# Patient Record
Sex: MALE | NOT HISPANIC OR LATINO | Employment: OTHER | URBAN - NONMETROPOLITAN AREA
[De-identification: names, ages, dates, MRNs, and addresses within clinical notes are randomized per-mention and may not be internally consistent; named-entity substitution may affect disease eponyms.]

---

## 2020-01-01 ENCOUNTER — LAB REQUISITION (OUTPATIENT)
Dept: LAB | Facility: HOSPITAL | Age: 70
End: 2020-01-01

## 2020-01-01 DIAGNOSIS — Z00.00 ROUTINE GENERAL MEDICAL EXAMINATION AT A HEALTH CARE FACILITY: ICD-10-CM

## 2020-01-01 LAB — D DIMER PPP FEU-MCNC: 1.9 MG/L (FEU) (ref 0–0.5)

## 2020-01-01 PROCEDURE — 85379 FIBRIN DEGRADATION QUANT: CPT

## 2020-07-02 ENCOUNTER — HOSPITAL ENCOUNTER (INPATIENT)
Age: 70
LOS: 5 days | Discharge: NURSING, REHAB OR LONG-TERM CARE | DRG: 056 | End: 2020-07-07
Attending: EMERGENCY MEDICINE | Admitting: HOSPITALIST
Payer: MEDICARE

## 2020-07-02 ENCOUNTER — APPOINTMENT (OUTPATIENT)
Dept: CT IMAGING | Age: 70
DRG: 056 | End: 2020-07-02
Payer: MEDICARE

## 2020-07-02 PROBLEM — G93.41 METABOLIC ENCEPHALOPATHY: Status: ACTIVE | Noted: 2020-07-02

## 2020-07-02 LAB
ALBUMIN SERPL-MCNC: 3.9 G/DL (ref 3.5–5.2)
ALP BLD-CCNC: 65 U/L (ref 40–130)
ALT SERPL-CCNC: 15 U/L (ref 5–41)
ANION GAP SERPL CALCULATED.3IONS-SCNC: 10 MMOL/L (ref 7–19)
AST SERPL-CCNC: 23 U/L (ref 5–40)
BASOPHILS ABSOLUTE: 0 K/UL (ref 0–0.2)
BASOPHILS RELATIVE PERCENT: 0.5 % (ref 0–1)
BILIRUB SERPL-MCNC: 0.8 MG/DL (ref 0.2–1.2)
BILIRUBIN URINE: NEGATIVE
BLOOD, URINE: NEGATIVE
BUN BLDV-MCNC: 16 MG/DL (ref 8–23)
CALCIUM SERPL-MCNC: 9.3 MG/DL (ref 8.8–10.2)
CHLORIDE BLD-SCNC: 104 MMOL/L (ref 98–111)
CLARITY: CLEAR
CO2: 26 MMOL/L (ref 22–29)
COLOR: YELLOW
CREAT SERPL-MCNC: 0.7 MG/DL (ref 0.5–1.2)
EOSINOPHILS ABSOLUTE: 0.2 K/UL (ref 0–0.6)
EOSINOPHILS RELATIVE PERCENT: 3 % (ref 0–5)
GFR NON-AFRICAN AMERICAN: >60
GLUCOSE BLD-MCNC: 96 MG/DL (ref 74–109)
GLUCOSE URINE: NEGATIVE MG/DL
HCT VFR BLD CALC: 38.8 % (ref 42–52)
HEMOGLOBIN: 12.7 G/DL (ref 14–18)
IMMATURE GRANULOCYTES #: 0 K/UL
KETONES, URINE: NEGATIVE MG/DL
LACTIC ACID: 0.6 MMOL/L (ref 0.5–1.9)
LEUKOCYTE ESTERASE, URINE: NEGATIVE
LYMPHOCYTES ABSOLUTE: 1.6 K/UL (ref 1.1–4.5)
LYMPHOCYTES RELATIVE PERCENT: 24.1 % (ref 20–40)
MCH RBC QN AUTO: 29.7 PG (ref 27–31)
MCHC RBC AUTO-ENTMCNC: 32.7 G/DL (ref 33–37)
MCV RBC AUTO: 90.7 FL (ref 80–94)
MONOCYTES ABSOLUTE: 0.5 K/UL (ref 0–0.9)
MONOCYTES RELATIVE PERCENT: 7.2 % (ref 0–10)
NEUTROPHILS ABSOLUTE: 4.3 K/UL (ref 1.5–7.5)
NEUTROPHILS RELATIVE PERCENT: 64.9 % (ref 50–65)
NITRITE, URINE: NEGATIVE
PDW BLD-RTO: 13 % (ref 11.5–14.5)
PH UA: 6.5 (ref 5–8)
PLATELET # BLD: 158 K/UL (ref 130–400)
PMV BLD AUTO: 9.8 FL (ref 9.4–12.4)
POTASSIUM SERPL-SCNC: 3.9 MMOL/L (ref 3.5–5)
PROTEIN UA: NEGATIVE MG/DL
RBC # BLD: 4.28 M/UL (ref 4.7–6.1)
SODIUM BLD-SCNC: 140 MMOL/L (ref 136–145)
SPECIFIC GRAVITY UA: 1.01 (ref 1–1.03)
TOTAL PROTEIN: 6.2 G/DL (ref 6.6–8.7)
UROBILINOGEN, URINE: 1 E.U./DL
WBC # BLD: 6.6 K/UL (ref 4.8–10.8)

## 2020-07-02 PROCEDURE — 36415 COLL VENOUS BLD VENIPUNCTURE: CPT

## 2020-07-02 PROCEDURE — 70450 CT HEAD/BRAIN W/O DYE: CPT

## 2020-07-02 PROCEDURE — 85025 COMPLETE CBC W/AUTO DIFF WBC: CPT

## 2020-07-02 PROCEDURE — 99285 EMERGENCY DEPT VISIT HI MDM: CPT

## 2020-07-02 PROCEDURE — 83605 ASSAY OF LACTIC ACID: CPT

## 2020-07-02 PROCEDURE — 2580000003 HC RX 258: Performed by: HOSPITALIST

## 2020-07-02 PROCEDURE — 6360000002 HC RX W HCPCS: Performed by: HOSPITALIST

## 2020-07-02 PROCEDURE — 80053 COMPREHEN METABOLIC PANEL: CPT

## 2020-07-02 PROCEDURE — 1210000000 HC MED SURG R&B

## 2020-07-02 PROCEDURE — 81003 URINALYSIS AUTO W/O SCOPE: CPT

## 2020-07-02 RX ORDER — LANOLIN ALCOHOL/MO/W.PET/CERES
3 CREAM (GRAM) TOPICAL DAILY PRN
Status: ON HOLD | COMMUNITY
End: 2020-07-06 | Stop reason: HOSPADM

## 2020-07-02 RX ORDER — QUETIAPINE FUMARATE 100 MG/1
100 TABLET, FILM COATED ORAL 3 TIMES DAILY
COMMUNITY

## 2020-07-02 RX ORDER — LAMOTRIGINE 100 MG/1
100 TABLET ORAL 2 TIMES DAILY
COMMUNITY

## 2020-07-02 RX ORDER — MEDROXYPROGESTERONE ACETATE 10 MG/1
10 TABLET ORAL 2 TIMES DAILY
COMMUNITY

## 2020-07-02 RX ORDER — SODIUM CHLORIDE 0.9 % (FLUSH) 0.9 %
10 SYRINGE (ML) INJECTION PRN
Status: DISCONTINUED | OUTPATIENT
Start: 2020-07-02 | End: 2020-07-07 | Stop reason: HOSPADM

## 2020-07-02 RX ORDER — CITALOPRAM 20 MG/1
20 TABLET ORAL NIGHTLY
COMMUNITY

## 2020-07-02 RX ORDER — PROMETHAZINE HYDROCHLORIDE 25 MG/1
12.5 TABLET ORAL EVERY 6 HOURS PRN
Status: DISCONTINUED | OUTPATIENT
Start: 2020-07-02 | End: 2020-07-07 | Stop reason: HOSPADM

## 2020-07-02 RX ORDER — ACETAMINOPHEN 650 MG/1
650 SUPPOSITORY RECTAL EVERY 6 HOURS PRN
Status: DISCONTINUED | OUTPATIENT
Start: 2020-07-02 | End: 2020-07-07 | Stop reason: HOSPADM

## 2020-07-02 RX ORDER — DONEPEZIL HYDROCHLORIDE 10 MG/1
10 TABLET, FILM COATED ORAL 2 TIMES DAILY
COMMUNITY

## 2020-07-02 RX ORDER — FLUCONAZOLE 150 MG/1
150 TABLET ORAL WEEKLY
COMMUNITY

## 2020-07-02 RX ORDER — ACETAMINOPHEN 325 MG/1
650 TABLET ORAL EVERY 6 HOURS PRN
Status: DISCONTINUED | OUTPATIENT
Start: 2020-07-02 | End: 2020-07-07 | Stop reason: HOSPADM

## 2020-07-02 RX ORDER — ONDANSETRON 2 MG/ML
4 INJECTION INTRAMUSCULAR; INTRAVENOUS EVERY 6 HOURS PRN
Status: DISCONTINUED | OUTPATIENT
Start: 2020-07-02 | End: 2020-07-07 | Stop reason: HOSPADM

## 2020-07-02 RX ORDER — PRAMIPEXOLE DIHYDROCHLORIDE 1 MG/1
1 TABLET ORAL 3 TIMES DAILY
COMMUNITY

## 2020-07-02 RX ORDER — ALPRAZOLAM 0.25 MG/1
0.25 TABLET ORAL 2 TIMES DAILY PRN
Status: ON HOLD | COMMUNITY
End: 2020-07-06 | Stop reason: SDUPTHER

## 2020-07-02 RX ORDER — GABAPENTIN 300 MG/1
300 CAPSULE ORAL 2 TIMES DAILY
COMMUNITY

## 2020-07-02 RX ORDER — SODIUM CHLORIDE 0.9 % (FLUSH) 0.9 %
10 SYRINGE (ML) INJECTION EVERY 12 HOURS SCHEDULED
Status: DISCONTINUED | OUTPATIENT
Start: 2020-07-02 | End: 2020-07-07 | Stop reason: HOSPADM

## 2020-07-02 RX ORDER — SODIUM CHLORIDE 9 MG/ML
INJECTION, SOLUTION INTRAVENOUS CONTINUOUS
Status: DISCONTINUED | OUTPATIENT
Start: 2020-07-02 | End: 2020-07-06

## 2020-07-02 RX ORDER — TRAMADOL HYDROCHLORIDE 50 MG/1
50 TABLET ORAL EVERY 6 HOURS PRN
Status: ON HOLD | COMMUNITY
End: 2020-07-06 | Stop reason: SDUPTHER

## 2020-07-02 RX ORDER — HYDROXYZINE HYDROCHLORIDE 25 MG/1
25 TABLET, FILM COATED ORAL NIGHTLY
COMMUNITY

## 2020-07-02 RX ADMIN — ENOXAPARIN SODIUM 40 MG: 40 INJECTION SUBCUTANEOUS at 23:34

## 2020-07-02 RX ADMIN — SODIUM CHLORIDE: 9 INJECTION, SOLUTION INTRAVENOUS at 23:43

## 2020-07-02 RX ADMIN — SODIUM CHLORIDE, PRESERVATIVE FREE 10 ML: 5 INJECTION INTRAVENOUS at 23:34

## 2020-07-02 NOTE — ED NOTES
Bed: 04  Expected date:   Expected time:   Means of arrival:   Comments:   [address], RN  07/02/20 8838
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
## 2020-07-03 PROBLEM — F31.9 BIPOLAR DISORDER (HCC): Status: ACTIVE | Noted: 2020-07-03

## 2020-07-03 PROBLEM — F02.818 ALZHEIMER'S DEMENTIA WITH BEHAVIORAL DISTURBANCE (HCC): Status: ACTIVE | Noted: 2020-07-03

## 2020-07-03 PROBLEM — G20 PARKINSON DISEASE (HCC): Status: ACTIVE | Noted: 2020-07-03

## 2020-07-03 PROBLEM — R26.2 IMPAIRED AMBULATION: Status: ACTIVE | Noted: 2020-07-03

## 2020-07-03 PROBLEM — G30.9 ALZHEIMER'S DEMENTIA WITH BEHAVIORAL DISTURBANCE (HCC): Status: ACTIVE | Noted: 2020-07-03

## 2020-07-03 LAB
ANION GAP SERPL CALCULATED.3IONS-SCNC: 9 MMOL/L (ref 7–19)
BUN BLDV-MCNC: 14 MG/DL (ref 8–23)
CALCIUM SERPL-MCNC: 9.1 MG/DL (ref 8.8–10.2)
CHLORIDE BLD-SCNC: 105 MMOL/L (ref 98–111)
CO2: 26 MMOL/L (ref 22–29)
CREAT SERPL-MCNC: 0.7 MG/DL (ref 0.5–1.2)
GFR NON-AFRICAN AMERICAN: >60
GLUCOSE BLD-MCNC: 90 MG/DL (ref 74–109)
HCT VFR BLD CALC: 39.4 % (ref 42–52)
HEMOGLOBIN: 12.9 G/DL (ref 14–18)
MCH RBC QN AUTO: 29.9 PG (ref 27–31)
MCHC RBC AUTO-ENTMCNC: 32.7 G/DL (ref 33–37)
MCV RBC AUTO: 91.4 FL (ref 80–94)
PDW BLD-RTO: 12.8 % (ref 11.5–14.5)
PLATELET # BLD: 159 K/UL (ref 130–400)
PMV BLD AUTO: 10.2 FL (ref 9.4–12.4)
POTASSIUM SERPL-SCNC: 4.3 MMOL/L (ref 3.5–5)
RBC # BLD: 4.31 M/UL (ref 4.7–6.1)
SODIUM BLD-SCNC: 140 MMOL/L (ref 136–145)
WBC # BLD: 5.8 K/UL (ref 4.8–10.8)

## 2020-07-03 PROCEDURE — 80048 BASIC METABOLIC PNL TOTAL CA: CPT

## 2020-07-03 PROCEDURE — 87040 BLOOD CULTURE FOR BACTERIA: CPT

## 2020-07-03 PROCEDURE — 2580000003 HC RX 258: Performed by: HOSPITALIST

## 2020-07-03 PROCEDURE — 6370000000 HC RX 637 (ALT 250 FOR IP): Performed by: PSYCHIATRY & NEUROLOGY

## 2020-07-03 PROCEDURE — 6370000000 HC RX 637 (ALT 250 FOR IP): Performed by: HOSPITALIST

## 2020-07-03 PROCEDURE — 1210000000 HC MED SURG R&B

## 2020-07-03 PROCEDURE — 99223 1ST HOSP IP/OBS HIGH 75: CPT | Performed by: PSYCHIATRY & NEUROLOGY

## 2020-07-03 PROCEDURE — 85027 COMPLETE CBC AUTOMATED: CPT

## 2020-07-03 PROCEDURE — 6360000002 HC RX W HCPCS: Performed by: HOSPITALIST

## 2020-07-03 PROCEDURE — 92523 SPEECH SOUND LANG COMPREHEN: CPT

## 2020-07-03 PROCEDURE — 92610 EVALUATE SWALLOWING FUNCTION: CPT

## 2020-07-03 PROCEDURE — 87086 URINE CULTURE/COLONY COUNT: CPT

## 2020-07-03 PROCEDURE — 36415 COLL VENOUS BLD VENIPUNCTURE: CPT

## 2020-07-03 RX ORDER — QUETIAPINE FUMARATE 100 MG/1
100 TABLET, FILM COATED ORAL 3 TIMES DAILY
Status: DISCONTINUED | OUTPATIENT
Start: 2020-07-03 | End: 2020-07-07 | Stop reason: HOSPADM

## 2020-07-03 RX ORDER — PRAMIPEXOLE DIHYDROCHLORIDE 1 MG/1
1 TABLET ORAL 3 TIMES DAILY
Status: DISCONTINUED | OUTPATIENT
Start: 2020-07-03 | End: 2020-07-07 | Stop reason: HOSPADM

## 2020-07-03 RX ORDER — TRAZODONE HYDROCHLORIDE 150 MG/1
150 TABLET ORAL NIGHTLY
Status: DISCONTINUED | OUTPATIENT
Start: 2020-07-03 | End: 2020-07-07 | Stop reason: HOSPADM

## 2020-07-03 RX ORDER — GABAPENTIN 300 MG/1
300 CAPSULE ORAL 2 TIMES DAILY
Status: DISCONTINUED | OUTPATIENT
Start: 2020-07-03 | End: 2020-07-07 | Stop reason: HOSPADM

## 2020-07-03 RX ORDER — ACETAMINOPHEN 500 MG
1000 TABLET ORAL EVERY 8 HOURS SCHEDULED
Status: DISCONTINUED | OUTPATIENT
Start: 2020-07-03 | End: 2020-07-07 | Stop reason: HOSPADM

## 2020-07-03 RX ORDER — CITALOPRAM 20 MG/1
20 TABLET ORAL NIGHTLY
Status: DISCONTINUED | OUTPATIENT
Start: 2020-07-03 | End: 2020-07-07 | Stop reason: HOSPADM

## 2020-07-03 RX ORDER — DONEPEZIL HYDROCHLORIDE 5 MG/1
10 TABLET, FILM COATED ORAL 2 TIMES DAILY
Status: DISCONTINUED | OUTPATIENT
Start: 2020-07-03 | End: 2020-07-07 | Stop reason: HOSPADM

## 2020-07-03 RX ORDER — LANOLIN ALCOHOL/MO/W.PET/CERES
9 CREAM (GRAM) TOPICAL NIGHTLY
Status: DISCONTINUED | OUTPATIENT
Start: 2020-07-03 | End: 2020-07-07 | Stop reason: HOSPADM

## 2020-07-03 RX ORDER — LAMOTRIGINE 100 MG/1
100 TABLET ORAL 2 TIMES DAILY
Status: DISCONTINUED | OUTPATIENT
Start: 2020-07-03 | End: 2020-07-04

## 2020-07-03 RX ADMIN — SODIUM CHLORIDE, PRESERVATIVE FREE 10 ML: 5 INJECTION INTRAVENOUS at 08:24

## 2020-07-03 RX ADMIN — ENOXAPARIN SODIUM 40 MG: 40 INJECTION SUBCUTANEOUS at 21:40

## 2020-07-03 RX ADMIN — PRAMIPEXOLE DIHYDROCHLORIDE 1 MG: 1 TABLET ORAL at 21:31

## 2020-07-03 RX ADMIN — ACETAMINOPHEN 1000 MG: 325 TABLET, FILM COATED ORAL at 13:56

## 2020-07-03 RX ADMIN — GABAPENTIN 300 MG: 300 CAPSULE ORAL at 02:01

## 2020-07-03 RX ADMIN — TRAZODONE HYDROCHLORIDE 150 MG: 150 TABLET ORAL at 21:31

## 2020-07-03 RX ADMIN — PRAMIPEXOLE DIHYDROCHLORIDE 1 MG: 1 TABLET ORAL at 08:23

## 2020-07-03 RX ADMIN — GABAPENTIN 300 MG: 300 CAPSULE ORAL at 08:24

## 2020-07-03 RX ADMIN — DONEPEZIL HYDROCHLORIDE 10 MG: 5 TABLET, FILM COATED ORAL at 08:24

## 2020-07-03 RX ADMIN — GABAPENTIN 300 MG: 300 CAPSULE ORAL at 21:31

## 2020-07-03 RX ADMIN — PRAMIPEXOLE DIHYDROCHLORIDE 1 MG: 1 TABLET ORAL at 13:56

## 2020-07-03 RX ADMIN — QUETIAPINE FUMARATE 100 MG: 100 TABLET ORAL at 21:32

## 2020-07-03 RX ADMIN — QUETIAPINE FUMARATE 100 MG: 100 TABLET ORAL at 13:57

## 2020-07-03 RX ADMIN — LAMOTRIGINE 100 MG: 100 TABLET ORAL at 21:32

## 2020-07-03 RX ADMIN — ACETAMINOPHEN 1000 MG: 325 TABLET, FILM COATED ORAL at 21:41

## 2020-07-03 RX ADMIN — LAMOTRIGINE 100 MG: 100 TABLET ORAL at 08:24

## 2020-07-03 RX ADMIN — CITALOPRAM HYDROBROMIDE 20 MG: 20 TABLET ORAL at 21:32

## 2020-07-03 RX ADMIN — DONEPEZIL HYDROCHLORIDE 10 MG: 5 TABLET, FILM COATED ORAL at 21:41

## 2020-07-03 RX ADMIN — PROMETHAZINE HYDROCHLORIDE 12.5 MG: 25 TABLET ORAL at 08:23

## 2020-07-03 RX ADMIN — LAMOTRIGINE 100 MG: 100 TABLET ORAL at 02:01

## 2020-07-03 RX ADMIN — DONEPEZIL HYDROCHLORIDE 10 MG: 5 TABLET, FILM COATED ORAL at 02:01

## 2020-07-03 RX ADMIN — MELATONIN 9 MG: at 21:41

## 2020-07-03 RX ADMIN — QUETIAPINE FUMARATE 100 MG: 100 TABLET ORAL at 08:24

## 2020-07-03 ASSESSMENT — PAIN SCALES - GENERAL
PAINLEVEL_OUTOF10: 6
PAINLEVEL_OUTOF10: 0
PAINLEVEL_OUTOF10: 3
PAINLEVEL_OUTOF10: 4

## 2020-07-03 NOTE — PROGRESS NOTES
Speech Language Pathology  Facility/Department: [facility] 3 APARNA/VAS/MED  Initial Speech/Language/Cognitive Assessment  Bedside Swallow Evaluation   NAME: Ramirez Hastings  : 1950   MRN: [id number]  ADMISSION DATE: 2020  ADMITTING DIAGNOSIS: has Metabolic encephalopathy on their problem list.    Date of Eval: 7/3/2020   Evaluating Therapist: JAMSHID Babcock    Assessment:  Completed assessment. SLP ranked functional intelligibility of speech for unfamiliar listeners at 100% in utterances. Patient exhibited decreased select and sustained attention, slow processing, decreased auditory comprehension of even simple questions and commands, impaired verbal expressions with utterances frequently not pertaining to topics and questions at hand, decreased orientation, impaired immediate/short-term memory, and decreased reasoning/problem solving. Unaware of baseline cognitive-linguistic functioning.     Also evaluated patient's swallowing function. Patient exhibits decreased oral prep of more solid consistencies as well as sluggish, inconsistently mildly decreased laryngeal elevation for swallow airway protection. Even so, no outward S/S penetration/aspiration was noted with any regular solid consistency trial or thin H2O trial presented during assessment this date.     At this time, would trial bite sized consistency. Continue thin liquids. Recommend meds in pudding/applesauce. Will continue to follow.  Thank you for this consult.     Treatment/Goals  Timeframe for Short-term Goals: 1x/day for 3 days   Goal 1: Patient will tolerate bite sized consistency and thin liquids with min S/S penetration/aspiration during PO intake.   Goal 2: Patient staff will follow swallow safety recommendations to decrease risk of penetration/aspiration during PO intake.   Goal 3: Patient will receive daily oral care, via staff, to decrease bacteria from the oral cavity.   Goal 4: Re-assessment of swallow function for potential diet upgrade. Goal 5: Monitor speech and cognitive-linguistic functioning.     Subjective:  Chart Reviewed: Yes  Patient assessed for rehabilitation services?: Yes  Family / Caregiver Present: No     Objective: Auditory Comprehension  Comprehension:  (Delays were noted and break down was observed during even simple yes/no questions regarding immediate environment and current state of being. While delays were noted, patient demonstrated ability to follow simple 1 step commands at independent level. Delays were observed and break down was noted during simple 2 step commands.)     Expression  Primary Mode of Expression:  (Confrontation naming of items, structured responsive speech, and responses in natural conversation were all considered to be delayed and impaired with responses frequently not pertaining to topics and questions at hand.)     Motor Speech:  (SLP ranked functional intelligibility of speech for unfamiliar listeners at 100% in conversation.)     Overall Orientation Status:  (Patient demonstrated ability to verbalize name and birthday at independent level. Patient did not verbalize age, address, phone number, city, state, hospital, month, JUNO, date, or year independently.)     Attention:  (Patient demonstrated decreased select and sustained attention during assessment.)     Memory:  (Patient demonstrated decreased immediate memory with sequences of unrelated numbers/words set up to 3 items without repetitions provided. Patient demonstrated decreased short-term/working memory with less than 2 minute delay+distractions present during assessment.)     Problem Solving:  (Patient did not verbalize current PCP or pharmacy at independent level. Patient did not verbalize conditions in which he takes medications nor verbalize names of medications he currently takes independently. Patient did not calculate time appropriately to follow example medication schedules at independent level.  Patient did not verbalize appropriate simple solutions to situations that could occur during activities of daily living independently.)     Additional Assessments  Assessed patient's swallowing function. Patient exhibited decreased rotary jaw movement during oral prep of regular solid consistency trials presented by SLP. Oral transit of regular solid consistency primarily measured 1-2 seconds in length and min oral cavity residue was noted post swallows; residue cleared from the mouth with additional dry swallows. Oral transit of thin H2O trials, presented independently via cup, primarily measured 1-2 seconds in length. Laryngeal elevation during swallow initiation was considered to be sluggish and inconsistently mildly decreased for swallow airway protection. Even so, no outward S/S penetration/aspiration was observed with any regular solid consistency trial or thin H2O trial presented during evaluation this date.     At this time, would trial bite sized consistency. Continue thin liquids. Recommend meds in pudding/applesauce. Will continue to follow.      Electronically signed by JAMSHID Gramajo on 7/3/2020 at 3:00 PM

## 2020-07-03 NOTE — PROGRESS NOTES
[place], [place], [address]        DEPARTMENT OF HOSPITALIST MEDICINE        PROGRESS  NOTE:    NOTE: Portions of this note have been copied forward, however, changed to reflect the most current clinical status of this patient. Patient:  Samantha Blakely  YOB: 1950  Date of Service: 7/3/2020  MRN: [id number]   Acct: [de-identified]   Primary Care Physician: Lilian Britt  Advance Directive: Full Code  Admit Date: 7/2/2020       Hospital Day: 1      CHIEF COMPLAINT:  Chief Complaint   Patient presents with    Altered Mental Status     arrived via EMS from [facility] with reports of change in mental status noted this am       SUBJECTIVE / [address]:    7/3/2020  I saw and evaluated patient at the bedside today. He was having tremors of his hands likely secondary to Parkinson's disease. Neurology evaluated the patient and ruled out normal pressure hydrocephalus. They are giving him combination of medications to help him sleep tonight. I am going to order psychiatric evaluation as well PT/OT and case management consult for DC planning. 7/2/2020  HISTORY OF PRESENT ILLNESS:   This 71 y.o. male who came in through the ED on 7/2 on transfer from a regional nursing home because of agitation and confusion. Since being here he is mildly confused but primarily agitated. Has a history of Parkinson's disease with secondary dementia and prominent resting tremor. He is typically followed at the [facility] clinic in [place]. Currently takes a combination of Seroquel, gabapentin, Aricept and Mirapex. Apparently has been on Nuedexta , namenda and neuplazid in the past, according to the records. Admission blood work and urinalysis unremarkable. CT scan of the head films are reviewed. There is deep and cortical atrophy although the radiologist read it as ventricular enlargement somewhat out of proportion to the degree of atrophy.   Patient's mental status changes PRN    Or  ondansetron, 4 mg, Q6H PRN        Infusions:   sodium chloride 100 mL/hr at 07/02/20 2343       Laboratory Data:  Recent Labs     07/02/20  1800 07/03/20  0139   WBC 6.6 5.8   HGB 12.7* 12.9*    159     Recent Labs     07/02/20  1800 07/03/20  0139    140   K 3.9 4.3    105   CO2 26 26   BUN 16 14   CREATININE 0.7 0.7   GLUCOSE 96 90     Recent Labs     07/02/20  1800   AST 23   ALT 15   BILITOT 0.8   ALKPHOS 65     Troponin T: No results for input(s): TROPONINI in the last 72 hours. Pro-BNP: No results for input(s): BNP in the last 72 hours. INR: No results for input(s): INR in the last 72 hours. UA:  Recent Labs     07/02/20  1700   COLORU YELLOW   PHUR 6.5   CLARITYU Clear   SPECGRAV 1.014   LEUKOCYTESUR Negative   UROBILINOGEN 1.0   BILIRUBINUR Negative   BLOODU Negative   GLUCOSEU Negative     A1C: No results for input(s): LABA1C in the last 72 hours. ABG:No results for input(s): PHART, OGV4UTO, PO2ART, AOS0EDR, BEART, HGBAE, K3EDNPLV, CARBOXHGBART in the last 72 hours. Imaging:    Ct Head Wo Contrast    Result Date: 7/2/2020  1. Ventriculomegaly out of proportion to the degree of atrophy raising the differential of normal pressure hydrocephalus. The brain is otherwise unremarkable except for a empty sella. 2. Mucous retention cyst or polyp within the right maxillary sinus. Signed by Dr Jaycee La on 7/2/2020 7:29 PM       ASSESSMENT & PLAN:    Active Problems:    Metabolic encephalopathy    Parkinson disease ([address]    Alzheimer's dementia with behavioral disturbance ([address]    Impaired ambulation    Bipolar disorder ([address]  Resolved Problems:    * No resolved hospital problems.  *    Continue with current medications  Continue with one-to-one sitter  Neurology consult appreciated  Patient will be given a combination of melatonin and trazodone tonight at 6 PM to help him catch up on his sleep   Psychiatry consult given for evaluation and further treatment recommendations  PT/OT evaluation ordered  Case management consult given for DC planning  Continue with IV hydration  Patient started on soft diet      Repeat labs in a.m. Electrolyte replacement as per protocol. Patient will be monitored very closely on the floor. Further recommendations as per the hospital course. Discharge Plan: Next couple of days once patient is clinically and symptomatically stable and cleared by specialties on the case      Patient  is on DVT prophylaxis  Current medications reviewed  Lab work reviewed  Radiology/Chest x-ray films reviewed  Treatment recommendations from suspecialities reviewed, appreciated and agreed with  Discussed with the nurse and addressed all questions/concerns      Florentino Pineda MD  7/3/2020 3:28 PM      DISCLAIMER: This note was created with electronic voice recognition which does have occasional errors. If you have any questions regarding the content within the note please do not hesitate to contact me. .. Thanks.

## 2020-07-03 NOTE — CONSULTS
[facility] Neurology Consult  ? ? Patient: Ricardo Soto  MR#: [id number]  Account Number: [de-identified]   Room: 0311/311-01   YOB: 1950  Date of Progress Note: 7/3/2020  Time of Note 11:17 AM  Attending Physician: Carlotta Rolle MD  Consulting Physician: Pedro Taylor M.D.  ?  ? CHIEF COMPLAINT: Agitation, confusion, abnormal CT of the head  ? HISTORY OF PRESENT ILLNESS:   This 71 y.o. male who came in through the ED on 7/2 on transfer from a regional nursing home because of agitation and confusion. Since being here he is mildly confused but primarily agitated. Has a history of Parkinson's disease with secondary dementia and prominent resting tremor. He is typically followed at the [facility] clinic in [place]. Currently takes a combination of Seroquel, gabapentin, Aricept and Mirapex. Apparently has been on Nuedexta , namenda and neuplazid in the past, according to the records. Admission blood work and urinalysis unremarkable. CT scan of the head films are reviewed. There is deep and cortical atrophy although the radiologist read it as ventricular enlargement somewhat out of proportion to the degree of atrophy. Patient's mental status changes were relatively sudden. Also, noteworthy is the fact that he had no sleep last night and or the night before as best we can tell. REVIEW OF SYSTEMS:  Constitutional - No fever or chills. No diaphoresis or significant fatigue. HENT - No tinnitus or significant hearing loss. Eyes - no sudden vision change or eye pain  Respiratory - no significant shortness of breath or cough  Cardiovascular - no chest pain No palpitations or significant leg swelling  Gastrointestinal - no abdominal swelling or pain. Genitourinary - No difficulty urinating, dysuria  Musculoskeletal - no back pain or myalgia. Skin - no color change or rash  Neurologic - No seizures. No lateralizing weakness. Hematologic - no easy bruising or excessive bleeding.   Psychiatric - no severe anxiety or nervousness. All other review of systems are negative. ? Past Medical History:      Diagnosis Date    Anxiety     [facility]     [facility]     Parkinson disease ([facility]      Past Surgical History:  History reviewed. No pertinent surgical history. Medications in Hospital:     Current Facility-Administered Medications:     citalopram (CELEXA) tablet 20 mg, 20 mg, Oral, Nightly, Shobha Hernandez MD    donepezil (ARICEPT) tablet 10 mg, 10 mg, Oral, BID, Shobha Hernandez MD, 10 mg at 07/03/20 1344    gabapentin (NEURONTIN) capsule 300 mg, 300 mg, Oral, BID, Shobha Hernandez MD, 300 mg at 07/03/20 7513    lamoTRIgine (LAMICTAL) tablet 100 mg, 100 mg, Oral, BID, Shobha Hernandez MD, 100 mg at 07/03/20 1092    QUEtiapine (SEROQUEL) tablet 100 mg, 100 mg, Oral, TID, Shobha Hernandez MD, 100 mg at 07/03/20 3691    pramipexole (MIRAPEX) tablet 1 mg, 1 mg, Oral, TID, Shobha Hernandez MD, 1 mg at 07/03/20 2210    sodium chloride flush 0.9 % injection 10 mL, 10 mL, Intravenous, 2 times per day, Shobha Hernandez MD, 10 mL at 07/03/20 7391    sodium chloride flush 0.9 % injection 10 mL, 10 mL, Intravenous, PRN, Shobha Hernandez MD    acetaminophen (TYLENOL) tablet 650 mg, 650 mg, Oral, Q6H PRN **OR** acetaminophen (TYLENOL) suppository 650 mg, 650 mg, Rectal, Q6H PRN, Shobha Hernandez MD    magnesium hydroxide (MILK OF MAGNESIA) 400 MG/5ML suspension 30 mL, 30 mL, Oral, Daily PRN, Shobha Hernandez MD    promethazine (PHENERGAN) tablet 12.5 mg, 12.5 mg, Oral, Q6H PRN, 12.5 mg at 07/03/20 0823 **OR** ondansetron (ZOFRAN) injection 4 mg, 4 mg, Intravenous, Q6H PRN, Shobha Hernandez MD    enoxaparin (LOVENOX) injection 40 mg, 40 mg, Subcutaneous, Q24H, Shobha Hernandez MD, 40 mg at 07/02/20 2334    0.9 % sodium chloride infusion, , Intravenous, Continuous, Shobha Hernandez MD, Last Rate: 100 mL/hr at 07/02/20 2346  Allergies: Patient has no known allergies.   Social History:   TOBACCO:  has an unknown smoking status. He has never used smokeless tobacco.  ETOH:  reports previous alcohol use. Family History:       Family history unknown: Yes     ?  ? Physical Exam:    Vitals: /60   Pulse 70   Temp 96.9 °F (36.1 °C) (Temporal)   Resp 20   SpO2 98%   Constitutional - well developed, well nourished. Eyes - conjunctiva normal. Pupils react to light  Ear, nose, throat -hearing intact to finger rub No scars, masses, or lesions over external nose or ears, no atrophy of tongue  Neck-symmetric, no masses noted, no jugular vein distension  Respiration- chest wall appears symmetric, good expansion,   normal effort without use of accessory muscles  Musculoskeletal - no significant wasting of muscles noted, no bony deformities  Extremities-no clubbing, cyanosis or edema  Skin - warm, dry, and intact. No rash, erythema, or pallor. Psychiatric - mood, affect, and behavior appear normal.   Neurological exam  Awake, alert, with rapid speech. Disoriented to place but knew that it was summertime. Has pressured speech. Cranial Nerve Exam   CN II- Visual fields grossly unremarkable  CN III, IV,VI-EOMI, No nystagmus, conjugate eye movements, no ptosis  CN V-sensation intact to LT over face  CN VII-no facial assymetry  CN VIII-Hearing intact to voice  CN IX and X- Palate elevates in midline  CN XI-good shoulder shrug  CN XII-Tongue midline with no fasciculations or fibrillations  Motor Exam  V/V throughout upper and lower extremities bilaterally, no cogwheeling, normal tone  Reflexes   Absent throughout    Tremors-prominent resting tremor noted in the upper extremities  Gait  Not tested  Coordination  Finger to nose-unremarkable  ?   ?  CBC:   Recent Labs     07/02/20  1800 07/03/20  0139   WBC 6.6 5.8   HGB 12.7* 12.9*    159     BMP:   Recent Labs     07/02/20  1800 07/03/20  0139    140   K 3.9 4.3    105   CO2 26 26   BUN 16 14   CREATININE 0.7 0.7   GLUCOSE 96 90     Hepatic:   Recent Labs 07/02/20  1800   AST 23   ALT 15   BILITOT 0.8   ALKPHOS 65     Lipids: No results for input(s): CHOL, HDL in the last 72 hours. Invalid input(s): LDLCALCU  INR: No results for input(s): INR in the last 72 hours. ?  ?  Assessment and Plan   1. Parkinson's disease with dementia. Currently having some agitation and confusion. No obvious source for encephalopathy. We will try a combination of trazodone and melatonin at 6 PM tonight to get him some sleep and see where we stand. No obvious focal signs or symptoms to suggest stroke or seizure. No obvious infection. Continue baseline medications  ? 2. Abnormal CT of the head. NPH is not felt to be very likely at all. ?3.  History of bipolar disorder.   Continue Lamictal      Karl Cho MD  Board Certified in Neurology

## 2020-07-03 NOTE — PROGRESS NOTES
Nurse Nicolás Solano from [facility] called asking about the pt's admitting diagnosis. I stated that the pt's nurse was unable to come to the phone but that I would let her know that they called and ask that she give them a call back at [phone].

## 2020-07-03 NOTE — H&P
[facility] - History & Physical      PCP: Lilian Britt    Date of Admission: 7/2/2020    Date of Service: 7/2/2020    Chief Complaint: confused    History Of Present Illness:   71 y.o. male who presents to the emergency department for evaluation of confusion. Patient recently was transferred from [address] [place] [address] to [address] [place] [address] within the last 1 month. Per NH records staff stated that this morning he was very confused and combative. Pt is normally alert and  conversational and acutely nurses noted acute changes. On exam pt is thinks he is in senior care and is combative. No family is available to provide additional history. Patient underwent a CT of the head which is concerning for possible normal pressure hydrocephalus otherwise no signs of acute infection, vitals afebrile and labs are otherwise unremarkable. Past Medical History:        Diagnosis Date    Anxiety     B[facility] disorder ([facility]     Dementia ([facility]     Parkinson disease ([facility]        Past Surgical History:    History reviewed. No pertinent surgical history. Home Medications:  Prior to Admission medications    Medication Sig Start Date End Date Taking? Authorizing Provider   citalopram (CELEXA) 20 MG tablet Take 20 mg by mouth nightly   Yes Historical Provider, MD   fluconazole (DIFLUCAN) 150 MG tablet Take 150 mg by mouth once a week   Yes Historical Provider, MD   hydrOXYzine (ATARAX) 25 MG tablet Take 25 mg by mouth nightly   Yes Historical Provider, MD   Deutetrabenazine 12 MG TABS Take 12 mg by mouth 2 times daily   Yes Historical Provider, MD   donepezil (ARICEPT) 10 MG tablet Take 10 mg by mouth 2 times daily   Yes Historical Provider, MD   gabapentin (NEURONTIN) 300 MG capsule Take 300 mg by mouth 2 times daily.    Yes Historical Provider, MD   lamoTRIgine (LAMICTAL) 100 MG tablet Take 100 mg by mouth 2 times daily   Yes Historical Provider, MD   medroxyPROGESTERone (PROVERA) 10 MG tablet Take 10 Capillary refill takes less than 2 seconds. Coloration: Skin is not pale. Findings: No rash. Neurological:      Mental Status: He is alert. He is disoriented and confused. CN2-12 grossly intact. Tremors noted in Rt upper Ext    Diagnostic Data:  Imaging:   CT Head WO Contrast   Final Result   1. Ventriculomegaly out of proportion to the degree of atrophy raising   the differential of normal pressure hydrocephalus. The brain is   otherwise unremarkable except for a empty sella. 2. Mucous retention cyst or polyp within the right maxillary sinus. Signed by Dr Phan Jimenez on 7/2/2020 7:29 PM        CBC:  Recent Labs     07/02/20  1800   WBC 6.6   HGB 12.7*   HCT 38.8*        BMP:  Recent Labs     07/02/20  1800      K 3.9      CO2 26   BUN 16   CREATININE 0.7   CALCIUM 9.3     Recent Labs     07/02/20  1800   AST 23   ALT 15   BILITOT 0.8   ALKPHOS 65     Coag Panel: No results for input(s): INR, PROTIME, APTT in the last 72 hours. Cardiac Enzymes: No results for input(s): Amaryllis Goodell in the last 72 hours. ABGs:No results found for: Aide Persons, [id number]  Urinalysis:  Lab Results   Component Value Date    NITRU Negative 07/02/2020    BLOODU Negative 07/02/2020    SPECGRAV 1.014 07/02/2020    GLUCOSEU Negative 07/02/2020       Active Hospital Problems    Diagnosis Date Noted    Metabolic encephalopathy [E71.76] 07/02/2020       Assessment and Plan: Active Problems:    Metabolic encephalopathy- unclear etiology    Possible NPH    Hx Parkinsons Dz    Parkinsonz Dementia       Plan: Will admit pt to [address]  Will consult neurology for further evaluation of normal pressure hydrocephalus  We will consult PT OT  Will order Speech Swallow consult  We will order blood cultures and urine cultures, will order ammonia levels.   Full CODE      DVT prophylaxis with Lovenox         Toy Nafisay  Hospitalist service  7/2/2020  9:01 PM

## 2020-07-03 NOTE — ED PROVIDER NOTES
[facility]  eMERGENCY dEPARTMENT eNCOUnter      Pt Name: Apryl Tay  MRN: [id number]  [place] 1950  Date of evaluation: 7/2/2020  Provider: Gretchen Ellison MD    CHIEF COMPLAINT       Chief Complaint   Patient presents with    Altered Mental Status     arrived via EMS from [facility] with reports of change in mental status noted this am         HISTORY OF PRESENT ILLNESS   (Location/Symptom, Timing/Onset,Context/Setting, Quality, Duration, Modifying Factors, Severity)  Note limiting factors. Apryl Tay is a 71 y.o. male who presents to the emergency department for evaluation regarding altered mental status. Patient just recently transferred from [address] [place] [address] to [address] [place] [address]. Staff stated that this morning he was very confused and not his usual self. Nursing staff have discussed with the nurse practitioner who has seen patient for some years at both [facility] homes and reports that he is normally alert, conversational and that today is not his baseline. On arrival to the emergency department patient is alert, he cannot tell me the year or where he is at. He is really difficult to obtain much history at all from due to his confusion. No family is available to provide additional history. In review of previous records he apparently has a prior history of bipolar disorder. HPI    NursingNotes were reviewed. REVIEW OF SYSTEMS    (2-9 systems for level 4, 10 or more for level 5)     Review of Systems   Unable to perform ROS: Mental status change            PAST MEDICALHISTORY     Past Medical History:   Diagnosis Date    Anxiety     Bipolar 1 disorder ([facility]     Dementia ([facility]     Parkinson disease ([facility]          SURGICAL HISTORY     History reviewed. No pertinent surgical history. CURRENT MEDICATIONS     Previous Medications    ALPRAZOLAM (XANAX) 0.25 MG TABLET    Take 0.25 mg by mouth 2 times daily as needed for Sleep.     CITALOPRAM (CELEXA) 20 MG TABLET    Take 20 mg by mouth nightly    DEUTETRABENAZINE 12 MG TABS    Take 12 mg by mouth 2 times daily    DONEPEZIL (ARICEPT) 10 MG TABLET    Take 10 mg by mouth 2 times daily    FLUCONAZOLE (DIFLUCAN) 150 MG TABLET    Take 150 mg by mouth once a week    GABAPENTIN (NEURONTIN) 300 MG CAPSULE    Take 300 mg by mouth 2 times daily. HYDROXYZINE (ATARAX) 25 MG TABLET    Take 25 mg by mouth nightly    LAMOTRIGINE (LAMICTAL) 100 MG TABLET    Take 100 mg by mouth 2 times daily    MEDROXYPROGESTERONE (PROVERA) 10 MG TABLET    Take 10 mg by mouth 2 times daily    MELATONIN 3 MG TABS TABLET    Take 3 mg by mouth daily as needed    PRAMIPEXOLE (MIRAPEX) 1 MG TABLET    Take 1 mg by mouth 3 times daily    QUETIAPINE (SEROQUEL) 100 MG TABLET    Take 100 mg by mouth 3 times daily    TRAMADOL (ULTRAM) 50 MG TABLET    Take 50 mg by mouth every 8 hours as needed for Pain. ALLERGIES     Patient has no known allergies. FAMILY HISTORY     History reviewed. No pertinent family history.        SOCIAL HISTORY       Social History     Socioeconomic History    Marital status: Single     Spouse name: None    Number of children: None    Years of education: None    Highest education level: None   Occupational History    None   Social Needs    Financial resource strain: None    Food insecurity     Worry: None     Inability: None    Transportation needs     Medical: None     Non-medical: None   Tobacco Use    Smoking status: Unknown If Ever Smoked    Smokeless tobacco: Never Used   Substance and Sexual Activity    Alcohol use: Not Currently    Drug use: Not Currently    Sexual activity: None   Lifestyle    Physical activity     Days per week: None     Minutes per session: None    Stress: None   Relationships    Social connections     Talks on phone: None     Gets together: None     Attends Yarsani service: None     Active member of club or organization: None     Attends meetings of clubs or organizations: None Relationship status: None    Intimate partner violence     Fear of current or ex partner: None     Emotionally abused: None     Physically abused: None     Forced sexual activity: None   Other Topics Concern    None   Social History Narrative    None       SCREENINGS    Raffi Coma Scale  Eye Opening: Spontaneous  Best Verbal Response: Confused  Best Motor Response: Obeys commands  Raffi Coma Scale Score: 14        PHYSICAL EXAM    (up to 7 for level 4, 8 or more for level 5)     ED Triage Vitals [07/02/20 1711]   BP Temp Temp src Pulse Resp SpO2 Height Weight   (!) 94/54 99.1 °F (37.3 °C) -- 75 18 96 % -- --       Physical Exam  Vitals signs and nursing note reviewed. HENT:      Head: Atraumatic. Mouth/Throat:      Mouth: Mucous membranes are moist. Mucous membranes are not dry. Pharynx: No posterior oropharyngeal erythema. Eyes:      General: No scleral icterus. Pupils: Pupils are equal, round, and reactive to light. Neck:      Trachea: No tracheal deviation. Cardiovascular:      Rate and Rhythm: Normal rate and regular rhythm. Pulses: Normal pulses. Heart sounds: Normal heart sounds. No murmur. Pulmonary:      Effort: Pulmonary effort is normal. No respiratory distress. Breath sounds: Normal breath sounds. No stridor. Abdominal:      General: There is no distension. Palpations: Abdomen is soft. Tenderness: There is no abdominal tenderness. There is no guarding. Musculoskeletal:      Right lower leg: No edema. Left lower leg: No edema. Skin:     Capillary Refill: Capillary refill takes less than 2 seconds. Coloration: Skin is not pale. Findings: No rash. Neurological:      Mental Status: He is alert. He is disoriented and confused. Comments: Seems to move upper and lower extremities equally. He does have a baseline resting tremor noted in his right upper extremity.          DIAGNOSTIC RESULTS       RADIOLOGY:  Non-plain film images such as CT, Ultrasound and MRI are read by the radiologist. Plain radiographic images are visualized and preliminarily interpreted bythe emergency physician with the below findings:      CT Head WO Contrast   Final Result   1. Ventriculomegaly out of proportion to the degree of atrophy raising   the differential of normal pressure hydrocephalus. The brain is   otherwise unremarkable except for a empty sella. 2. Mucous retention cyst or polyp within the right maxillary sinus. Signed by Dr Jaclyn Jasso on 7/2/2020 7:29 PM              LABS:  Labs Reviewed   COMPREHENSIVE METABOLIC PANEL - Abnormal; Notable for the following components:       Result Value    Total Protein 6.2 (*)     All other components within normal limits   CBC WITH AUTO DIFFERENTIAL - Abnormal; Notable for the following components:    RBC 4.28 (*)     Hemoglobin 12.7 (*)     Hematocrit 38.8 (*)     MCHC 32.7 (*)     All other components within normal limits   URINALYSIS   LACTIC ACID, PLASMA       All other labs were within normal range or not returned as of this dictation. EMERGENCY DEPARTMENT COURSE and DIFFERENTIAL DIAGNOSIS/MDM:   Vitals:    Vitals:    07/02/20 1711   BP: (!) 94/54   Pulse: 75   Resp: 18   Temp: 99.1 °F (37.3 °C)   SpO2: 96%       MDM    Reassessment    No clear focus for infection identified. Patient's vital signs have been stable. He has been somewhat agitated here in the ED however he is easily redirectable. He is made several attempts to climb out of the bed. I do not feel we really have a clear picture of his baseline at this point. We will plan for admission, neuro checks and neurology consultation. CONSULTS:    Case was discussed with Dr. Melisa Ch regarding admission to the hospitalist service. PROCEDURES:  Unless otherwise noted below, none     Procedures    FINAL IMPRESSION      1.  Acute encephalopathy          DISPOSITION/PLAN   DISPOSITION Decision To Admit 07/02/2020 08:58:10 PM    (Please note that portions of this note were completed with a voice recognition program.  Efforts were made to edit thedictations but occasionally words are mis-transcribed.)    Leida Solitario MD (electronically signed)  Attending Emergency Physician         Leida Solitario MD  07/02/20 2534

## 2020-07-03 NOTE — PROGRESS NOTES
Tammy Tobias arrived to room # 311. Presented with: acute encephalopathy  Mental Status: Patient is oriented, alert, coherent, logical, thought processes intact and able to concentrate and follow conversation. Vitals:    07/02/20 2140   BP: 123/63   Pulse: 71   Resp: 19   Temp: 98.6 °F (37 °C)   SpO2: 98%     Patient safety contract and falls prevention contract reviewed with patient No. Pt confused. Oriented Patient to room. Call light within reach. Yes.   Needs, issues or concerns expressed at this time: no.      Electronically signed by Erika Diaz RN on 7/2/2020 at 10:03 PM

## 2020-07-03 NOTE — PROGRESS NOTES
Per Records from [address] [place] [address], pt's responsible party is [employer] (state guardianship). [address]: [phone]  Office: [phone]  Email: [email or web address]

## 2020-07-03 NOTE — PLAN OF CARE
Problem: Falls - Risk of:  Goal: Will remain free from falls  Description: Will remain free from falls  Outcome: Ongoing  Goal: Absence of physical injury  Description: Absence of physical injury  Outcome: Ongoing     Problem: Skin Integrity:  Goal: Will show no infection signs and symptoms  Description: Will show no infection signs and symptoms  Outcome: Ongoing  Goal: Absence of new skin breakdown  Description: Absence of new skin breakdown  Outcome: Ongoing     Problem: Confusion - Acute:  Goal: Absence of continued neurological deterioration signs and symptoms  Description: Absence of continued neurological deterioration signs and symptoms  Outcome: Ongoing  Goal: Mental status will be restored to baseline  Description: Mental status will be restored to baseline  Outcome: Ongoing     Problem: Discharge Planning:  Goal: Ability to perform activities of daily living will improve  Description: Ability to perform activities of daily living will improve  Outcome: Ongoing  Goal: Participates in care planning  Description: Participates in care planning  Outcome: Ongoing     Problem: Injury - Risk of, Physical Injury:  Goal: Will remain free from falls  Description: Will remain free from falls  Outcome: Ongoing  Goal: Absence of physical injury  Description: Absence of physical injury  Outcome: Ongoing     Problem: Mood - Altered:  Goal: Mood stable  Description: Mood stable  Outcome: Ongoing  Goal: Absence of abusive behavior  Description: Absence of abusive behavior  Outcome: Ongoing  Goal: Verbalizations of feeling emotionally comfortable while being cared for will increase  Description: Verbalizations of feeling emotionally comfortable while being cared for will increase  Outcome: Ongoing     Problem: Psychomotor Activity - Altered:  Goal: Absence of psychomotor disturbance signs and symptoms  Description: Absence of psychomotor disturbance signs and symptoms  Outcome: Ongoing     Problem: Sensory Perception - Impaired:  Goal: Demonstrations of improved sensory functioning will increase  Description: Demonstrations of improved sensory functioning will increase  Outcome: Ongoing  Goal: Decrease in sensory misperception frequency  Description: Decrease in sensory misperception frequency  Outcome: Ongoing  Goal: Able to refrain from responding to false sensory perceptions  Description: Able to refrain from responding to false sensory perceptions  Outcome: Ongoing  Goal: Demonstrates accurate environmental perceptions  Description: Demonstrates accurate environmental perceptions  Outcome: Ongoing  Goal: Able to distinguish between reality-based and nonreality-based thinking  Description: Able to distinguish between reality-based and nonreality-based thinking  Outcome: Ongoing  Goal: Able to interrupt nonreality-based thinking  Description: Able to interrupt nonreality-based thinking  Outcome: Ongoing     Problem: Sleep Pattern Disturbance:  Goal: Appears well-rested  Description: Appears well-rested  Outcome: Ongoing

## 2020-07-03 NOTE — PLAN OF CARE
Problem: Falls - Risk of:  Goal: Will remain free from falls  Description: Will remain free from falls  7/3/2020 1207 by Arnaldo Borden RN  Outcome: Ongoing  7/3/2020 0013 by Lucero Moise RN  Outcome: Ongoing  Goal: Absence of physical injury  Description: Absence of physical injury  7/3/2020 1207 by Arnaldo Borden RN  Outcome: Ongoing  7/3/2020 0013 by Lucero Moise RN  Outcome: Ongoing     Problem: Skin Integrity:  Goal: Will show no infection signs and symptoms  Description: Will show no infection signs and symptoms  7/3/2020 1207 by Arnaldo Borden RN  Outcome: Ongoing  7/3/2020 0013 by Lucero Moise RN  Outcome: Ongoing  Goal: Absence of new skin breakdown  Description: Absence of new skin breakdown  7/3/2020 1207 by Arnaldo Borden RN  Outcome: Ongoing  7/3/2020 0013 by Lucero Moise RN  Outcome: Ongoing     Problem: Confusion - Acute:  Goal: Absence of continued neurological deterioration signs and symptoms  Description: Absence of continued neurological deterioration signs and symptoms  7/3/2020 1207 by Arnaldo Borden RN  Outcome: Ongoing  7/3/2020 0013 by Lucero Moise RN  Outcome: Ongoing  Goal: Mental status will be restored to baseline  Description: Mental status will be restored to baseline  7/3/2020 1207 by Arnaldo Borden RN  Outcome: Ongoing  7/3/2020 0013 by Lucero Moise RN  Outcome: Ongoing     Problem: Discharge Planning:  Goal: Ability to perform activities of daily living will improve  Description: Ability to perform activities of daily living will improve  7/3/2020 1207 by Arnaldo Borden RN  Outcome: Ongoing  7/3/2020 0013 by Lucero Moise RN  Outcome: Ongoing  Goal: Participates in care planning  Description: Participates in care planning  7/3/2020 1207 by Arnaldo Borden RN  Outcome: Ongoing  7/3/2020 0013 by Lucero Moise RN  Outcome: Ongoing     Problem: Injury - Risk of, Physical Injury:  Goal: Will remain free from falls  Description: Will remain free from falls  7/3/2020 1207 by Arnaldo Borden RN  Outcome: Ongoing  7/3/2020 0013 by Gino Cerda RN  Outcome: Ongoing  Goal: Absence of physical injury  Description: Absence of physical injury  7/3/2020 1207 by Susie Chaudhary RN  Outcome: Ongoing  7/3/2020 0013 by Gino Cerda RN  Outcome: Ongoing     Problem: Mood - Altered:  Goal: Mood stable  Description: Mood stable  7/3/2020 1207 by Susie Chaudhary RN  Outcome: Ongoing  7/3/2020 0013 by Gino Cerda RN  Outcome: Ongoing  Goal: Absence of abusive behavior  Description: Absence of abusive behavior  7/3/2020 1207 by Susie Chaudhary RN  Outcome: Ongoing  7/3/2020 0013 by Gino Cerda RN  Outcome: Ongoing  Goal: Verbalizations of feeling emotionally comfortable while being cared for will increase  Description: Verbalizations of feeling emotionally comfortable while being cared for will increase  7/3/2020 1207 by Susie Chaudhary RN  Outcome: Ongoing  7/3/2020 0013 by Gino Cerda RN  Outcome: Ongoing     Problem: Psychomotor Activity - Altered:  Goal: Absence of psychomotor disturbance signs and symptoms  Description: Absence of psychomotor disturbance signs and symptoms  7/3/2020 1207 by Susie Chaudhary RN  Outcome: Ongoing  7/3/2020 0013 by Gino Cerda RN  Outcome: Ongoing     Problem: Sensory Perception - Impaired:  Goal: Demonstrations of improved sensory functioning will increase  Description: Demonstrations of improved sensory functioning will increase  7/3/2020 1207 by Susie Chaudhary RN  Outcome: Ongoing  7/3/2020 0013 by Gino Cerda RN  Outcome: Ongoing  Goal: Decrease in sensory misperception frequency  Description: Decrease in sensory misperception frequency  7/3/2020 1207 by Susie Chaudhary RN  Outcome: Ongoing  7/3/2020 0013 by Gino Cerda RN  Outcome: Ongoing  Goal: Able to refrain from responding to false sensory perceptions  Description: Able to refrain from responding to false sensory perceptions  7/3/2020 1207 by Susie Chaudhary RN  Outcome: Ongoing  7/3/2020 0013 by Gino Cerda RN  Outcome:

## 2020-07-03 NOTE — PROGRESS NOTES
Patient still confused and disoriented. Patient got dressed and attempted to leave. I went to patient and explained why he was here at the hospital and re-oriented him. Patient continues to deny coming from [address] [facility]. Patient has not slept all night, and continues to ramble on various topics. Sitter at bedside.

## 2020-07-03 NOTE — ED NOTES
Patient confused. Pt states that he needs to go to the  shop. Pt standing in the room. 2 ER staff attempting to help pt. Pt telling staff he does not need help and to go away. Assisted pt back to bed. Pt given 2 warm blankets. Sitter at bedside.       Gris Mcduffie RN  07/02/20 2011

## 2020-07-03 NOTE — PROGRESS NOTES
PHYSICAL THERAPY    Unable to see pt at this time due to an active bedrest order. Please discontinue to begin mobility assessment.     Electronically signed by Sabi Marrufo PT on 7/3/2020 at 7:07 AM
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
## 2020-07-04 LAB
ANION GAP SERPL CALCULATED.3IONS-SCNC: 10 MMOL/L (ref 7–19)
BASOPHILS ABSOLUTE: 0 K/UL (ref 0–0.2)
BASOPHILS RELATIVE PERCENT: 0.8 % (ref 0–1)
BUN BLDV-MCNC: 17 MG/DL (ref 8–23)
CALCIUM SERPL-MCNC: 8.5 MG/DL (ref 8.8–10.2)
CHLORIDE BLD-SCNC: 109 MMOL/L (ref 98–111)
CO2: 24 MMOL/L (ref 22–29)
CREAT SERPL-MCNC: 0.7 MG/DL (ref 0.5–1.2)
EOSINOPHILS ABSOLUTE: 0.1 K/UL (ref 0–0.6)
EOSINOPHILS RELATIVE PERCENT: 2.3 % (ref 0–5)
GFR NON-AFRICAN AMERICAN: >60
GLUCOSE BLD-MCNC: 99 MG/DL (ref 74–109)
HCT VFR BLD CALC: 37.6 % (ref 42–52)
HEMOGLOBIN: 12.3 G/DL (ref 14–18)
IMMATURE GRANULOCYTES #: 0 K/UL
LYMPHOCYTES ABSOLUTE: 1.8 K/UL (ref 1.1–4.5)
LYMPHOCYTES RELATIVE PERCENT: 34.7 % (ref 20–40)
MAGNESIUM: 2.1 MG/DL (ref 1.6–2.4)
MCH RBC QN AUTO: 30.1 PG (ref 27–31)
MCHC RBC AUTO-ENTMCNC: 32.7 G/DL (ref 33–37)
MCV RBC AUTO: 91.9 FL (ref 80–94)
MONOCYTES ABSOLUTE: 0.4 K/UL (ref 0–0.9)
MONOCYTES RELATIVE PERCENT: 7.9 % (ref 0–10)
NEUTROPHILS ABSOLUTE: 2.8 K/UL (ref 1.5–7.5)
NEUTROPHILS RELATIVE PERCENT: 53.9 % (ref 50–65)
PDW BLD-RTO: 13 % (ref 11.5–14.5)
PHOSPHORUS: 3.7 MG/DL (ref 2.5–4.5)
PLATELET # BLD: 152 K/UL (ref 130–400)
PMV BLD AUTO: 10.3 FL (ref 9.4–12.4)
POTASSIUM SERPL-SCNC: 4.2 MMOL/L (ref 3.5–5)
RBC # BLD: 4.09 M/UL (ref 4.7–6.1)
SODIUM BLD-SCNC: 143 MMOL/L (ref 136–145)
WBC # BLD: 5.2 K/UL (ref 4.8–10.8)

## 2020-07-04 PROCEDURE — 84100 ASSAY OF PHOSPHORUS: CPT

## 2020-07-04 PROCEDURE — 6370000000 HC RX 637 (ALT 250 FOR IP): Performed by: PSYCHIATRY & NEUROLOGY

## 2020-07-04 PROCEDURE — 83735 ASSAY OF MAGNESIUM: CPT

## 2020-07-04 PROCEDURE — 6370000000 HC RX 637 (ALT 250 FOR IP): Performed by: HOSPITALIST

## 2020-07-04 PROCEDURE — 36415 COLL VENOUS BLD VENIPUNCTURE: CPT

## 2020-07-04 PROCEDURE — 97161 PT EVAL LOW COMPLEX 20 MIN: CPT

## 2020-07-04 PROCEDURE — 1210000000 HC MED SURG R&B

## 2020-07-04 PROCEDURE — 2580000003 HC RX 258: Performed by: HOSPITALIST

## 2020-07-04 PROCEDURE — 80048 BASIC METABOLIC PNL TOTAL CA: CPT

## 2020-07-04 PROCEDURE — 85025 COMPLETE CBC W/AUTO DIFF WBC: CPT

## 2020-07-04 PROCEDURE — 6360000002 HC RX W HCPCS: Performed by: HOSPITALIST

## 2020-07-04 PROCEDURE — 99232 SBSQ HOSP IP/OBS MODERATE 35: CPT | Performed by: PSYCHIATRY & NEUROLOGY

## 2020-07-04 PROCEDURE — 99221 1ST HOSP IP/OBS SF/LOW 40: CPT | Performed by: PSYCHIATRY & NEUROLOGY

## 2020-07-04 PROCEDURE — 97116 GAIT TRAINING THERAPY: CPT

## 2020-07-04 RX ORDER — LAMOTRIGINE 100 MG/1
100 TABLET ORAL DAILY
Status: DISCONTINUED | OUTPATIENT
Start: 2020-07-05 | End: 2020-07-07 | Stop reason: HOSPADM

## 2020-07-04 RX ADMIN — ACETAMINOPHEN 1000 MG: 325 TABLET, FILM COATED ORAL at 13:31

## 2020-07-04 RX ADMIN — QUETIAPINE FUMARATE 100 MG: 100 TABLET ORAL at 21:01

## 2020-07-04 RX ADMIN — DONEPEZIL HYDROCHLORIDE 10 MG: 5 TABLET, FILM COATED ORAL at 08:09

## 2020-07-04 RX ADMIN — GABAPENTIN 300 MG: 300 CAPSULE ORAL at 08:10

## 2020-07-04 RX ADMIN — PRAMIPEXOLE DIHYDROCHLORIDE 1 MG: 1 TABLET ORAL at 08:09

## 2020-07-04 RX ADMIN — PRAMIPEXOLE DIHYDROCHLORIDE 1 MG: 1 TABLET ORAL at 13:29

## 2020-07-04 RX ADMIN — LAMOTRIGINE 150 MG: 25 TABLET ORAL at 21:00

## 2020-07-04 RX ADMIN — ACETAMINOPHEN 1000 MG: 325 TABLET, FILM COATED ORAL at 05:53

## 2020-07-04 RX ADMIN — SODIUM CHLORIDE: 9 INJECTION, SOLUTION INTRAVENOUS at 22:33

## 2020-07-04 RX ADMIN — TRAZODONE HYDROCHLORIDE 150 MG: 150 TABLET ORAL at 21:01

## 2020-07-04 RX ADMIN — GABAPENTIN 300 MG: 300 CAPSULE ORAL at 21:01

## 2020-07-04 RX ADMIN — LAMOTRIGINE 100 MG: 100 TABLET ORAL at 08:09

## 2020-07-04 RX ADMIN — ACETAMINOPHEN 1000 MG: 325 TABLET, FILM COATED ORAL at 21:00

## 2020-07-04 RX ADMIN — DONEPEZIL HYDROCHLORIDE 10 MG: 5 TABLET, FILM COATED ORAL at 21:00

## 2020-07-04 RX ADMIN — ENOXAPARIN SODIUM 40 MG: 40 INJECTION SUBCUTANEOUS at 21:02

## 2020-07-04 RX ADMIN — CITALOPRAM HYDROBROMIDE 20 MG: 20 TABLET ORAL at 21:01

## 2020-07-04 RX ADMIN — QUETIAPINE FUMARATE 100 MG: 100 TABLET ORAL at 13:29

## 2020-07-04 RX ADMIN — SODIUM CHLORIDE 100 ML/HR: 9 INJECTION, SOLUTION INTRAVENOUS at 08:10

## 2020-07-04 RX ADMIN — MELATONIN 9 MG: at 20:59

## 2020-07-04 RX ADMIN — QUETIAPINE FUMARATE 100 MG: 100 TABLET ORAL at 08:10

## 2020-07-04 RX ADMIN — PRAMIPEXOLE DIHYDROCHLORIDE 1 MG: 1 TABLET ORAL at 21:01

## 2020-07-04 ASSESSMENT — PAIN SCALES - GENERAL
PAINLEVEL_OUTOF10: 0
PAINLEVEL_OUTOF10: 0
PAINLEVEL_OUTOF10: 3
PAINLEVEL_OUTOF10: 5
PAINLEVEL_OUTOF10: 4

## 2020-07-04 NOTE — PROGRESS NOTES
melatonin  9 mg Oral Nightly    traZODone  150 mg Oral Nightly    acetaminophen  1,000 mg Oral 3 times per day    sodium chloride flush  10 mL Intravenous 2 times per day    enoxaparin  40 mg Subcutaneous Q24H        PRN:  sodium chloride flush, 10 mL, PRN  acetaminophen, 650 mg, Q6H PRN    Or  acetaminophen, 650 mg, Q6H PRN  magnesium hydroxide, 30 mL, Daily PRN  promethazine, 12.5 mg, Q6H PRN    Or  ondansetron, 4 mg, Q6H PRN        Infusions:   sodium chloride 100 mL/hr (07/04/20 0810)       Laboratory Data:  Recent Labs     07/02/20  1800 07/03/20 0139 07/04/20 0138   WBC 6.6 5.8 5.2   HGB 12.7* 12.9* 12.3*    159 152     Recent Labs     07/02/20  1800 07/03/20 0139 07/04/20 0138    140 143   K 3.9 4.3 4.2    105 109   CO2 26 26 24   BUN 16 14 17   CREATININE 0.7 0.7 0.7   GLUCOSE 96 90 99     Recent Labs     07/02/20  1800   AST 23   ALT 15   BILITOT 0.8   ALKPHOS 65     Troponin T: No results for input(s): TROPONINI in the last 72 hours. Pro-BNP: No results for input(s): BNP in the last 72 hours. INR: No results for input(s): INR in the last 72 hours. UA:  Recent Labs     07/02/20  1700   COLORU YELLOW   PHUR 6.5   CLARITYU Clear   SPECGRAV 1.014   LEUKOCYTESUR Negative   UROBILINOGEN 1.0   BILIRUBINUR Negative   BLOODU Negative   GLUCOSEU Negative     A1C: No results for input(s): LABA1C in the last 72 hours. ABG:No results for input(s): PHART, NXF4NFF, PO2ART, YOD2PVX, BEART, HGBAE, Q7NROZKV, CARBOXHGBART in the last 72 hours. Imaging:    Ct Head Wo Contrast    Result Date: 7/2/2020  1. Ventriculomegaly out of proportion to the degree of atrophy raising the differential of normal pressure hydrocephalus. The brain is otherwise unremarkable except for a empty sella. 2. Mucous retention cyst or polyp within the right maxillary sinus.  Signed by Dr Lawrence Rudd on 7/2/2020 7:29 PM       ASSESSMENT & PLAN:    Active Problems:    Metabolic encephalopathy    Parkinson disease [facility])    Alzheimer's dementia with behavioral disturbance ([facility] [address]    Impaired ambulation    Bipolar disorder ([facility] [address]  Resolved Problems:    * No resolved hospital problems. *    Continue with current medications  Continue with one-to-one sitter  Neurology consult appreciated  Patient had a good sleep last night after having a combination of melatonin and trazodone yesterday at 6 PM.  Awaiting Psychiatry consult for evaluation and further treatment recommendations  PT/OT evaluation ordered  Case management consult given for DC planning  Continue with IV hydration, cut down to 75 cc/h   Patient started on soft diet, to be advanced as tolerated  Labs reviewed in the chart which are stable      Repeat labs in a.m. Electrolyte replacement as per protocol. Patient will be monitored very closely on the floor. Further recommendations as per the hospital course. Discharge Plan: Next couple of days once patient is clinically and symptomatically stable and cleared by specialties on the case      Patient  is on DVT prophylaxis  Current medications reviewed  Lab work reviewed  Radiology/Chest x-ray films reviewed  Treatment recommendations from suspecialities reviewed, appreciated and agreed with  Discussed with the nurse and addressed all questions/concerns      Florentino Pineda MD  7/4/2020 10:51 AM      DISCLAIMER: This note was created with electronic voice recognition which does have occasional errors. If you have any questions regarding the content within the note please do not hesitate to contact me. .. Thanks.

## 2020-07-04 NOTE — PROGRESS NOTES
appears symmetric, good expansion,   normal effort without use of accessory muscles  Cardiovascular- RRR  Musculoskeletal - no significant wasting of muscles noted, no bony deformities, gait no gross ataxia  Extremities-no clubbing, cyanosis or edema  Skin - warm, dry, and intact. No rash, erythema, or pallor. Psychiatric - mood, affect, and behavior appear normal.      Neurology  NEUROLOGICAL EXAM:      Mental status   Awake, alert, fluent. Remains disoriented to place. Able to follow complex commands. Cranial Nerves   CN II- Visual fields grossly unremarkable  CN III, IV,VI-EOMI, No nystagmus, conjugate eye movements, no ptosis  CN VII-no facial asymmetry  CN VIII-Hearing intact   CN IX and X- Palate elevates in midline  CN XI-good shoulder shrug  CN XII-Tongue midline with no fasciculations or fibrillations          Motor function  Antigravity x 4     Sensory function Intact to light touch     Cerebellar F-N intact     Tremor  high-frequency resting tremor prominent in both arms     Gait                  Not tested           Nursing/pcp notes, imaging,labs and vitals reviewed. PT,OT and/or speech notes reviewed    Lab Results   Component Value Date    WBC 5.2 07/04/2020    HGB 12.3 (L) 07/04/2020    HCT 37.6 (L) 07/04/2020    MCV 91.9 07/04/2020     07/04/2020     Lab Results   Component Value Date     07/04/2020    K 4.2 07/04/2020     07/04/2020    CO2 24 07/04/2020    BUN 17 07/04/2020    CREATININE 0.7 07/04/2020    GLUCOSE 99 07/04/2020    CALCIUM 8.5 (L) 07/04/2020    PROT 6.2 (L) 07/02/2020    LABALBU 3.9 07/02/2020    BILITOT 0.8 07/02/2020    ALKPHOS 65 07/02/2020    AST 23 07/02/2020    ALT 15 07/02/2020    LABGLOM >60 07/04/2020   No results found for: INRNo results found for: PHENYTOIN, ESR, CRP          RECORD REVIEW: Previous medical records, medications were reviewed at today's visit    IMPRESSION:   1. Parkinson's disease with dementia. He is better today.  No obvious source for encephalopathy but could just be related to underlying dementia and lack of sleep. Continue trazodone/melatonin at nighttime to help improve this. No obvious focal signs or symptoms to suggest stroke or seizure. No obvious infection. Continue baseline medications  ? 2. Abnormal CT of the head. NPH is not felt to be very likely at all. ?3.  History of bipolar disorder.   Continue Lamictal

## 2020-07-04 NOTE — PROGRESS NOTES
Physical Therapy    Facility/Department: [facility] 3 APARNA/VAS/MED  Initial Assessment    NAME: Karen Tobias  : 1950  MRN: [id number]    Date of Service: 2020    Discharge Recommendations:           Assessment   Body structures, Functions, Activity limitations: Decreased functional mobility   Assessment: Patient will benefit from continuing skilled physical therapy  Treatment Diagnosis: Unsteady gait  Prognosis: Good  Decision Making: Low Complexity  REQUIRES PT FOLLOW UP: Yes  Activity Tolerance  Activity Tolerance: Patient Tolerated treatment well       Patient Diagnosis(es): The encounter diagnosis was Acute encephalopathy. has a past medical history of Anxiety, Bipolar 1 disorder ([facility] Dementia ([facility] [facility] and Parkinson disease ([facility] has no past surgical history on file.     Restrictions     Vision/Hearing        Subjective  General  Chart Reviewed: Yes  Diagnosis: Metabolic Encephalopathy  Follows Commands: Within Functional Limits  Subjective  Subjective: Agrees to work with therapy  Pain Screening  Patient Currently in Pain: Denies          Orientation  Orientation  Overall Orientation Status: Impaired  Social/Functional History     Cognition        Objective          PROM RLE (degrees)  RLE PROM: WFL  PROM LLE (degrees)  LLE PROM: WFL  Strength RLE  Comment: Grossly 4/5  Strength LLE  Comment: Grossly 4/5        Bed mobility  Supine to Sit: Modified independent  Sit to Supine: Modified independent  Scooting: Modified independent  Transfers  Sit to Stand: Contact guard assistance  Stand to sit: Contact guard assistance  Bed to Chair: Contact guard assistance  Ambulation  Ambulation?: Yes  WB Status: WBAT  Ambulation 1  Device: Hand-Held Assist  Assistance: Minimal assistance  Quality of Gait: Unsteady  Distance: 40 feet     Balance  Posture: Fair  Sitting - Static: Good  Sitting - Dynamic: Good  Standing - Static: Fair;-  Standing - Dynamic: Fair;-        Plan   Plan  Times per week: 7  Times per day: Daily  Plan weeks: 2  Current Treatment Recommendations: Strengthening, Functional Mobility Training, Gait Training, Transfer Training  Safety Devices  Type of devices: Bed alarm in place, Call light within reach, Patient at risk for falls, Left in bed, Sitter present    G-Code       OutComes Score                                                  AM-PAC Score             Goals  Short term goals  Time Frame for Short term goals: 2 weeks  Short term goal 1: Independent with transfers  Short term goal 2: Ambulate 400 feet with CGA       Therapy Time   Individual Concurrent Group Co-treatment   Time In           Time Out           Minutes                   Virginia Mcwilliams PT    Electronically signed by Virginia Mcwilliams PT on 7/4/2020 at 11:13 AM

## 2020-07-04 NOTE — PROGRESS NOTES
Physical Therapy    Daily Treatment Note    DATE:  2020  NAME:  Chaka Santiago  :  1950  (69 y.o.,male)  MRN:  [id number]      HOME LIVING:       RESTRICTIONS/PRECAUTIONS:         OVERALL  ORIENTATION STATUS:  Overall Orientation Status: Impaired    STRENGTH  Strength RLE  Comment: Grossly 4/5  Strength LLE  Comment: Grossly 4/5    ROM   PROM RLE (degrees)  RLE PROM: WFL  PROM LLE (degrees)  LLE PROM: WFL     BALANCE  Balance  Posture: Fair  Sitting - Static: Good  Sitting - Dynamic: Good  Standing - Static: Fair, -  Standing - Dynamic: Fair, -    BED MOBILITY  Bed Mobility  Scooting: Modified independent    TRANSFERS  Transfers  Sit to Stand: Contact guard assistance  Stand to sit: Contact guard assistance  Bed to Chair: Contact guard assistance    AMBULATION  Device: Hand-Held Assist  Assistance: Minimal assistance  Distance: 40 feet    STAIRS         COMMENTS:  Returned to bed at patient request  Call light within reach  Bed alarm activated  Patient instructed to request assistance before getting up  Sitter in room        Electronically signed by Navin Arndt PT on 2020 at 11:14 AM

## 2020-07-04 NOTE — PLAN OF CARE
Spoke with daughter this AM.  [facility] told her, her father was getting a  psychotherapeutic drug for Schizophrenia. Daughter states her father had never been diagnosis for schizophrenia before. I looked at patient's medications from [address] and he has been given Deutetrabenzine 12 mg BID. I did notify Dr. Rhona Phelps about this medication.

## 2020-07-04 NOTE — CONSULTS
[facility]  Psychiatry Consult    Reason for Consult: Concern   Behavioral instability, history of bipolar disorder    The primary source(s) of information include(s):  patient, documentation: Patient's chart    The patient is a 71 y.o. male with previous psychiatric history of bipolar disorder, Parkinson disease and Alzheimer's disorder, who has been admitted to medical services, secondary to episodes of behavioral instability, confusion and combative behavior. Patient's chart has been reviewed. During the initial evaluation in emergency department CAT scan of head has been performed and it was found that patient has ventriculomegaly out of proportion of the degree of atrophy raising the differential of normal pressure hydrocephalus. Patient has been evaluated by neurology and it was felt that NPH is not very likely at all. Patient has been seen in his room today. He was comfortably sitting on the bed and was wearing hospital attire. Patient looked slightly anxious, agitated and irritable. Patient stated that he has been diagnosed with bipolar disorder, Alzheimer's disorder and Parkinson disease, and has been on multiple medications for those conditions, which has been prescribed by his neurologist during the last 3 years. 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 Patient stated that dealing with people sometimes make him feel stressed, upset and frustrated, especially \"any time when they put a pressure on me\". During the interview patient denies any feeling of depression, stated that she feels mostly \"frustrated\", endorses feeling of anxiety, and agitation. He endorses good quality of sleep, denies any feeling of hopelessness or helplessness.   Patient denies current active suicidal or homicidal ideations, denies any plans. Also, he denies any auditory and visual hallucinations. He stated that he religiously preoccupied and believes that everything was happened is according to the God's will. Patient has been oriented only in his personal information, our president's name, stated that he is in [facility]. Patient experienced difficulties to say what kind of holiday is today, stated \"I know, it's going to be a party\". PSYCHIATRIC HISTORY:  Diagnoses: Bipolar disorder, Parkinson disease, Alzheimer disorder  Suicide attempts/gestures: Denies   Prior hospitalizations: Denies   Medication trials: Celexa, Atarax, donepezil, gabapentin, Lamictal, Xanax, pramipexole, Seroquel, melatonin, tramadol, deutetrabenazine  Mental health contact: Patient reported that for last 3 years his neurologist managed his psychotropic medications. Allergies:  Patient has no known allergies. EXAMINATION: CT head without contrast 7/2/2020   Dose: 1599 mGycm. Automated exposure control was utilized to diminish   patient radiation dose. Delwyn Scarce HISTORY: Confusion   FINDINGS: Multiple contiguous axial images are obtained from the skull   base to the vertex per protocol without venous contrast enhancement   with reformatted images obtained in the sagittal and coronal   projections from the original data set. There is ventriculomegaly out of proportion to the degree of atrophy. This does raise a differential of normal pressure hydrocephalus and   should be correlated clinically. There is no evidence of mass, mass effect or shift of the midline. No   evidence of acute infarct or hemorrhage. There is a empty sella. There is a 2 cm mucous retention cyst or polyp within the right   maxillary sinus. The visualized mastoid air cells and paranasal   sinuses are otherwise clear. No acute calvarial abnormalities are   present.       Impression   1.  Ventriculomegaly out of proportion to the degree of atrophy raising   the differential of normal pressure hydrocephalus. The brain is   otherwise unremarkable except for a empty sella. 2. Mucous retention cyst or polyp within the right maxillary sinus. Signed by Dr Laci Espinosa on 7/2/2020 7:29 PM       Mental Status  Appearance: Appropriately groomed and in hospital attire. Made good eye contact. Resting tremor of the right upper extremity noted. Behavior: Anxious, restless, slightly irritable, partially cooperative with interview. Mild to moderate psychomotor agitation appreciated. Speech: Slightly increased in tone, decrease in volume. Mood: \"Frustrated\"   Affect: Mood congruent. Range is intense  Thought Process: Mostly tangential  Thought Content: Patient does not have any current active suicidal and   homicidal ideations. No overt delusions or paranoia appreciated. Perceptions: Seems patient does not have any auditory or is hallucinations at present time. Patient did not appear to be responding to internal stimuli. No overt psychosis. Executive Functions: Appear impaired. Concentration: Poor  Reasoning: Appears impaired based on interaction from interview   Orientation: To person and situation. Pleasantly confused  Language: Intact. Memory: recent and remote appear impaired. Impulsivity: Questionable  Neurovegitative: Fair appetite, fair sleep  Insight: Impaired  Judgment: Impaired    Assessment  DSM 5 DIAGNOSIS:  Major neurocognitive disorder, moderate  Parkinson disease  Bipolar disorder    Recommendations  1. Currently patient is not suicidal, homicidal or psychotic. Patient does not meet criteria for psychiatric hospitalization. 2.  Patient experience episodes of agitation, which could be related to progression of Alzheimer's disorder or secondary to bipolar disorder. 3.  I would recommend to increase dose of the Lamictal from 100 mg twice a day to 100 mg in the morning and 150 mg in the evening for mood stabilization.   However, patient was informed that it would be no immediate effect of medication, and it could take up to 7-14 days until effect of medication could be evaluated. 4.  Patient has been prescribed Xanax previously, which primary treatment team put on hold. I would not recommend to restart medication, prescribed any other benzodiazepines, due to possible paradoxical effect of benzodiazepines in elderly population, especially in people with dementia. 5.  Seroquel is already prescribed at higher dose, then I would consider to prescribe to patient with dementia to manage aggression and agitation, mostly due to black box warning of antipsychotic medications, secondary to increased risk of the death, when they prescribed for patients with neurocognitive disorder. 6.  If patient would continue to have episodes of anxiety, agitation and aggression, I would recommend to discontinue donepezil, due to possible cholinergic effect of medication. 7.  Primary treatment team needs to consider patient's placement to a nursing home due to severity of the patient's dementia. 8.  Patient's nurse addressed family question about prescribed to patient deutetrabenazine Lizandro Garza). This medication is usually prescribed for patient with chorea and Harriman disease or tardive dyskinesia, secondary to side effect of 1st generation antipsychotic medications. At this point, I do not know real indications for prescribing this medication for our patient, and I would recommend to address this question to the physician, which has prescribed this medication to patient in nursing home.       Desmond Hendricks MD

## 2020-07-05 LAB
ANION GAP SERPL CALCULATED.3IONS-SCNC: 14 MMOL/L (ref 7–19)
BUN BLDV-MCNC: 18 MG/DL (ref 8–23)
CALCIUM SERPL-MCNC: 8.6 MG/DL (ref 8.8–10.2)
CHLORIDE BLD-SCNC: 109 MMOL/L (ref 98–111)
CO2: 23 MMOL/L (ref 22–29)
CREAT SERPL-MCNC: 0.7 MG/DL (ref 0.5–1.2)
GFR NON-AFRICAN AMERICAN: >60
GLUCOSE BLD-MCNC: 114 MG/DL (ref 74–109)
HCT VFR BLD CALC: 37 % (ref 42–52)
HEMOGLOBIN: 12 G/DL (ref 14–18)
MCH RBC QN AUTO: 30.3 PG (ref 27–31)
MCHC RBC AUTO-ENTMCNC: 32.4 G/DL (ref 33–37)
MCV RBC AUTO: 93.4 FL (ref 80–94)
PDW BLD-RTO: 13 % (ref 11.5–14.5)
PLATELET # BLD: 154 K/UL (ref 130–400)
PMV BLD AUTO: 10.6 FL (ref 9.4–12.4)
POTASSIUM SERPL-SCNC: 4.3 MMOL/L (ref 3.5–5)
RBC # BLD: 3.96 M/UL (ref 4.7–6.1)
SODIUM BLD-SCNC: 146 MMOL/L (ref 136–145)
URINE CULTURE, ROUTINE: NORMAL
WBC # BLD: 5.2 K/UL (ref 4.8–10.8)

## 2020-07-05 PROCEDURE — 99232 SBSQ HOSP IP/OBS MODERATE 35: CPT | Performed by: PSYCHIATRY & NEUROLOGY

## 2020-07-05 PROCEDURE — 85027 COMPLETE CBC AUTOMATED: CPT

## 2020-07-05 PROCEDURE — 6370000000 HC RX 637 (ALT 250 FOR IP): Performed by: PSYCHIATRY & NEUROLOGY

## 2020-07-05 PROCEDURE — 80048 BASIC METABOLIC PNL TOTAL CA: CPT

## 2020-07-05 PROCEDURE — 6360000002 HC RX W HCPCS: Performed by: HOSPITALIST

## 2020-07-05 PROCEDURE — 97116 GAIT TRAINING THERAPY: CPT

## 2020-07-05 PROCEDURE — 97530 THERAPEUTIC ACTIVITIES: CPT

## 2020-07-05 PROCEDURE — 2580000003 HC RX 258: Performed by: HOSPITALIST

## 2020-07-05 PROCEDURE — 1210000000 HC MED SURG R&B

## 2020-07-05 PROCEDURE — 36415 COLL VENOUS BLD VENIPUNCTURE: CPT

## 2020-07-05 PROCEDURE — 6370000000 HC RX 637 (ALT 250 FOR IP): Performed by: HOSPITALIST

## 2020-07-05 RX ADMIN — PRAMIPEXOLE DIHYDROCHLORIDE 1 MG: 1 TABLET ORAL at 08:03

## 2020-07-05 RX ADMIN — TRAZODONE HYDROCHLORIDE 150 MG: 150 TABLET ORAL at 20:26

## 2020-07-05 RX ADMIN — SODIUM CHLORIDE: 9 INJECTION, SOLUTION INTRAVENOUS at 16:00

## 2020-07-05 RX ADMIN — LAMOTRIGINE 100 MG: 100 TABLET ORAL at 08:03

## 2020-07-05 RX ADMIN — ACETAMINOPHEN 1000 MG: 325 TABLET, FILM COATED ORAL at 20:29

## 2020-07-05 RX ADMIN — PRAMIPEXOLE DIHYDROCHLORIDE 1 MG: 1 TABLET ORAL at 20:26

## 2020-07-05 RX ADMIN — DONEPEZIL HYDROCHLORIDE 10 MG: 5 TABLET, FILM COATED ORAL at 08:03

## 2020-07-05 RX ADMIN — QUETIAPINE FUMARATE 100 MG: 100 TABLET ORAL at 15:59

## 2020-07-05 RX ADMIN — GABAPENTIN 300 MG: 300 CAPSULE ORAL at 08:03

## 2020-07-05 RX ADMIN — ACETAMINOPHEN 1000 MG: 325 TABLET, FILM COATED ORAL at 15:59

## 2020-07-05 RX ADMIN — ACETAMINOPHEN 1000 MG: 325 TABLET, FILM COATED ORAL at 06:09

## 2020-07-05 RX ADMIN — CITALOPRAM HYDROBROMIDE 20 MG: 20 TABLET ORAL at 20:28

## 2020-07-05 RX ADMIN — QUETIAPINE FUMARATE 100 MG: 100 TABLET ORAL at 20:28

## 2020-07-05 RX ADMIN — ENOXAPARIN SODIUM 40 MG: 40 INJECTION SUBCUTANEOUS at 20:29

## 2020-07-05 RX ADMIN — QUETIAPINE FUMARATE 100 MG: 100 TABLET ORAL at 08:03

## 2020-07-05 RX ADMIN — MELATONIN 9 MG: at 20:26

## 2020-07-05 RX ADMIN — DONEPEZIL HYDROCHLORIDE 10 MG: 5 TABLET, FILM COATED ORAL at 20:26

## 2020-07-05 RX ADMIN — LAMOTRIGINE 150 MG: 25 TABLET ORAL at 20:28

## 2020-07-05 RX ADMIN — PRAMIPEXOLE DIHYDROCHLORIDE 1 MG: 1 TABLET ORAL at 15:58

## 2020-07-05 RX ADMIN — GABAPENTIN 300 MG: 300 CAPSULE ORAL at 20:28

## 2020-07-05 ASSESSMENT — PAIN DESCRIPTION - ORIENTATION: ORIENTATION: LOWER

## 2020-07-05 ASSESSMENT — PAIN SCALES - GENERAL
PAINLEVEL_OUTOF10: 8
PAINLEVEL_OUTOF10: 0
PAINLEVEL_OUTOF10: 0
PAINLEVEL_OUTOF10: 8
PAINLEVEL_OUTOF10: 0
PAINLEVEL_OUTOF10: 8
PAINLEVEL_OUTOF10: 0

## 2020-07-05 ASSESSMENT — PAIN DESCRIPTION - LOCATION: LOCATION: BACK

## 2020-07-05 ASSESSMENT — PAIN - FUNCTIONAL ASSESSMENT: PAIN_FUNCTIONAL_ASSESSMENT: PREVENTS OR INTERFERES SOME ACTIVE ACTIVITIES AND ADLS

## 2020-07-05 ASSESSMENT — PAIN DESCRIPTION - ONSET: ONSET: ON-GOING

## 2020-07-05 ASSESSMENT — PAIN DESCRIPTION - PROGRESSION: CLINICAL_PROGRESSION: NOT CHANGED

## 2020-07-05 ASSESSMENT — PAIN DESCRIPTION - DESCRIPTORS: DESCRIPTORS: ACHING

## 2020-07-05 ASSESSMENT — PAIN DESCRIPTION - PAIN TYPE: TYPE: CHRONIC PAIN

## 2020-07-05 ASSESSMENT — PAIN DESCRIPTION - FREQUENCY: FREQUENCY: CONTINUOUS

## 2020-07-05 NOTE — PROGRESS NOTES
Patient:   Yadi Packer  MR#:    [id number]   Room:    18/[facility]   YOB: 1950  Date of Progress Note: 7/5/2020  Time of Note                           7:34 AM  Consulting Physician:   Solmon Mcburney, M.D. Attending Physician:  Fawad Casas MD     CHIEF COMPLAINT: Agitation, confusion, abnormal CT of the head  ? Subjective  This 71 y.o. male who came in through the ED on 7/2 on transfer from a regional nursing home because of agitation and confusion. Since being here he is mildly confused but primarily agitated. Has a history of Parkinson's disease with secondary dementia and prominent resting tremor. He is typically followed at the [facility] clinic in [place]. Currently takes a combination of Seroquel, gabapentin, Aricept and Mirapex. Apparently has been on Nuedexta , namenda and neuplazid in the past, according to the records. Admission blood work and urinalysis unremarkable. CT scan of the head films are reviewed. There is deep and cortical atrophy although the radiologist read it as ventricular enlargement somewhat out of proportion to the degree of atrophy. Patient's mental status changes were relatively sudden. Slept some last night. No new complaints today. Not as agitated as he was upon admission. REVIEW OF SYSTEMS:  Constitutional: No fevers No chills  Neck:No stiffness  Respiratory: No shortness of breath  Cardiovascular: No chest pain No palpitations  Gastrointestinal: No abdominal pain    Genitourinary: No Dysuria  Neurological: No headache, no confusion      PHYSICAL EXAM:  BP (!) 105/57   Pulse 62   Temp 97.5 °F (36.4 °C) (Temporal)   Resp 17   SpO2 97%     Constitutional: he appears well-developed and well-nourished. Eyes - conjunctiva normal.  Pupils react to light  Ear, nose, throat -hearing intact to voice.  No scars, masses, or lesions over external nose or ears, no atrophy of tongue  Neck-symmetric, no masses noted, no jugular vein distension  Respiration- chest wall appears symmetric, good expansion,   normal effort without use of accessory muscles  Cardiovascular- RRR  Musculoskeletal - no significant wasting of muscles noted, no bony deformities, gait no gross ataxia  Extremities-no clubbing, cyanosis or edema  Skin - warm, dry, and intact. No rash, erythema, or pallor. Psychiatric - mood, affect, and behavior appear normal.      Neurology  NEUROLOGICAL EXAM:      Mental status   Awake, alert, fluent. Remains disoriented to place. Able to follow complex commands. Has word finding difficulties when asked questions     Cranial Nerves   CN II- Visual fields grossly unremarkable  CN III, IV,VI-EOMI, No nystagmus, conjugate eye movements, no ptosis  CN VII-no facial asymmetry  CN VIII-Hearing intact   CN IX and X- Palate elevates in midline  CN XI-good shoulder shrug  CN XII-Tongue midline with no fasciculations or fibrillations          Motor function  Antigravity x 4     Sensory function Intact to light touch     Cerebellar F-N intact     Tremor  high-frequency resting tremor prominent in both arms     Gait                  Not tested           Nursing/pcp notes, imaging,labs and vitals reviewed.      PT,OT and/or speech notes reviewed    Lab Results   Component Value Date    WBC 5.2 07/05/2020    HGB 12.0 (L) 07/05/2020    HCT 37.0 (L) 07/05/2020    MCV 93.4 07/05/2020     07/05/2020     Lab Results   Component Value Date     (H) 07/05/2020    K 4.3 07/05/2020     07/05/2020    CO2 23 07/05/2020    BUN 18 07/05/2020    CREATININE 0.7 07/05/2020    GLUCOSE 114 (H) 07/05/2020    CALCIUM 8.6 (L) 07/05/2020    PROT 6.2 (L) 07/02/2020    LABALBU 3.9 07/02/2020    BILITOT 0.8 07/02/2020    ALKPHOS 65 07/02/2020    AST 23 07/02/2020    ALT 15 07/02/2020    LABGLOM >60 07/05/2020   No results found for: INRNo results found for: PHENYTOIN, ESR, CRP          RECORD REVIEW: Previous medical records, medications were reviewed at today's visit    IMPRESSION:   1. Parkinson's disease with dementia. He is better today. No obvious source for encephalopathy but could just be related to underlying dementia and lack of sleep. Continue trazodone/melatonin at nighttime to help improve this. No obvious focal signs or symptoms to suggest stroke or seizure. No obvious infection. Continue baseline medications  ? 2. Abnormal CT of the head. NPH is not felt to be very likely at all. ?3.  History of bipolar disorder.   Continue Lamictal  No further neurological recommendations currently

## 2020-07-05 NOTE — PROGRESS NOTES
[place], [place], [address]        DEPARTMENT OF HOSPITALIST MEDICINE        PROGRESS  NOTE:    NOTE: Portions of this note have been copied forward, however, changed to reflect the most current clinical status of this patient. Patient:  Hayes Fonseca  YOB: 1950  Date of Service: 7/5/2020  MRN: [id number]   Acct: [de-identified]   Primary Care Physician: Stanton Lofton  Advance Directive: Full Code  Admit Date: 7/2/2020       Hospital Day: 3      CHIEF COMPLAINT:  Chief Complaint   Patient presents with    Altered Mental Status     arrived via EMS from [facility] with reports of change in mental status noted this am       SUBJECTIVE / [address]:    7/5/2020  Patient is clinically and symptomatically improved. His hand tremors are the best I have seen over the last few days. He was seen by psychiatrist who recommended some med changes. He does not meet criteria for inpatient psych admission, and psychiatry recommended skilled nursing facility placement for him. 7/4/2020  Patient feels a little better today. He had a good night's sleep yesterday and feels relaxed. He is more communicative and caring longer conversation. He still has pill-rolling movements of his hands, right more than left when he gets anxious. Neurology has advised to continue with Lamictal.  We are awaiting psychiatric consult. 7/3/2020  I saw and evaluated patient at the bedside today. He was having tremors of his hands likely secondary to Parkinson's disease. Neurology evaluated the patient and ruled out normal pressure hydrocephalus. They are giving him combination of medications to help him sleep tonight. I am going to order psychiatric evaluation as well PT/OT and case management consult for DC planning. 7/2/2020  HISTORY OF PRESENT ILLNESS:   This 71 y.o. male who came in through the ED on 7/2 on transfer from a regional nursing home because of agitation and confusion. left)   Psychiatric:  Unable to be obtained . .. Patient is pleasantly confused! CURRENT MEDICATIONS:  Scheduled:   lamoTRIgine  100 mg Oral Daily    lamoTRIgine  150 mg Oral Nightly    citalopram  20 mg Oral Nightly    donepezil  10 mg Oral BID    gabapentin  300 mg Oral BID    QUEtiapine  100 mg Oral TID    pramipexole  1 mg Oral TID    melatonin  9 mg Oral Nightly    traZODone  150 mg Oral Nightly    acetaminophen  1,000 mg Oral 3 times per day    sodium chloride flush  10 mL Intravenous 2 times per day    enoxaparin  40 mg Subcutaneous Q24H        PRN:  sodium chloride flush, 10 mL, PRN  acetaminophen, 650 mg, Q6H PRN    Or  acetaminophen, 650 mg, Q6H PRN  magnesium hydroxide, 30 mL, Daily PRN  promethazine, 12.5 mg, Q6H PRN    Or  ondansetron, 4 mg, Q6H PRN        Infusions:   sodium chloride 75 mL/hr at 07/04/20 2233       Laboratory Data:  Recent Labs     07/03/20 0139 07/04/20  0138 07/05/20  0144   WBC 5.8 5.2 5.2   HGB 12.9* 12.3* 12.0*    152 154     Recent Labs     07/03/20  0139 07/04/20  0138 07/05/20  0144    143 146*   K 4.3 4.2 4.3    109 109   CO2 26 24 23   BUN 14 17 18   CREATININE 0.7 0.7 0.7   GLUCOSE 90 99 114*     Recent Labs     07/02/20  1800   AST 23   ALT 15   BILITOT 0.8   ALKPHOS 65     Troponin T: No results for input(s): TROPONINI in the last 72 hours. Pro-BNP: No results for input(s): BNP in the last 72 hours. INR: No results for input(s): INR in the last 72 hours. UA:  Recent Labs     07/02/20  1700   COLORU YELLOW   PHUR 6.5   CLARITYU Clear   SPECGRAV 1.014   LEUKOCYTESUR Negative   UROBILINOGEN 1.0   BILIRUBINUR Negative   BLOODU Negative   GLUCOSEU Negative     A1C: No results for input(s): LABA1C in the last 72 hours. ABG:No results for input(s): PHART, EUU3LRR, PO2ART, ATF7QTC, BEART, HGBAE, B3VPNEBW, CARBOXHGBART in the last 72 hours. Imaging:    Ct Head Wo Contrast    Result Date: 7/2/2020  1.  Ventriculomegaly out of proportion to the degree of atrophy raising the differential of normal pressure hydrocephalus. The brain is otherwise unremarkable except for a empty sella. 2. Mucous retention cyst or polyp within the right maxillary sinus. Signed by Dr Farrah Conley on 7/2/2020 7:29 PM       ASSESSMENT & PLAN:    Active Problems:    Metabolic encephalopathy    Parkinson disease ([address]    Alzheimer's dementia with behavioral disturbance ([address]    Impaired ambulation    Bipolar disorder ([address]  Resolved Problems:    * No resolved hospital problems. *    Continue with current medications  Continue with one-to-one sitter  Psychiatry consult reviewed and appreciated  Psychiatric med changes as per psychiatrist  Patient is having good night sleep after having a combination of melatonin and trazodone   Follow-up closely as per neurology and psychiatry  PT/OT evaluation ordered  Case management consult given for DC planning to skilled nursing facility  Continue with IV hydration @75 cc/h   Continue with soft and bite sized diet for the patient      Repeat labs in a.m. Electrolyte replacement as per protocol. Patient will be monitored very closely on the floor. Further recommendations as per the hospital course. Discharge Plan: To skilled nursing facility in a.m. once arranged by case management    Patient  is on DVT prophylaxis  Current medications reviewed  Lab work reviewed  Radiology/Chest x-ray films reviewed  Treatment recommendations from suspecialities reviewed, appreciated and agreed with  Discussed with the nurse and addressed all questions/concerns      Florentino Pineda MD  7/5/2020 2:19 PM      DISCLAIMER: This note was created with electronic voice recognition which does have occasional errors. If you have any questions regarding the content within the note please do not hesitate to contact me. .. Thanks.

## 2020-07-06 PROBLEM — E87.6 HYPOKALEMIA: Status: ACTIVE | Noted: 2020-07-06

## 2020-07-06 PROBLEM — G93.41 METABOLIC ENCEPHALOPATHY: Status: RESOLVED | Noted: 2020-07-02 | Resolved: 2020-07-06

## 2020-07-06 LAB
ANION GAP SERPL CALCULATED.3IONS-SCNC: 10 MMOL/L (ref 7–19)
BUN BLDV-MCNC: 15 MG/DL (ref 8–23)
CALCIUM SERPL-MCNC: 8.4 MG/DL (ref 8.8–10.2)
CHLORIDE BLD-SCNC: 110 MMOL/L (ref 98–111)
CO2: 22 MMOL/L (ref 22–29)
CREAT SERPL-MCNC: 0.7 MG/DL (ref 0.5–1.2)
GFR NON-AFRICAN AMERICAN: >60
GLUCOSE BLD-MCNC: 118 MG/DL (ref 74–109)
HCT VFR BLD CALC: 36.3 % (ref 42–52)
HEMOGLOBIN: 11.7 G/DL (ref 14–18)
MCH RBC QN AUTO: 30 PG (ref 27–31)
MCHC RBC AUTO-ENTMCNC: 32.2 G/DL (ref 33–37)
MCV RBC AUTO: 93.1 FL (ref 80–94)
PDW BLD-RTO: 13 % (ref 11.5–14.5)
PLATELET # BLD: 143 K/UL (ref 130–400)
PMV BLD AUTO: 10.3 FL (ref 9.4–12.4)
POTASSIUM SERPL-SCNC: 3.3 MMOL/L (ref 3.5–5)
RBC # BLD: 3.9 M/UL (ref 4.7–6.1)
SODIUM BLD-SCNC: 142 MMOL/L (ref 136–145)
WBC # BLD: 4.7 K/UL (ref 4.8–10.8)

## 2020-07-06 PROCEDURE — 1210000000 HC MED SURG R&B

## 2020-07-06 PROCEDURE — 2580000003 HC RX 258: Performed by: HOSPITALIST

## 2020-07-06 PROCEDURE — 99232 SBSQ HOSP IP/OBS MODERATE 35: CPT | Performed by: PSYCHIATRY & NEUROLOGY

## 2020-07-06 PROCEDURE — 6370000000 HC RX 637 (ALT 250 FOR IP): Performed by: HOSPITALIST

## 2020-07-06 PROCEDURE — 6360000002 HC RX W HCPCS: Performed by: HOSPITALIST

## 2020-07-06 PROCEDURE — 80048 BASIC METABOLIC PNL TOTAL CA: CPT

## 2020-07-06 PROCEDURE — U0002 COVID-19 LAB TEST NON-CDC: HCPCS

## 2020-07-06 PROCEDURE — 6370000000 HC RX 637 (ALT 250 FOR IP): Performed by: PSYCHIATRY & NEUROLOGY

## 2020-07-06 PROCEDURE — 85027 COMPLETE CBC AUTOMATED: CPT

## 2020-07-06 PROCEDURE — U0003 INFECTIOUS AGENT DETECTION BY NUCLEIC ACID (DNA OR RNA); SEVERE ACUTE RESPIRATORY SYNDROME CORONAVIRUS 2 (SARS-COV-2) (CORONAVIRUS DISEASE [COVID-19]), AMPLIFIED PROBE TECHNIQUE, MAKING USE OF HIGH THROUGHPUT TECHNOLOGIES AS DESCRIBED BY CMS-2020-01-R: HCPCS

## 2020-07-06 PROCEDURE — 36415 COLL VENOUS BLD VENIPUNCTURE: CPT

## 2020-07-06 RX ORDER — POTASSIUM CHLORIDE 20 MEQ/1
40 TABLET, EXTENDED RELEASE ORAL ONCE
Status: COMPLETED | OUTPATIENT
Start: 2020-07-06 | End: 2020-07-06

## 2020-07-06 RX ORDER — TRAMADOL HYDROCHLORIDE 50 MG/1
50 TABLET ORAL EVERY 6 HOURS PRN
Qty: 12 TABLET | Refills: 0 | Status: SHIPPED | OUTPATIENT
Start: 2020-07-06 | End: 2020-07-09

## 2020-07-06 RX ORDER — ALPRAZOLAM 0.25 MG/1
0.25 TABLET ORAL 2 TIMES DAILY PRN
Qty: 6 TABLET | Refills: 0 | Status: SHIPPED | OUTPATIENT
Start: 2020-07-06 | End: 2020-07-09

## 2020-07-06 RX ORDER — TRAZODONE HYDROCHLORIDE 150 MG/1
150 TABLET ORAL NIGHTLY
Qty: 30 TABLET | Refills: 0 | Status: SHIPPED | OUTPATIENT
Start: 2020-07-06 | End: 2020-08-05

## 2020-07-06 RX ORDER — LANOLIN ALCOHOL/MO/W.PET/CERES
9 CREAM (GRAM) TOPICAL NIGHTLY
Qty: 90 TABLET | Refills: 0 | Status: SHIPPED | OUTPATIENT
Start: 2020-07-06 | End: 2020-08-05

## 2020-07-06 RX ORDER — POTASSIUM CHLORIDE 7.45 MG/ML
10 INJECTION INTRAVENOUS ONCE
Status: COMPLETED | OUTPATIENT
Start: 2020-07-06 | End: 2020-07-06

## 2020-07-06 RX ADMIN — SODIUM CHLORIDE, PRESERVATIVE FREE 10 ML: 5 INJECTION INTRAVENOUS at 22:16

## 2020-07-06 RX ADMIN — DONEPEZIL HYDROCHLORIDE 10 MG: 5 TABLET, FILM COATED ORAL at 22:13

## 2020-07-06 RX ADMIN — GABAPENTIN 300 MG: 300 CAPSULE ORAL at 08:06

## 2020-07-06 RX ADMIN — TRAZODONE HYDROCHLORIDE 150 MG: 150 TABLET ORAL at 22:15

## 2020-07-06 RX ADMIN — LAMOTRIGINE 100 MG: 100 TABLET ORAL at 08:06

## 2020-07-06 RX ADMIN — LAMOTRIGINE 150 MG: 25 TABLET ORAL at 22:14

## 2020-07-06 RX ADMIN — ENOXAPARIN SODIUM 40 MG: 40 INJECTION SUBCUTANEOUS at 22:13

## 2020-07-06 RX ADMIN — DONEPEZIL HYDROCHLORIDE 10 MG: 5 TABLET, FILM COATED ORAL at 08:06

## 2020-07-06 RX ADMIN — ACETAMINOPHEN 1000 MG: 325 TABLET, FILM COATED ORAL at 22:14

## 2020-07-06 RX ADMIN — PRAMIPEXOLE DIHYDROCHLORIDE 1 MG: 1 TABLET ORAL at 22:13

## 2020-07-06 RX ADMIN — SODIUM CHLORIDE: 9 INJECTION, SOLUTION INTRAVENOUS at 05:47

## 2020-07-06 RX ADMIN — QUETIAPINE FUMARATE 100 MG: 100 TABLET ORAL at 22:14

## 2020-07-06 RX ADMIN — ACETAMINOPHEN 1000 MG: 325 TABLET, FILM COATED ORAL at 15:38

## 2020-07-06 RX ADMIN — GABAPENTIN 300 MG: 300 CAPSULE ORAL at 22:13

## 2020-07-06 RX ADMIN — QUETIAPINE FUMARATE 100 MG: 100 TABLET ORAL at 08:06

## 2020-07-06 RX ADMIN — POTASSIUM CHLORIDE 10 MEQ: 7.46 INJECTION, SOLUTION INTRAVENOUS at 11:06

## 2020-07-06 RX ADMIN — QUETIAPINE FUMARATE 100 MG: 100 TABLET ORAL at 15:38

## 2020-07-06 RX ADMIN — MELATONIN 9 MG: at 22:13

## 2020-07-06 RX ADMIN — CITALOPRAM HYDROBROMIDE 20 MG: 20 TABLET ORAL at 22:13

## 2020-07-06 RX ADMIN — PRAMIPEXOLE DIHYDROCHLORIDE 1 MG: 1 TABLET ORAL at 08:06

## 2020-07-06 RX ADMIN — PRAMIPEXOLE DIHYDROCHLORIDE 1 MG: 1 TABLET ORAL at 15:38

## 2020-07-06 RX ADMIN — ACETAMINOPHEN 1000 MG: 325 TABLET, FILM COATED ORAL at 05:44

## 2020-07-06 RX ADMIN — POTASSIUM CHLORIDE 40 MEQ: 1500 TABLET, EXTENDED RELEASE ORAL at 22:15

## 2020-07-06 ASSESSMENT — PAIN SCALES - GENERAL
PAINLEVEL_OUTOF10: 5
PAINLEVEL_OUTOF10: 7
PAINLEVEL_OUTOF10: 9
PAINLEVEL_OUTOF10: 0

## 2020-07-06 NOTE — PROGRESS NOTES
[facility] in lab reported that patient's Covid test will not be ran until tomorrow.   Electronically signed by Ammy Vasques RN on 7/6/2020 at 4:44 PM

## 2020-07-06 NOTE — CARE COORDINATION
SW placed call to [employer] [facility]; ([phone]); to schedule Pt's ride to SNF from hospital via [facility].  Scheduled for pick-up at [facility]; conf #[id number]    Electronically signed by MARLYS Vargas on 7/6/2020 at 4:10 PM

## 2020-07-06 NOTE — PROGRESS NOTES
Patient:   Danielle Piña  MR#:    [id number]   Room:    [address] [place] [address]   YOB: 1950  Date of Progress Note: 7/6/2020  Time of Note                           7:34 AM  Consulting Physician:   Luis Monroy M.D. Attending Physician:  Jaime Miller MD     CHIEF COMPLAINT: Agitation, confusion, abnormal CT of the head  ? Subjective  This 71 y.o. male who came in through the ED on 7/2 on transfer from a regional nursing home because of agitation and confusion. Since being here he is mildly confused but primarily agitated. Has a history of Parkinson's disease with secondary dementia and prominent resting tremor. He is typically followed at the [facility] in [place]. Currently takes a combination of Seroquel, gabapentin, Aricept and Mirapex. Apparently has been on Nuedexta , namenda and neuplazid in the past, according to the records. Admission blood work and urinalysis unremarkable. CT scan of the head films are reviewed. There is deep and cortical atrophy although the radiologist read it as ventricular enlargement somewhat out of proportion to the degree of atrophy. Patient's mental status changes were relatively sudden. No Complaints this morning. No new neurological difficulties. REVIEW OF SYSTEMS:  Constitutional: No fevers No chills  Neck:No stiffness  Respiratory: No shortness of breath  Cardiovascular: No chest pain No palpitations  Gastrointestinal: No abdominal pain    Genitourinary: No Dysuria  Neurological: No headache, no confusion      PHYSICAL EXAM:  /74   Pulse 67   Temp 98.7 °F (37.1 °C) (Temporal)   Resp 16   SpO2 96%     Constitutional: he appears well-developed and well-nourished. Eyes - conjunctiva normal.  Pupils react to light  Ear, nose, throat -hearing intact to voice.  No scars, masses, or lesions over external nose or ears, no atrophy of tongue  Neck-symmetric, no masses noted, no jugular vein distension  Respiration- chest wall appears symmetric, good expansion,   normal effort without use of accessory muscles  Cardiovascular- RRR  Musculoskeletal - no significant wasting of muscles noted, no bony deformities, gait no gross ataxia  Extremities-no clubbing, cyanosis or edema  Skin - warm, dry, and intact. No rash, erythema, or pallor. Psychiatric - mood, affect, and behavior appear normal.      Neurology  NEUROLOGICAL EXAM:      Mental status   Awake, alert, fluent. Remains disoriented to place. Able to follow complex commands. Has word finding difficulties when asked questions     Cranial Nerves   CN II- Visual fields grossly unremarkable  CN III, IV,VI-EOMI, No nystagmus, conjugate eye movements, no ptosis  CN VII-no facial asymmetry  CN VIII-Hearing intact   CN IX and X- Palate elevates in midline  CN XI-good shoulder shrug  CN XII-Tongue midline with no fasciculations or fibrillations          Motor function  Antigravity x 4     Sensory function Intact to light touch     Cerebellar F-N intact     Tremor  high-frequency resting tremor prominent in both arms     Gait                  Not tested           Nursing/pcp notes, imaging,labs and vitals reviewed. PT,OT and/or speech notes reviewed    Lab Results   Component Value Date    WBC 4.7 (L) 07/06/2020    HGB 11.7 (L) 07/06/2020    HCT 36.3 (L) 07/06/2020    MCV 93.1 07/06/2020     07/06/2020     Lab Results   Component Value Date     07/06/2020    K 3.3 (L) 07/06/2020     07/06/2020    CO2 22 07/06/2020    BUN 15 07/06/2020    CREATININE 0.7 07/06/2020    GLUCOSE 118 (H) 07/06/2020    CALCIUM 8.4 (L) 07/06/2020    PROT 6.2 (L) 07/02/2020    LABALBU 3.9 07/02/2020    BILITOT 0.8 07/02/2020    ALKPHOS 65 07/02/2020    AST 23 07/02/2020    ALT 15 07/02/2020    LABGLOM >60 07/06/2020   No results found for: INRNo results found for: PHENYTOIN, ESR, CRP          RECORD REVIEW: Previous medical records, medications were reviewed at today's visit    IMPRESSION:   1.

## 2020-07-06 NOTE — PROGRESS NOTES
[facility] transportation cancelled for tonight's 6pm .       Electronically signed by Han Morton RN on 7/6/2020 at 5:04 PM

## 2020-07-06 NOTE — PROGRESS NOTES
Patient has a MEWS Score of 4. Patient has agreed to let his Telemetry monitor put back on. Will recheck vital signs in 2 hours.

## 2020-07-06 NOTE — DISCHARGE SUMMARY
[place], [place], [address]        DEPARTMENT OF HOSPITALIST MEDICINE        DISCHARGE SUMMARY:      PATIENT NAME:  Audrey Oliva  :  1950  MRN:  [id number]    Admission Date:   2020  5:13 PM Attending: Tiffany Elliott MD   Discharge Date:   2020              PCP: Medardo Macedo  Length of Stay: 4 days     Chief Complaint on Admission:   Chief Complaint   Patient presents with    Altered Mental Status     arrived via EMS from [facility] with reports of change in mental status noted this am       Consultants:     IP CONSULT TO [address] [facility] TO CASE MANAGEMENT       Discharge Problem List:   Active Problems:    Parkinson disease ([facility] [address]    Alzheimer's dementia with behavioral disturbance ([facility] [address]    Impaired ambulation    Bipolar disorder ([facility] [address]  Resolved Problems:    Metabolic encephalopathy       [address]  COURSE  AND  TREATMENT:    2020  Patient feels much better today. His hand tremors have almost resolved. He feels better, is back to his baseline, and he wants to go home. He is cleared from both neurology and psychiatry standpoint to be discharged. Case management is working on call with testing and his transfer back to skilled nursing facility. Please see the neurology and psychiatry notes as below:    NEUROLOGY . .. IMPRESSION . .. 2020:   1.  Parkinson's disease with dementia.    He is better today. No obvious source for encephalopathy but could just be related to underlying dementia and lack of sleep.   Continue trazodone/melatonin at nighttime to help improve this. No obvious focal signs or symptoms to suggest stroke or seizure.  No obvious infection.  Continue baseline medications  ? 2.  Abnormal CT of the head. [facility] is not felt to be very likely at all. ?3.  History of bipolar disorder.  Continue Lamictal  No further neurological recommendations currently. Discharge okay with me.   Please contact if needed    Patricia Newman MD Physician   Neurology   Progress Notes   Signed   Date of Service:  7/6/2020  7:34 AM     ----------------------------------------------------------------------------------------------------------------------    PSYCHIATRY . .. Assessment . .. 7/4/2020:  DSM 5 DIAGNOSIS:  Major neurocognitive disorder, moderate  Parkinson disease  Bipolar disorder     Recommendations  1. Currently patient is not suicidal, homicidal or psychotic. Patient does not meet criteria for psychiatric hospitalization. 2.  Patient experience episodes of agitation, which could be related to progression of Alzheimer's disorder or secondary to bipolar disorder. 3.  I would recommend to increase dose of the Lamictal from 100 mg twice a day to 100 mg in the morning and 150 mg in the evening for mood stabilization. However, patient was informed that it would be no immediate effect of medication, and it could take up to 7-14 days until effect of medication could be evaluated. 4.  Patient has been prescribed Xanax previously, which primary treatment team put on hold. I would not recommend to restart medication, prescribed any other benzodiazepines, due to possible paradoxical effect of benzodiazepines in elderly population, especially in people with dementia. 5.  Seroquel is already prescribed at higher dose, then I would consider to prescribe to patient with dementia to manage aggression and agitation, mostly due to black box warning of antipsychotic medications, secondary to increased risk of the death, when they prescribed for patients with neurocognitive disorder. 6.  If patient would continue to have episodes of anxiety, agitation and aggression, I would recommend to discontinue donepezil, due to possible cholinergic effect of medication. 7.  Primary treatment team needs to consider patient's placement to a nursing home due to severity of the patient's dementia.   8.  Patient's nurse addressed family question about prescribed to patient deutetrabenazine (Austedo). This medication is usually prescribed for patient with chorea and Derrek disease or tardive dyskinesia, secondary to side effect of 1st generation antipsychotic medications. At this point, I do not know real indications for prescribing this medication for our patient, and I would recommend to address this question to the physician, which has prescribed this medication to patient in nursing home.        eNgrito Hairston MD    7/5/2020  Patient is clinically and symptomatically improved. His hand tremors are the best I have seen over the last few days. He was seen by psychiatrist who recommended some med changes. He does not meet criteria for inpatient psych admission, and psychiatry recommended skilled nursing facility placement for him.     7/4/2020  Patient feels a little better today. He had a good night's sleep yesterday and feels relaxed. He is more communicative and caring longer conversation. He still has pill-rolling movements of his hands, right more than left when he gets anxious. Neurology has advised to continue with Lamictal.  We are awaiting psychiatric consult.     7/3/2020  I saw and evaluated patient at the bedside today. He was having tremors of his hands likely secondary to Parkinson's disease. Neurology evaluated the patient and ruled out normal pressure hydrocephalus. They are giving him combination of medications to help him sleep tonight. I am going to order psychiatric evaluation as well PT/OT and case management consult for DC planning.     7/2/2020  HISTORY OF PRESENT ILLNESS:   Hamilton [age] y. o. male who came in through the ED on 7/2 on transfer from a regional nursing home because of agitation and confusion.  Since being here he is mildly confused but primarily agitated.  Has a history of Parkinson's disease with secondary dementia and prominent resting tremor.  He is typically followed at the [facility] in [facility]. [address] takes a combination of Seroquel, gabapentin, Aricept and Mirapex.  Apparently has been on Nuedexta , namenda and neuplazid in the past, according to the records. Stoney Lowe blood work and urinalysis unremarkable.  CT scan of the head films are reviewed. Stefany Leigh is deep and cortical atrophy although the radiologist read it as ventricular enlargement somewhat out of proportion to the degree of atrophy.  Patient's mental status changes were relatively sudden.  Also, noteworthy is the fact that he had no sleep last night and or the night before as best we can tell.       OBJECTIVE:  /74   Pulse 67   Temp 98.7 °F (37.1 °C) (Temporal)   Resp 16   SpO2 96%       Heart: RRR   Lungs: Clear   Abdomen: Soft, non-tender   Extremities: No edema   Neurologic: Alert and oriented   Skin: Warm and dry          Laboratory Data:  Recent Labs     07/04/20 0138 07/05/20 0144 07/06/20 0132   WBC 5.2 5.2 4.7*   HGB 12.3* 12.0* 11.7*    154 143     Recent Labs     07/04/20 0138 07/05/20 0144 07/06/20  0132    146* 142   K 4.2 4.3 3.3*    109 110   CO2 24 23 22   BUN 17 18 15   CREATININE 0.7 0.7 0.7   GLUCOSE 99 114* 118*     No results for input(s): AST, ALT, ALB, BILITOT, ALKPHOS in the last 72 hours. Troponin T: No results for input(s): TROPONINI in the last 72 hours. Pro-BNP: No results for input(s): BNP in the last 72 hours. INR: No results for input(s): INR in the last 72 hours. UA:No results for input(s): NITRITE, COLORU, PHUR, LABCAST, WBCUA, RBCUA, MUCUS, TRICHOMONAS, YEAST, BACTERIA, CLARITYU, SPECGRAV, LEUKOCYTESUR, UROBILINOGEN, BILIRUBINUR, BLOODU, GLUCOSEU, AMORPHOUS in the last 72 hours. Invalid input(s): [place]  A1C: No results for input(s): LABA1C in the last 72 hours. ABG:No results for input(s): PHART, LDW8BYD, PO2ART, XDU2LNI, BEART, HGBAE, S9WDMYHB, CARBOXHGBART in the last 72 hours. Impressions of imaging performed in 48 hours before discharge:    No results found. Stephany Banuelos   Home Medication Instructions AALIYAH:[id number]    Printed on:07/06/20 3268   Medication Information                      ALPRAZolam (XANAX) 0.25 MG tablet  Take 1 tablet by mouth 2 times daily as needed for Anxiety for up to 3 days. citalopram (CELEXA) 20 MG tablet  Take 20 mg by mouth nightly             Deutetrabenazine 12 MG TABS  Take 12 mg by mouth 2 times daily             donepezil (ARICEPT) 10 MG tablet  Take 10 mg by mouth 2 times daily             fluconazole (DIFLUCAN) 150 MG tablet  Take 150 mg by mouth once a week             gabapentin (NEURONTIN) 300 MG capsule  Take 300 mg by mouth 2 times daily. hydrOXYzine (ATARAX) 25 MG tablet  Take 25 mg by mouth nightly             lamoTRIgine (LAMICTAL) 100 MG tablet  Take 100 mg by mouth 2 times daily             medroxyPROGESTERone (PROVERA) 10 MG tablet  Take 10 mg by mouth 2 times daily             melatonin 3 MG TABS tablet  Take 3 tablets by mouth nightly             pramipexole (MIRAPEX) 1 MG tablet  Take 1 mg by mouth 3 times daily             QUEtiapine (SEROQUEL) 100 MG tablet  Take 100 mg by mouth 3 times daily             traMADol (ULTRAM) 50 MG tablet  Take 1 tablet by mouth every 6 hours as needed for Pain for up to 3 days. traZODone (DESYREL) 150 MG tablet  Take 1 tablet by mouth nightly                   ISSUES TO BE ADDRESSED AT HOSPITAL FOLLOW-UP VISIT:  Patient will need close follow-up with PCP, neurology and psychiatry as an outpatient. Condition on Discharge: gradually improving  Discharge Disposition: Skilled Facility    Recommended Follow Up:  Leopoldo Pippin  [address] [place], [place] [address]. [facility]          Followup Appointments Scheduled at Time of Discharge:  No future appointments. Discharge Instructions:   Please see the discharge paperwork. Patient was seen at bedside today, and the examination shows improvement since yesterday.     Detailed discharge directions delivered to the patient by myself and our nursing staff, who verbalizes understanding and is very happy and satisfied with the plan. Patient has been advised to continue all medications as prescribed and advised, and f/u with PCP within 1 week. Patient is stable from medical standpoint to be discharged. Total time spent during patient evaluation and assessment, discussion with the nurse/family, addressing discharge medications/scripts and coordination of care for safe discharge was in excess of 35 minutes.       Signed Electronically:    Chuck Forrest MD  2:25 PM 7/6/2020

## 2020-07-06 NOTE — PLAN OF CARE
Problem: Falls - Risk of:  Goal: Will remain free from falls  Description: Will remain free from falls  Outcome: Ongoing  Goal: Absence of physical injury  Description: Absence of physical injury  Outcome: Ongoing     Problem: Skin Integrity:  Goal: Will show no infection signs and symptoms  Description: Will show no infection signs and symptoms  Outcome: Ongoing  Goal: Absence of new skin breakdown  Description: Absence of new skin breakdown  Outcome: Ongoing     Problem: Confusion - Acute:  Goal: Absence of continued neurological deterioration signs and symptoms  Description: Absence of continued neurological deterioration signs and symptoms  Outcome: Ongoing  Goal: Mental status will be restored to baseline  Description: Mental status will be restored to baseline  Outcome: Ongoing     Problem: Discharge Planning:  Goal: Ability to perform activities of daily living will improve  Description: Ability to perform activities of daily living will improve  Outcome: Ongoing  Goal: Participates in care planning  Description: Participates in care planning  Outcome: Ongoing     Problem: Injury - Risk of, Physical Injury:  Goal: Will remain free from falls  Description: Will remain free from falls  Outcome: Ongoing  Goal: Absence of physical injury  Description: Absence of physical injury  Outcome: Ongoing     Problem: Mood - Altered:  Goal: Mood stable  Description: Mood stable  Outcome: Ongoing  Goal: Absence of abusive behavior  Description: Absence of abusive behavior  Outcome: Ongoing  Goal: Verbalizations of feeling emotionally comfortable while being cared for will increase  Description: Verbalizations of feeling emotionally comfortable while being cared for will increase  Outcome: Ongoing     Problem: Psychomotor Activity - Altered:  Goal: Absence of psychomotor disturbance signs and symptoms  Description: Absence of psychomotor disturbance signs and symptoms  Outcome: Ongoing     Problem: Sensory Perception - Impaired:  Goal: Demonstrations of improved sensory functioning will increase  Description: Demonstrations of improved sensory functioning will increase  Outcome: Ongoing  Goal: Decrease in sensory misperception frequency  Description: Decrease in sensory misperception frequency  Outcome: Ongoing  Goal: Able to refrain from responding to false sensory perceptions  Description: Able to refrain from responding to false sensory perceptions  Outcome: Ongoing  Goal: Demonstrates accurate environmental perceptions  Description: Demonstrates accurate environmental perceptions  Outcome: Ongoing  Goal: Able to distinguish between reality-based and nonreality-based thinking  Description: Able to distinguish between reality-based and nonreality-based thinking  Outcome: Ongoing  Goal: Able to interrupt nonreality-based thinking  Description: Able to interrupt nonreality-based thinking  Outcome: Ongoing     Problem: Sleep Pattern Disturbance:  Goal: Appears well-rested  Description: Appears well-rested  Outcome: Ongoing     Problem: Pain:  Description: Pain management should include both nonpharmacologic and pharmacologic interventions.   Goal: Pain level will decrease  Description: Pain level will decrease  Outcome: Ongoing  Goal: Control of acute pain  Description: Control of acute pain  Outcome: Ongoing  Goal: Control of chronic pain  Description: Control of chronic pain  Outcome: Ongoing

## 2020-07-06 NOTE — CARE COORDINATION
[facility]20: [facility] will need rapid cov[facility] test to return today.   Royal   F  Electronically signed by MARLYS Neff on 7/6/2020 at 11:17 AM

## 2020-07-07 VITALS
TEMPERATURE: 97.9 F | HEART RATE: 61 BPM | DIASTOLIC BLOOD PRESSURE: 72 MMHG | RESPIRATION RATE: 18 BRPM | OXYGEN SATURATION: 96 % | SYSTOLIC BLOOD PRESSURE: 118 MMHG

## 2020-07-07 LAB
ANION GAP SERPL CALCULATED.3IONS-SCNC: 10 MMOL/L (ref 7–19)
BUN BLDV-MCNC: 13 MG/DL (ref 8–23)
CALCIUM SERPL-MCNC: 9 MG/DL (ref 8.8–10.2)
CHLORIDE BLD-SCNC: 110 MMOL/L (ref 98–111)
CO2: 24 MMOL/L (ref 22–29)
CREAT SERPL-MCNC: 0.8 MG/DL (ref 0.5–1.2)
GFR NON-AFRICAN AMERICAN: >60
GLUCOSE BLD-MCNC: 92 MG/DL (ref 74–109)
HCT VFR BLD CALC: 37.9 % (ref 42–52)
HEMOGLOBIN: 12.4 G/DL (ref 14–18)
MCH RBC QN AUTO: 30.3 PG (ref 27–31)
MCHC RBC AUTO-ENTMCNC: 32.7 G/DL (ref 33–37)
MCV RBC AUTO: 92.7 FL (ref 80–94)
PDW BLD-RTO: 13 % (ref 11.5–14.5)
PLATELET # BLD: 148 K/UL (ref 130–400)
PMV BLD AUTO: 10.5 FL (ref 9.4–12.4)
POTASSIUM SERPL-SCNC: 4.5 MMOL/L (ref 3.5–5)
RBC # BLD: 4.09 M/UL (ref 4.7–6.1)
SARS-COV-2, PCR: NOT DETECTED
SODIUM BLD-SCNC: 144 MMOL/L (ref 136–145)
WBC # BLD: 5.6 K/UL (ref 4.8–10.8)

## 2020-07-07 PROCEDURE — 36415 COLL VENOUS BLD VENIPUNCTURE: CPT

## 2020-07-07 PROCEDURE — 80048 BASIC METABOLIC PNL TOTAL CA: CPT

## 2020-07-07 PROCEDURE — 97129 THER IVNTJ 1ST 15 MIN: CPT

## 2020-07-07 PROCEDURE — 6370000000 HC RX 637 (ALT 250 FOR IP): Performed by: HOSPITALIST

## 2020-07-07 PROCEDURE — 99232 SBSQ HOSP IP/OBS MODERATE 35: CPT | Performed by: PSYCHIATRY & NEUROLOGY

## 2020-07-07 PROCEDURE — 6370000000 HC RX 637 (ALT 250 FOR IP): Performed by: PSYCHIATRY & NEUROLOGY

## 2020-07-07 PROCEDURE — 85027 COMPLETE CBC AUTOMATED: CPT

## 2020-07-07 PROCEDURE — 92526 ORAL FUNCTION THERAPY: CPT

## 2020-07-07 RX ADMIN — LAMOTRIGINE 100 MG: 100 TABLET ORAL at 08:35

## 2020-07-07 RX ADMIN — DONEPEZIL HYDROCHLORIDE 10 MG: 5 TABLET, FILM COATED ORAL at 08:36

## 2020-07-07 RX ADMIN — QUETIAPINE FUMARATE 100 MG: 100 TABLET ORAL at 08:35

## 2020-07-07 RX ADMIN — PRAMIPEXOLE DIHYDROCHLORIDE 1 MG: 1 TABLET ORAL at 08:36

## 2020-07-07 RX ADMIN — GABAPENTIN 300 MG: 300 CAPSULE ORAL at 08:35

## 2020-07-07 RX ADMIN — ACETAMINOPHEN 1000 MG: 325 TABLET, FILM COATED ORAL at 06:38

## 2020-07-07 ASSESSMENT — PAIN SCALES - GENERAL: PAINLEVEL_OUTOF10: 4

## 2020-07-07 NOTE — PROGRESS NOTES
Patient:   Flori Bro  MR#:    [id number]   Room:    18/3646   YOB: 1950  Date of Progress Note: 7/7/2020  Time of Note                           8:44 AM  Consulting Physician:   Ankit Alarcon M.D. Attending Physician:  Janet Geronimo MD     CHIEF COMPLAINT: Agitation, confusion, abnormal CT of the head  ? Subjective  This 71 y.o. male who came in through the ED on 7/2 on transfer from a regional nursing home because of agitation and confusion. Since being here he is mildly confused but primarily agitated. Has a history of Parkinson's disease with secondary dementia and prominent resting tremor. He is typically followed at the [facility] in [place]. Currently takes a combination of Seroquel, gabapentin, Aricept and Mirapex. Apparently has been on Nuedexta , namenda and neuplazid in the past, according to the records. Admission blood work and urinalysis unremarkable. CT scan of the head films are reviewed. There is deep and cortical atrophy although the radiologist read it as ventricular enlargement somewhat out of proportion to the degree of atrophy. Patient's mental status changes were relatively sudden. No new problems evident. Awaiting discharge  REVIEW OF SYSTEMS:  Constitutional: No fevers No chills  Neck:No stiffness  Respiratory: No shortness of breath  Cardiovascular: No chest pain No palpitations  Gastrointestinal: No abdominal pain    Genitourinary: No Dysuria  Neurological: No headache, no confusion      PHYSICAL EXAM:  /75   Pulse 56   Temp 97.6 °F (36.4 °C)   Resp 16   SpO2 95%     Constitutional: he appears well-developed and well-nourished. Eyes - conjunctiva normal.  Pupils react to light  Ear, nose, throat -hearing intact to voice.  No scars, masses, or lesions over external nose or ears, no atrophy of tongue  Neck-symmetric, no masses noted, no jugular vein distension  Respiration- chest wall appears symmetric, good expansion,   normal obvious source for encephalopathy but could just be related to underlying dementia and lack of sleep. Continue trazodone/melatonin at nighttime to help improve this. No obvious focal signs or symptoms to suggest stroke or seizure. No obvious infection. Continue baseline medications  ? 2. Abnormal CT of the head. [facility] is not felt to be very likely at all. ?3.  History of bipolar disorder. Continue Lamictal  No further neurological recommendations currently. Discharge okay with me.   Please contact if needed

## 2020-07-07 NOTE — PROGRESS NOTES
[place], [place], [address]        DEPARTMENT OF HOSPITALIST MEDICINE        PROGRESS  NOTE:    NOTE: Portions of this note have been copied forward, however, changed to reflect the most current clinical status of this patient. Patient:  Chaka Santiago  YOB: 1950  Date of Service: 7/6/2020  MRN: [id number]   Acct: [de-identified]   Primary Care Physician: Naomi Rocha  Advance Directive: Full Code  Admit Date: 7/2/2020       Hospital Day: 4      CHIEF COMPLAINT:  Chief Complaint   Patient presents with    Altered Mental Status     [facility] with reports of change in mental status noted this am       SUBJECTIVE / [address]:    7/6/2020  Patient feels better. His hematemesis improved. He has been cleared by neurology for discharge. I ordered COVID-19 testing for transfer back to facility which is pending. I will advance him to full diet. Case management has arranged transfer back to his skilled nursing facility once COVID-19 test is back. 7/5/2020  Patient is clinically and symptomatically improved. His hand tremors are the best I have seen over the last few days. He was seen by psychiatrist who recommended some med changes. He does not meet criteria for inpatient psych admission, and psychiatry recommended skilled nursing facility placement for him. 7/4/2020  Patient feels a little better today. He had a good night's sleep yesterday and feels relaxed. He is more communicative and caring longer conversation. He still has pill-rolling movements of his hands, right more than left when he gets anxious. Neurology has advised to continue with Lamictal.  We are awaiting psychiatric consult. 7/3/2020  I saw and evaluated patient at the bedside today. He was having tremors of his hands likely secondary to Parkinson's disease. Neurology evaluated the patient and ruled out normal pressure hydrocephalus.   They are giving him combination of medications to help him sleep tonight. I am going to order psychiatric evaluation as well PT/OT and case management consult for DC planning. 7/2/2020  HISTORY OF PRESENT ILLNESS:   This 71 y.o. male who came in through the ED on 7/2 on transfer from a regional nursing home because of agitation and confusion. Since being here he is mildly confused but primarily agitated. Has a history of Parkinson's disease with secondary dementia and prominent resting tremor. He is typically followed at the [facility] clinic in [place]. Currently takes a combination of Seroquel, gabapentin, Aricept and Mirapex. Apparently has been on Nuedexta , namenda and neuplazid in the past, according to the records. Admission blood work and urinalysis unremarkable. CT scan of the head films are reviewed. There is deep and cortical atrophy although the radiologist read it as ventricular enlargement somewhat out of proportion to the degree of atrophy. Patient's mental status changes were relatively sudden. Also, noteworthy is the fact that he had no sleep last night and or the night before as best we can tell.       REVIEW  OF  SYSTEMS:    Unable to be obtained . .. Patient is pleasantly confused! PAST MEDICAL HISTORY:  Past Medical History:   Diagnosis Date    Anxiety     Bipolar 1 disorder ([facility] [facility]     Dementia ([facility] [facility]     Parkinson disease ([facility] [address]          PAST SURGICAL HISTORY:  History reviewed. No pertinent surgical history.      FAMILY HISTORY:  Family History   Family history unknown: Yes           OBJECTIVE:  /74   Pulse 67   Temp 98.7 °F (37.1 °C) (Temporal)   Resp 16   SpO2 96%   I/O this shift:  In: 240 [P.O.:240]  Out: -     PHYSICAL  EXAMINATION:    ZOË:   Patient is pleasantly confused, appears tired and fatigued   Head/Eyes:  Normocephalic, atraumatic, EOMI and PERRLA bilaterally   Respiratory:   Bilateral decreased air entry in both lung fields, mild B/L crackles, symmetric expansion of chest   Cardiovascular:  Regular rate and rhythm, S1+S2+0, no murmurs/rubs   Abdomen:   Soft, non-tender, bowel sounds +ve, no organomegaly   Extremities: Moves all, decreased range of motion, no edema   Neurologic:  Patient is pleasantly confused, cranial nerves II-XII intact, patient has hand tremors (right more than left)   Psychiatric:  Unable to be obtained . .. Patient is pleasantly confused! CURRENT MEDICATIONS:  Scheduled:   lamoTRIgine  100 mg Oral Daily    lamoTRIgine  150 mg Oral Nightly    citalopram  20 mg Oral Nightly    donepezil  10 mg Oral BID    gabapentin  300 mg Oral BID    QUEtiapine  100 mg Oral TID    pramipexole  1 mg Oral TID    melatonin  9 mg Oral Nightly    traZODone  150 mg Oral Nightly    acetaminophen  1,000 mg Oral 3 times per day    sodium chloride flush  10 mL Intravenous 2 times per day    enoxaparin  40 mg Subcutaneous Q24H        PRN:  sodium chloride flush, 10 mL, PRN  acetaminophen, 650 mg, Q6H PRN    Or  acetaminophen, 650 mg, Q6H PRN  magnesium hydroxide, 30 mL, Daily PRN  promethazine, 12.5 mg, Q6H PRN    Or  ondansetron, 4 mg, Q6H PRN        Infusions:      Laboratory Data:  Recent Labs     07/04/20 0138 07/05/20  0144 07/06/20  0132   WBC 5.2 5.2 4.7*   HGB 12.3* 12.0* 11.7*    154 143     Recent Labs     07/04/20 0138 07/05/20  0144 07/06/20  0132    146* 142   K 4.2 4.3 3.3*    109 110   CO2 24 23 22   BUN 17 18 15   CREATININE 0.7 0.7 0.7   GLUCOSE 99 114* 118*     No results for input(s): AST, ALT, ALB, BILITOT, ALKPHOS in the last 72 hours. Troponin T: No results for input(s): TROPONINI in the last 72 hours. Pro-BNP: No results for input(s): BNP in the last 72 hours. INR: No results for input(s): INR in the last 72 hours.   UA:  No results for input(s): NITRITE, COLORU, PHUR, LABCAST, WBCUA, RBCUA, MUCUS, TRICHOMONAS, YEAST, BACTERIA, CLARITYU, SPECGRAV, LEUKOCYTESUR, UROBILINOGEN, BILIRUBINUR, BLOODU, GLUCOSEU, AMORPHOUS in the last 72 hours. Invalid input(s): [facility]  A1C: No results for input(s): LABA1C in the last 72 hours. ABG:No results for input(s): PHART, HWO4UKW, PO2ART, KDL2TCQ, BEART, HGBAE, H6UYAZDJ, CARBOXHGBART in the last 72 hours. Imaging:    Ct Head Wo Contrast    Result Date: 7/2/2020  1. Ventriculomegaly out of proportion to the degree of atrophy raising the differential of normal pressure hydrocephalus. The brain is otherwise unremarkable except for a empty sella. 2. Mucous retention cyst or polyp within the right maxillary sinus. Signed by Dr Bebeto Stephens on 7/2/2020 7:29 PM       ASSESSMENT & PLAN:    Active Problems:    Parkinson disease ([address]    Alzheimer's dementia with behavioral disturbance ([address]    Impaired ambulation    Bipolar disorder ([address]    Hypokalemia  Resolved Problems:    Metabolic encephalopathy    Continue with current medications  DC one-to-one sitter  Psychiatry consult reviewed and appreciated  Patient is having good night sleep after having a combination of melatonin and trazodone   Follow-up closely as per neurology and psychiatry as an outpatient  Continue with PT/OT   COVID-19 testing ordered which is pending   Continue with dysphagia soft and bite size diet  DC IV fluids  Potassium supplementation ordered for hypokalemia  Repeat BMP in a.m.  DC planning back to his nursing facility in the morning      Repeat labs in a.m. Electrolyte replacement as per protocol. Patient will be monitored very closely on the floor. Further recommendations as per the hospital course. Discharge Plan:  To skilled nursing facility in a.m. once arranged by case management    Patient  is on DVT prophylaxis  Current medications reviewed  Lab work reviewed  Radiology/Chest x-ray films reviewed  Treatment recommendations from suspecialities reviewed, appreciated and agreed with  Discussed with the nurse and addressed all questions/concerns      Florentino Pineda MD  7/6/2020 7:13 PM      DISCLAIMER: This note was created with electronic voice recognition which does have occasional errors. If you have any questions regarding the content within the note please do not hesitate to contact me. .. Thanks.

## 2020-07-07 NOTE — PLAN OF CARE
Problem: Falls - Risk of:  Goal: Will remain free from falls  Description: Will remain free from falls  Outcome: Ongoing  Goal: Absence of physical injury  Description: Absence of physical injury  Outcome: Ongoing     Problem: Skin Integrity:  Goal: Will show no infection signs and symptoms  Description: Will show no infection signs and symptoms  Outcome: Ongoing  Goal: Absence of new skin breakdown  Description: Absence of new skin breakdown  Outcome: Ongoing     Problem: Confusion - Acute:  Goal: Absence of continued neurological deterioration signs and symptoms  Description: Absence of continued neurological deterioration signs and symptoms  Outcome: Ongoing  Goal: Mental status will be restored to baseline  Description: Mental status will be restored to baseline  Outcome: Ongoing     Problem: Discharge Planning:  Goal: Ability to perform activities of daily living will improve  Description: Ability to perform activities of daily living will improve  Outcome: Ongoing  Goal: Participates in care planning  Description: Participates in care planning  Outcome: Ongoing     Problem: Injury - Risk of, Physical Injury:  Goal: Will remain free from falls  Description: Will remain free from falls  Outcome: Ongoing  Goal: Absence of physical injury  Description: Absence of physical injury  Outcome: Ongoing     Problem: Mood - Altered:  Goal: Mood stable  Description: Mood stable  Outcome: Ongoing  Goal: Absence of abusive behavior  Description: Absence of abusive behavior  Outcome: Ongoing  Goal: Verbalizations of feeling emotionally comfortable while being cared for will increase  Description: Verbalizations of feeling emotionally comfortable while being cared for will increase  Outcome: Ongoing     Problem: Psychomotor Activity - Altered:  Goal: Absence of psychomotor disturbance signs and symptoms  Description: Absence of psychomotor disturbance signs and symptoms  Outcome: Ongoing     Problem: Sensory Perception - Impaired:  Goal: Demonstrations of improved sensory functioning will increase  Description: Demonstrations of improved sensory functioning will increase  Outcome: Ongoing  Goal: Decrease in sensory misperception frequency  Description: Decrease in sensory misperception frequency  Outcome: Ongoing  Goal: Able to refrain from responding to false sensory perceptions  Description: Able to refrain from responding to false sensory perceptions  Outcome: Ongoing  Goal: Demonstrates accurate environmental perceptions  Description: Demonstrates accurate environmental perceptions  Outcome: Ongoing  Goal: Able to distinguish between reality-based and nonreality-based thinking  Description: Able to distinguish between reality-based and nonreality-based thinking  Outcome: Ongoing  Goal: Able to interrupt nonreality-based thinking  Description: Able to interrupt nonreality-based thinking  Outcome: Ongoing     Problem: Sleep Pattern Disturbance:  Goal: Appears well-rested  Description: Appears well-rested  Outcome: Ongoing     Problem: Pain:  Goal: Pain level will decrease  Description: Pain level will decrease  Outcome: Ongoing  Goal: Control of acute pain  Description: Control of acute pain  Outcome: Ongoing  Goal: Control of chronic pain  Description: Control of chronic pain  Outcome: Ongoing

## 2020-07-07 NOTE — PROGRESS NOTES
observed, patient demonstrated ability to follow simple 1 step commands independently. Delays were noted and break down was observed during yes/no questions of increased complexity. While delays were noted, patient demonstrated ability to follow simple 2 step commands at independent level.)     Expression  Primary Mode of Expression:  (Confrontation naming of items was considered to be delayed but appropriate. Structured responsive speech and responses in natural conversation were considered to be impaired with inconsistent instances where utterances did not pertain to topics and questions at hand.)     Motor Speech:  (SLP ranked functional intelligibility of speech for unfamiliar listeners at 100% in conversation.)     Overall Orientation Status:  (Patient demonstrated ability to verbalize name and birthday at independent level. Patient did not verbalize age, address, phone number, city, state, hospital, month, JUNO, date, or year independently.)     Attention:  (Patient demonstrated decreased select and sustained attention during treatment session; no adverse behaviors were noted with provision of redirections.)    Memory:  (Patient demonstrated decreased immediate memory with sequences of unrelated numbers/words set up to 3 items without repetitions provided. Patient demonstrated decreased short-term/working memory with less than 2 minute delay+distractions present during treatment session.)     Additional Assessments  Re-assessed patient's swallowing function. Patient exhibited decreased rotary jaw movement during oral prep of regular solid consistency trials presented independently. Oral transit of regular solid consistency primarily measured 1-2 seconds in length and min oral cavity residue was noted post swallows; residue cleared from the mouth with additional dry swallows.  Oral transit of thin liquid presentations, administered independently via cup, primarily measured 1-2 seconds in length. Laryngeal elevation during swallow initiation was considered to be sluggish and inconsistently mildly decreased for swallow airway protection. Even so, no outward S/S penetration/aspiration was observed with any regular solid consistency trial or thin liquid presentation administered during treatment session this date.     At this time, upon D/C from acute hospital setting, would recommend diet upgrade to regular solid consistency. Continue thin liquids. Recommend meds in pudding/applesauce.     Electronically signed by JAMSHID Sweet on 7/7/2020 at 11:12 AM

## 2020-07-07 NOTE — DISCHARGE SUMMARY
[place], [place], [address]        DEPARTMENT OF HOSPITALIST MEDICINE        DISCHARGE SUMMARY:      PATIENT NAME:  Tammy Tobias  :  1950  MRN:  [id number]    Admission Date:   2020  5:13 PM Attending: Gerardo Ruth MD   Discharge Date:   2020              PCP: Vik Guzman  Length of Stay: 5 days     Chief Complaint on Admission:   Chief Complaint   Patient presents with    Altered Mental Status     arrived via EMS from [facility] with reports of change in mental status noted this am       Consultants:     IP CONSULT TO [address] TO CASE MANAGEMENT       Discharge Problem List:   Active Problems:    Parkinson disease ([address]    Alzheimer's dementia with behavioral disturbance ([facility]    Impaired ambulation    Bipolar disorder ([facility]    Hypokalemia  Resolved Problems:    Metabolic encephalopathy       [address]  AND  TREATMENT:    2020  He is doing well. His hand tremors have improved. He is ambulating and wants to be discharged. His COVID-19 test back came to be  normal.  He is medically stable to be discharged to skilled nursing facility. 2020  Patient feels better. His hand tremors have improved. He has been cleared by neurology for discharge. I ordered COVID-19 testing for transfer back to facility which is pending. I will advance him to full diet. Case management has arranged transfer back to his skilled nursing facility once COVID-19 test is back. NEUROLOGY . .. IMPRESSION . .. 2020:     1.  Parkinson's disease with dementia.    He is better today. No obvious source for encephalopathy but could just be related to underlying dementia and lack of sleep.   Continue trazodone/melatonin at nighttime to help improve this. No obvious focal signs or symptoms to suggest stroke or seizure.  No obvious infection.  Continue baseline medications  ? 2.  Abnormal CT of the head.  [address] [place] [address] is not felt to be very likely at all.  ?3.  History of bipolar disorder.  Continue Lamictal  No further neurological recommendations currently. Discharge okay with me. Please contact if needed    Jackelyn Akbar MD   Physician   Neurology   Progress Notes   Signed   Date of Service:  7/6/2020  7:34 AM     ----------------------------------------------------------------------------------------------------------------------    PSYCHIATRY . .. Assessment . .. 7/4/2020:  DSM 5 DIAGNOSIS:  Major neurocognitive disorder, moderate  Parkinson disease  Bipolar disorder     Recommendations  1. Currently patient is not suicidal, homicidal or psychotic. Patient does not meet criteria for psychiatric hospitalization. 2.  Patient experience episodes of agitation, which could be related to progression of Alzheimer's disorder or secondary to bipolar disorder. 3.  I would recommend to increase dose of the Lamictal from 100 mg twice a day to 100 mg in the morning and 150 mg in the evening for mood stabilization. However, patient was informed that it would be no immediate effect of medication, and it could take up to 7-14 days until effect of medication could be evaluated. 4.  Patient has been prescribed Xanax previously, which primary treatment team put on hold. I would not recommend to restart medication, prescribed any other benzodiazepines, due to possible paradoxical effect of benzodiazepines in elderly population, especially in people with dementia. 5.  Seroquel is already prescribed at higher dose, then I would consider to prescribe to patient with dementia to manage aggression and agitation, mostly due to black box warning of antipsychotic medications, secondary to increased risk of the death, when they prescribed for patients with neurocognitive disorder. 6.  If patient would continue to have episodes of anxiety, agitation and aggression, I would recommend to discontinue donepezil, due to possible cholinergic effect of medication.     7.  Primary treatment team needs to consider patient's placement to a nursing home due to severity of the patient's dementia. 8.  Patient's nurse addressed family question about prescribed to patient deutetrabenazine Claire Suadme). This medication is usually prescribed for patient with chorea and [place] disease or tardive dyskinesia, secondary to side effect of 1st generation antipsychotic medications. At this point, I do not know real indications for prescribing this medication for our patient, and I would recommend to address this question to the physician, which has prescribed this medication to patient in nursing home.        Skylar Holm MD    7/5/2020  Patient is clinically and symptomatically improved. His hand tremors are the best I have seen over the last few days. He was seen by psychiatrist who recommended some med changes. He does not meet criteria for inpatient psych admission, and psychiatry recommended skilled nursing facility placement for him.     7/4/2020  Patient feels a little better today. He had a good night's sleep yesterday and feels relaxed. He is more communicative and caring longer conversation. He still has pill-rolling movements of his hands, right more than left when he gets anxious. Neurology has advised to continue with Lamictal.  We are awaiting psychiatric consult.     7/3/2020  I saw and evaluated patient at the bedside today. He was having tremors of his hands likely secondary to Parkinson's disease. Neurology evaluated the patient and ruled out normal pressure hydrocephalus. They are giving him combination of medications to help him sleep tonight. I am going to order psychiatric evaluation as well PT/OT and case management consult for DC planning.     7/2/2020  HISTORY OF PRESENT ILLNESS:   B[age] Manc[age] y. o. male who came in through the ED on 7/2 on transfer from a regional nursing home because of agitation and confusion.  Since being here he is mildly confused but primarily agitated.  Has a history of Parkinson's disease with secondary dementia and prominent resting tremor.  He is typically followed at the [facility] clinic in [facility]. Maryam Valverde takes a combination of Seroquel, gabapentin, Aricept and Mirapex.  Apparently has been on Nuedexta , namenda and neuplazid in the past, according to the records. Nevelyn Amguillermo blood work and urinalysis unremarkable.  CT scan of the head films are reviewed. Stefany Leigh is deep and cortical atrophy although the radiologist read it as ventricular enlargement somewhat out of proportion to the degree of atrophy.  Patient's mental status changes were relatively sudden.  Also, noteworthy is the fact that he had no sleep last night and or the night before as best we can tell.       OBJECTIVE:  /72   Pulse 61   Temp 97.9 °F (36.6 °C)   Resp 18   SpO2 96%       Heart: RRR   Lungs: Clear   Abdomen: Soft, non-tender   Extremities: No edema   Neurologic: Alert and oriented   Skin: Warm and dry          Laboratory Data:  Recent Labs     07/05/20  0144 07/06/20  0132 07/07/20 0135   WBC 5.2 4.7* 5.6   HGB 12.0* 11.7* 12.4*    143 148     Recent Labs     07/05/20  0144 07/06/20  0132 07/07/20 0135   * 142 144   K 4.3 3.3* 4.5    110 110   CO2 23 22 24   BUN 18 15 13   CREATININE 0.7 0.7 0.8   GLUCOSE 114* 118* 92     No results for input(s): AST, ALT, ALB, BILITOT, ALKPHOS in the last 72 hours. Troponin T: No results for input(s): TROPONINI in the last 72 hours. Pro-BNP: No results for input(s): BNP in the last 72 hours. INR: No results for input(s): INR in the last 72 hours. UA:No results for input(s): NITRITE, COLORU, PHUR, LABCAST, WBCUA, RBCUA, MUCUS, TRICHOMONAS, YEAST, BACTERIA, CLARITYU, SPECGRAV, LEUKOCYTESUR, UROBILINOGEN, BILIRUBINUR, BLOODU, GLUCOSEU, AMORPHOUS in the last 72 hours. Invalid input(s): [facility]  A1C: No results for input(s): LABA1C in the last 72 hours.   ABG:No results for input(s): PHART, QGL6YKE, PO2ART, WKD8GZA, BEART, HGBAE, E7XMUXQQ, CARBOXHGBART in the last 72 hours. Impressions of imaging performed in 48 hours before discharge:    No results found. Ofe Galeas   Home Medication Instructions QDP:[id number]    Printed on:07/07/20 1217   Medication Information                      ALPRAZolam (XANAX) 0.25 MG tablet  Take 1 tablet by mouth 2 times daily as needed for Anxiety for up to 3 days. citalopram (CELEXA) 20 MG tablet  Take 20 mg by mouth nightly             Deutetrabenazine 12 MG TABS  Take 12 mg by mouth 2 times daily             donepezil (ARICEPT) 10 MG tablet  Take 10 mg by mouth 2 times daily             fluconazole (DIFLUCAN) 150 MG tablet  Take 150 mg by mouth once a week             gabapentin (NEURONTIN) 300 MG capsule  Take 300 mg by mouth 2 times daily. hydrOXYzine (ATARAX) 25 MG tablet  Take 25 mg by mouth nightly             lamoTRIgine (LAMICTAL) 100 MG tablet  Take 100 mg by mouth 2 times daily             medroxyPROGESTERone (PROVERA) 10 MG tablet  Take 10 mg by mouth 2 times daily             melatonin 3 MG TABS tablet  Take 3 tablets by mouth nightly             pramipexole (MIRAPEX) 1 MG tablet  Take 1 mg by mouth 3 times daily             QUEtiapine (SEROQUEL) 100 MG tablet  Take 100 mg by mouth 3 times daily             traMADol (ULTRAM) 50 MG tablet  Take 1 tablet by mouth every 6 hours as needed for Pain for up to 3 days. traZODone (DESYREL) 150 MG tablet  Take 1 tablet by mouth nightly                   ISSUES TO BE ADDRESSED AT HOSPITAL FOLLOW-UP VISIT:  Patient will need close follow-up with PCP, neurology and psychiatry as an outpatient. Condition on Discharge: gradually improving  Discharge Disposition: Skilled Facility    Recommended Follow Up:  [address] [facility]          Followup Appointments Scheduled at Time of Discharge:  No future appointments. Discharge Instructions:   Please see the discharge paperwork. Patient was seen at bedside today, and the examination shows improvement since yesterday. Detailed discharge directions delivered to the patient by myself and our nursing staff, who verbalizes understanding and is very happy and satisfied with the plan. Patient has been advised to continue all medications as prescribed and advised, and f/u with PCP within 1 week. Patient is stable from medical standpoint to be discharged. Total time spent during patient evaluation and assessment, discussion with the nurse/family, addressing discharge medications/scripts and coordination of care for safe discharge was in excess of 35 minutes.       Signed Electronically:    Linda Nolan MD  12:14 PM 7/7/2020

## 2020-07-07 NOTE — CARE COORDINATION
SW placed call to [facility] [phone] to arrange hospital discharge ride to [facility]; scheduled pick-up for [facility] entrance at 1:30PM; they will call [phone] when they arrive; conf #[id number]    Electronically signed by Francisco [facility] on 7/7/2020 at 12:29 PM

## 2020-07-07 NOTE — PROGRESS NOTES
Pts bed alarm went off. Pt was sitting on side of bed. I asked him what he was doing and he said \" I'm getting ready for my trip. \" I asked what trip. He stated, \"The trip we are going on of course. \" I informed him that he was still in the hospital and that we needed him to stay in bed. HE refused and said he needed to get his stuff ready. I convinced him to sit in the chair but he refused to allow me to connect him to a chair alarm.    Electronically signed by Citlaly Callahan RN on 7/7/2020 at 6:46 AM

## 2020-07-08 LAB — BLOOD CULTURE, ROUTINE: NORMAL

## 2020-07-14 ENCOUNTER — HOSPITAL ENCOUNTER (EMERGENCY)
Age: 70
Discharge: HOME | End: 2020-07-14
Attending: EMERGENCY MEDICINE
Payer: MEDICARE

## 2020-07-14 VITALS
TEMPERATURE: 98.2 F | HEART RATE: 64 BPM | SYSTOLIC BLOOD PRESSURE: 124 MMHG | OXYGEN SATURATION: 97 % | DIASTOLIC BLOOD PRESSURE: 56 MMHG | RESPIRATION RATE: 16 BRPM

## 2020-07-14 LAB
ALBUMIN SERPL-MCNC: 4 G/DL (ref 3.5–5.2)
ALP BLD-CCNC: 66 U/L (ref 40–130)
ALT SERPL-CCNC: 9 U/L (ref 5–41)
AMPHETAMINE SCREEN, URINE: NEGATIVE
ANION GAP SERPL CALCULATED.3IONS-SCNC: 9 MMOL/L (ref 7–19)
AST SERPL-CCNC: 14 U/L (ref 5–40)
BARBITURATE SCREEN URINE: NEGATIVE
BASOPHILS ABSOLUTE: 0.1 K/UL (ref 0–0.2)
BASOPHILS RELATIVE PERCENT: 0.8 % (ref 0–1)
BENZODIAZEPINE SCREEN, URINE: NEGATIVE
BILIRUB SERPL-MCNC: 0.8 MG/DL (ref 0.2–1.2)
BILIRUBIN URINE: NEGATIVE
BLOOD, URINE: NEGATIVE
BUN BLDV-MCNC: 13 MG/DL (ref 8–23)
CALCIUM SERPL-MCNC: 9.1 MG/DL (ref 8.8–10.2)
CANNABINOID SCREEN URINE: NEGATIVE
CHLORIDE BLD-SCNC: 106 MMOL/L (ref 98–111)
CLARITY: CLEAR
CO2: 28 MMOL/L (ref 22–29)
COCAINE METABOLITE SCREEN URINE: NEGATIVE
COLOR: YELLOW
CREAT SERPL-MCNC: 0.7 MG/DL (ref 0.5–1.2)
EOSINOPHILS ABSOLUTE: 0.2 K/UL (ref 0–0.6)
EOSINOPHILS RELATIVE PERCENT: 2.5 % (ref 0–5)
ETHANOL: <10 MG/DL (ref 0–0.08)
GFR AFRICAN AMERICAN: >59
GFR NON-AFRICAN AMERICAN: >60
GLUCOSE BLD-MCNC: 99 MG/DL (ref 74–109)
GLUCOSE URINE: NEGATIVE MG/DL
HCT VFR BLD CALC: 40.4 % (ref 42–52)
HEMOGLOBIN: 12.9 G/DL (ref 14–18)
IMMATURE GRANULOCYTES #: 0 K/UL
KETONES, URINE: NEGATIVE MG/DL
LEUKOCYTE ESTERASE, URINE: NEGATIVE
LYMPHOCYTES ABSOLUTE: 1.4 K/UL (ref 1.1–4.5)
LYMPHOCYTES RELATIVE PERCENT: 21.9 % (ref 20–40)
Lab: NORMAL
MCH RBC QN AUTO: 29.7 PG (ref 27–31)
MCHC RBC AUTO-ENTMCNC: 31.9 G/DL (ref 33–37)
MCV RBC AUTO: 93.1 FL (ref 80–94)
MONOCYTES ABSOLUTE: 0.5 K/UL (ref 0–0.9)
MONOCYTES RELATIVE PERCENT: 7.1 % (ref 0–10)
NEUTROPHILS ABSOLUTE: 4.3 K/UL (ref 1.5–7.5)
NEUTROPHILS RELATIVE PERCENT: 67.4 % (ref 50–65)
NITRITE, URINE: NEGATIVE
OPIATE SCREEN URINE: NEGATIVE
PDW BLD-RTO: 13.5 % (ref 11.5–14.5)
PH UA: 7 (ref 5–8)
PLATELET # BLD: 178 K/UL (ref 130–400)
PMV BLD AUTO: 9.5 FL (ref 9.4–12.4)
POTASSIUM REFLEX MAGNESIUM: 4.3 MMOL/L (ref 3.5–5)
PROTEIN UA: NEGATIVE MG/DL
RBC # BLD: 4.34 M/UL (ref 4.7–6.1)
SODIUM BLD-SCNC: 143 MMOL/L (ref 136–145)
SPECIFIC GRAVITY UA: 1.01 (ref 1–1.03)
TOTAL PROTEIN: 6.1 G/DL (ref 6.6–8.7)
UROBILINOGEN, URINE: 1 E.U./DL
WBC # BLD: 6.4 K/UL (ref 4.8–10.8)

## 2020-07-14 PROCEDURE — 80307 DRUG TEST PRSMV CHEM ANLYZR: CPT

## 2020-07-14 PROCEDURE — 80053 COMPREHEN METABOLIC PANEL: CPT

## 2020-07-14 PROCEDURE — 99285 EMERGENCY DEPT VISIT HI MDM: CPT

## 2020-07-14 PROCEDURE — 36415 COLL VENOUS BLD VENIPUNCTURE: CPT

## 2020-07-14 PROCEDURE — 85025 COMPLETE CBC W/AUTO DIFF WBC: CPT

## 2020-07-14 PROCEDURE — 81003 URINALYSIS AUTO W/O SCOPE: CPT

## 2020-07-14 PROCEDURE — G0480 DRUG TEST DEF 1-7 CLASSES: HCPCS
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
## 2020-07-14 NOTE — ED NOTES
Bed: 16  Expected date:   Expected time:   Means of arrival:   Comments:  Mer Rodriguez, RN  07/14/20 5074
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
## 2020-07-15 NOTE — PROGRESS NOTES
Physician Progress Note      Naila Woods  CSN #:                  [id number]  :                       1950  ADMIT DATE:       2020 5:13 PM  [address] DATE:        2020 1:45 PM  RESPONDING  PROVIDER #:        Ricarod AGARWAL MD          QUERY TEXT:    Patient admitted with change in mental statusrelatively sudden from nursing   home . Noted documentation of Metabolic Encephalopathy in DC summary, H&P, and   progress notes. If possible, please document in progress notes and discharge   summary:    The medical record reflects the following:  Risk Factors: Advanced Age, Dementia, Alzheimers, Parkinson's  Clinical Indicators: verbally aggressive and combative for short time per   nurses, neuro says does not feel encephalopathy, psych feels r/t progression   of Alzheimers or bipolar disorder  Treatment: Increased Lamictal dose, Neuro consult, Psych consult, CT head  Options provided:  -- Metabolic Encephalopathy present as evidenced by, Please document evidence. -- Metabolic Encephalopathy was ruled out  -- Refer to Clinical Documentation Reviewer    PROVIDER RESPONSE TEXT:    *** is present as evidenced by Please see supporting documentation in the   chart.  Thanks    Query created by: Silvia Alanis on 7/15/2020 7:04 AM      Electronically signed by:  Maciel Cartwright MD 7/15/2020 9:19 AM

## 2020-08-13 ENCOUNTER — HOSPITAL ENCOUNTER (EMERGENCY)
Age: 70
Discharge: HOME | End: 2020-08-13
Attending: EMERGENCY MEDICINE
Payer: MEDICARE

## 2020-08-13 VITALS
HEIGHT: 67 IN | HEART RATE: 97 BPM | WEIGHT: 182 LBS | OXYGEN SATURATION: 95 % | RESPIRATION RATE: 17 BRPM | DIASTOLIC BLOOD PRESSURE: 69 MMHG | TEMPERATURE: 98.2 F | BODY MASS INDEX: 28.56 KG/M2 | SYSTOLIC BLOOD PRESSURE: 139 MMHG

## 2020-08-13 LAB
ALBUMIN SERPL-MCNC: 4.2 G/DL (ref 3.5–5.2)
ALP BLD-CCNC: 64 U/L (ref 40–130)
ALT SERPL-CCNC: 15 U/L (ref 5–41)
AMPHETAMINE SCREEN, URINE: NEGATIVE
ANION GAP SERPL CALCULATED.3IONS-SCNC: 9 MMOL/L (ref 7–19)
AST SERPL-CCNC: 27 U/L (ref 5–40)
BARBITURATE SCREEN URINE: NEGATIVE
BASOPHILS ABSOLUTE: 0.1 K/UL (ref 0–0.2)
BASOPHILS RELATIVE PERCENT: 0.9 % (ref 0–1)
BENZODIAZEPINE SCREEN, URINE: NEGATIVE
BILIRUB SERPL-MCNC: 0.7 MG/DL (ref 0.2–1.2)
BILIRUBIN URINE: NEGATIVE
BLOOD, URINE: NEGATIVE
BUN BLDV-MCNC: 19 MG/DL (ref 8–23)
CALCIUM SERPL-MCNC: 9 MG/DL (ref 8.8–10.2)
CANNABINOID SCREEN URINE: NEGATIVE
CHLORIDE BLD-SCNC: 107 MMOL/L (ref 98–111)
CLARITY: CLEAR
CO2: 27 MMOL/L (ref 22–29)
COCAINE METABOLITE SCREEN URINE: NEGATIVE
COLOR: YELLOW
CREAT SERPL-MCNC: 0.8 MG/DL (ref 0.5–1.2)
EOSINOPHILS ABSOLUTE: 0.1 K/UL (ref 0–0.6)
EOSINOPHILS RELATIVE PERCENT: 1.7 % (ref 0–5)
ETHANOL: <10 MG/DL (ref 0–0.08)
GFR AFRICAN AMERICAN: >59
GFR NON-AFRICAN AMERICAN: >60
GLUCOSE BLD-MCNC: 96 MG/DL (ref 74–109)
GLUCOSE URINE: NEGATIVE MG/DL
HCT VFR BLD CALC: 38.4 % (ref 42–52)
HEMOGLOBIN: 12.9 G/DL (ref 14–18)
IMMATURE GRANULOCYTES #: 0 K/UL
KETONES, URINE: NEGATIVE MG/DL
LEUKOCYTE ESTERASE, URINE: NEGATIVE
LYMPHOCYTES ABSOLUTE: 1.5 K/UL (ref 1.1–4.5)
LYMPHOCYTES RELATIVE PERCENT: 23 % (ref 20–40)
Lab: NORMAL
MCH RBC QN AUTO: 29.7 PG (ref 27–31)
MCHC RBC AUTO-ENTMCNC: 33.6 G/DL (ref 33–37)
MCV RBC AUTO: 88.5 FL (ref 80–94)
MONOCYTES ABSOLUTE: 0.4 K/UL (ref 0–0.9)
MONOCYTES RELATIVE PERCENT: 6.5 % (ref 0–10)
NEUTROPHILS ABSOLUTE: 4.3 K/UL (ref 1.5–7.5)
NEUTROPHILS RELATIVE PERCENT: 67.4 % (ref 50–65)
NITRITE, URINE: NEGATIVE
OPIATE SCREEN URINE: NEGATIVE
PDW BLD-RTO: 13 % (ref 11.5–14.5)
PH UA: 6 (ref 5–8)
PLATELET # BLD: 195 K/UL (ref 130–400)
PMV BLD AUTO: 9.8 FL (ref 9.4–12.4)
POTASSIUM REFLEX MAGNESIUM: 4 MMOL/L (ref 3.5–5)
PROTEIN UA: NEGATIVE MG/DL
RBC # BLD: 4.34 M/UL (ref 4.7–6.1)
SODIUM BLD-SCNC: 143 MMOL/L (ref 136–145)
SPECIFIC GRAVITY UA: 1.03 (ref 1–1.03)
TOTAL PROTEIN: 6.4 G/DL (ref 6.6–8.7)
UROBILINOGEN, URINE: 2 E.U./DL
WBC # BLD: 6.4 K/UL (ref 4.8–10.8)

## 2020-08-13 PROCEDURE — G0480 DRUG TEST DEF 1-7 CLASSES: HCPCS

## 2020-08-13 PROCEDURE — 81003 URINALYSIS AUTO W/O SCOPE: CPT

## 2020-08-13 PROCEDURE — 80053 COMPREHEN METABOLIC PANEL: CPT

## 2020-08-13 PROCEDURE — 80307 DRUG TEST PRSMV CHEM ANLYZR: CPT

## 2020-08-13 PROCEDURE — 36415 COLL VENOUS BLD VENIPUNCTURE: CPT

## 2020-08-13 PROCEDURE — 99283 EMERGENCY DEPT VISIT LOW MDM: CPT

## 2020-08-13 PROCEDURE — 85025 COMPLETE CBC W/AUTO DIFF WBC: CPT

## 2020-08-13 ASSESSMENT — LIFESTYLE VARIABLES: HISTORY_ALCOHOL_USE: NO

## 2020-08-14 NOTE — ED NOTES
Bed: 05  Expected date:   Expected time:   Means of arrival: [facility]  Comments:  EMS     Sharmila Murray RN  08/13/20 Dave Schreiber
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
## 2020-11-25 ENCOUNTER — APPOINTMENT (OUTPATIENT)
Dept: CT IMAGING | Age: 70
End: 2020-11-25
Payer: MEDICARE

## 2020-11-25 ENCOUNTER — HOSPITAL ENCOUNTER (EMERGENCY)
Age: 70
Discharge: HOME | End: 2020-11-26
Attending: PEDIATRICS
Payer: MEDICARE

## 2020-11-25 LAB
BASOPHILS ABSOLUTE: 0 K/UL (ref 0–0.2)
BASOPHILS RELATIVE PERCENT: 0.6 % (ref 0–1)
EOSINOPHILS ABSOLUTE: 0.2 K/UL (ref 0–0.6)
EOSINOPHILS RELATIVE PERCENT: 2.7 % (ref 0–5)
HCT VFR BLD CALC: 38.5 % (ref 42–52)
HEMOGLOBIN: 12.4 G/DL (ref 14–18)
IMMATURE GRANULOCYTES #: 0 K/UL
LYMPHOCYTES ABSOLUTE: 1.4 K/UL (ref 1.1–4.5)
LYMPHOCYTES RELATIVE PERCENT: 20.4 % (ref 20–40)
MCH RBC QN AUTO: 28.1 PG (ref 27–31)
MCHC RBC AUTO-ENTMCNC: 32.2 G/DL (ref 33–37)
MCV RBC AUTO: 87.3 FL (ref 80–94)
MONOCYTES ABSOLUTE: 0.5 K/UL (ref 0–0.9)
MONOCYTES RELATIVE PERCENT: 8 % (ref 0–10)
NEUTROPHILS ABSOLUTE: 4.5 K/UL (ref 1.5–7.5)
NEUTROPHILS RELATIVE PERCENT: 68 % (ref 50–65)
PDW BLD-RTO: 13 % (ref 11.5–14.5)
PLATELET # BLD: 181 K/UL (ref 130–400)
PMV BLD AUTO: 9.4 FL (ref 9.4–12.4)
RBC # BLD: 4.41 M/UL (ref 4.7–6.1)
WBC # BLD: 6.7 K/UL (ref 4.8–10.8)

## 2020-11-25 PROCEDURE — 70450 CT HEAD/BRAIN W/O DYE: CPT

## 2020-11-25 PROCEDURE — 12002 RPR S/N/AX/GEN/TRNK2.6-7.5CM: CPT

## 2020-11-25 PROCEDURE — 72125 CT NECK SPINE W/O DYE: CPT

## 2020-11-25 PROCEDURE — 99285 EMERGENCY DEPT VISIT HI MDM: CPT

## 2020-11-26 VITALS
OXYGEN SATURATION: 98 % | HEART RATE: 98 BPM | DIASTOLIC BLOOD PRESSURE: 76 MMHG | BODY MASS INDEX: 24.38 KG/M2 | HEIGHT: 72 IN | TEMPERATURE: 98 F | SYSTOLIC BLOOD PRESSURE: 108 MMHG | WEIGHT: 180 LBS | RESPIRATION RATE: 20 BRPM

## 2020-11-26 LAB
ALBUMIN SERPL-MCNC: 3.5 G/DL (ref 3.5–5.2)
ALP BLD-CCNC: 83 U/L (ref 40–130)
ALT SERPL-CCNC: 18 U/L (ref 5–41)
ANION GAP SERPL CALCULATED.3IONS-SCNC: 8 MMOL/L (ref 7–19)
AST SERPL-CCNC: 26 U/L (ref 5–40)
BACTERIA: NEGATIVE /HPF
BILIRUB SERPL-MCNC: 0.6 MG/DL (ref 0.2–1.2)
BILIRUBIN URINE: NEGATIVE
BLOOD, URINE: NEGATIVE
BUN BLDV-MCNC: 12 MG/DL (ref 8–23)
CALCIUM SERPL-MCNC: 9.3 MG/DL (ref 8.8–10.2)
CHLORIDE BLD-SCNC: 106 MMOL/L (ref 98–111)
CLARITY: CLEAR
CO2: 26 MMOL/L (ref 22–29)
COLOR: YELLOW
CREAT SERPL-MCNC: 0.6 MG/DL (ref 0.5–1.2)
CRYSTALS, UA: ABNORMAL /HPF
EPITHELIAL CELLS, UA: 0 /HPF (ref 0–5)
GFR AFRICAN AMERICAN: >59
GFR NON-AFRICAN AMERICAN: >60
GLUCOSE BLD-MCNC: 102 MG/DL (ref 74–109)
GLUCOSE URINE: NEGATIVE MG/DL
HYALINE CASTS: 1 /HPF (ref 0–8)
KETONES, URINE: NEGATIVE MG/DL
LEUKOCYTE ESTERASE, URINE: ABNORMAL
NITRITE, URINE: NEGATIVE
PH UA: 6.5 (ref 5–8)
POTASSIUM REFLEX MAGNESIUM: 4.1 MMOL/L (ref 3.5–5)
PROTEIN UA: NEGATIVE MG/DL
RBC UA: 8 /HPF (ref 0–4)
SODIUM BLD-SCNC: 140 MMOL/L (ref 136–145)
SPECIFIC GRAVITY UA: 1.02 (ref 1–1.03)
TOTAL PROTEIN: 6.3 G/DL (ref 6.6–8.7)
TROPONIN: 0.04 NG/ML (ref 0–0.03)
UROBILINOGEN, URINE: 4 E.U./DL
WBC UA: 0 /HPF (ref 0–5)

## 2020-11-26 PROCEDURE — 81001 URINALYSIS AUTO W/SCOPE: CPT

## 2020-11-26 PROCEDURE — 2500000003 HC RX 250 WO HCPCS: Performed by: PEDIATRICS

## 2020-11-26 PROCEDURE — 36415 COLL VENOUS BLD VENIPUNCTURE: CPT

## 2020-11-26 PROCEDURE — 85025 COMPLETE CBC W/AUTO DIFF WBC: CPT

## 2020-11-26 PROCEDURE — 84484 ASSAY OF TROPONIN QUANT: CPT

## 2020-11-26 PROCEDURE — 80053 COMPREHEN METABOLIC PANEL: CPT

## 2020-11-26 RX ORDER — LIDOCAINE HYDROCHLORIDE AND EPINEPHRINE 10; 10 MG/ML; UG/ML
20 INJECTION, SOLUTION INFILTRATION; PERINEURAL ONCE
Status: COMPLETED | OUTPATIENT
Start: 2020-11-26 | End: 2020-11-26

## 2020-11-26 RX ADMIN — LIDOCAINE HYDROCHLORIDE AND EPINEPHRINE 20 ML: 10; 10 INJECTION, SOLUTION INFILTRATION; PERINEURAL at 02:11

## 2020-11-26 ASSESSMENT — PAIN SCALES - GENERAL: PAINLEVEL_OUTOF10: 0

## 2020-11-26 NOTE — ED PROVIDER NOTES
[facility] EMERGENCY DEPT  eMERGENCY dEPARTMENT eNCOUnter      Pt Name: Chelsea Morse  MRN: [id number]  [place] 1950  Date of evaluation: 11/25/2020  Provider: Brandon Hopkins MD    CHIEF COMPLAINT       Chief Complaint   Patient presents with    Fall     forehead lac, bleeding controlled         HISTORY OF PRESENT ILLNESS   (Location/Symptom, Timing/Onset,Context/Setting, Quality, Duration, Modifying Factors, Severity)  Note limiting factors. Chelsea Morse is a 79 y.o. male who presents to the emergency department with scalp laceration. Nursing home states patient fell from wheelchair striking his head. Patient was noted to have 3 inch laceration. There was no loss of consciousness noted. Patient is unable to give history due to his dementia. Bleeding was controlled with direct pressure. Patient denies any pain currently. HPI    NursingNotes were reviewed. REVIEW OF SYSTEMS    (2-9 systems for level 4, 10 or more for level 5)     Review of Systems   Unable to perform ROS: Dementia   Skin: Positive for wound. PAST MEDICALHISTORY     Past Medical History:   Diagnosis Date    Anxiety     Bipolar 1 disorder ([facility]     Dementia ([facility]     Parkinson disease ([facility] [facility]          SURGICAL HISTORY       Past Surgical History:   Procedure Laterality Date    COLONOSCOPY           CURRENT MEDICATIONS     Previous Medications    CITALOPRAM (CELEXA) 20 MG TABLET    Take 20 mg by mouth nightly    DEUTETRABENAZINE 12 MG TABS    Take 12 mg by mouth 2 times daily    DONEPEZIL (ARICEPT) 10 MG TABLET    Take 10 mg by mouth 2 times daily    FLUCONAZOLE (DIFLUCAN) 150 MG TABLET    Take 150 mg by mouth once a week    GABAPENTIN (NEURONTIN) 300 MG CAPSULE    Take 300 mg by mouth 2 times daily.     HYDROXYZINE (ATARAX) 25 MG TABLET    Take 25 mg by mouth nightly    LAMOTRIGINE (LAMICTAL) 100 MG TABLET    Take 100 mg by mouth 2 times daily    MEDROXYPROGESTERONE (PROVERA) 10 MG TABLET    Take 10 mg by mouth 2 times daily    MELATONIN 3 MG TABS TABLET    Take 3 tablets by mouth nightly    PRAMIPEXOLE (MIRAPEX) 1 MG TABLET    Take 1 mg by mouth 3 times daily    QUETIAPINE (SEROQUEL) 100 MG TABLET    Take 100 mg by mouth 3 times daily    TRAZODONE (DESYREL) 150 MG TABLET    Take 1 tablet by mouth nightly       ALLERGIES     Patient has no known allergies.     FAMILY HISTORY       Family History   Family history unknown: Yes          SOCIAL HISTORY       Social History     Socioeconomic History    Marital status: Single     Spouse name: None    Number of children: None    Years of education: None    Highest education level: None   Occupational History    None   Social Needs    Financial resource strain: None    Food insecurity     Worry: None     Inability: None    Transportation needs     Medical: None     Non-medical: None   Tobacco Use    Smoking status: Former Smoker    Smokeless tobacco: Never Used   Substance and Sexual Activity    Alcohol use: Yes     Comment: occasional    Drug use: Not Currently    Sexual activity: Not Currently   Lifestyle    Physical activity     Days per week: None     Minutes per session: None    Stress: None   Relationships    Social connections     Talks on phone: None     Gets together: None     Attends Sabianist service: None     Active member of club or organization: None     Attends meetings of clubs or organizations: None     Relationship status: None    Intimate partner violence     Fear of current or ex partner: None     Emotionally abused: None     Physically abused: None     Forced sexual activity: None   Other Topics Concern    None   Social History Narrative    None       SCREENINGS    [place] Coma Scale  Eye Opening: Spontaneous  Best Verbal Response: Confused  Best Motor Response: Localizes pain  [place] Coma Scale Score: 13        PHYSICAL EXAM    (up to 7 for level 4, 8 or more for level 5)     ED Triage Vitals   BP Temp Temp Source Pulse Resp SpO2 Height peripheral.  Patient has cogwheeling at joints. Psychiatric:         Behavior: Behavior normal.      Comments: Mild agitation. DIAGNOSTIC RESULTS     RADIOLOGY:  Non-plain film images such as CT, Ultrasound and MRI are read by the radiologist. Plain radiographic images are visualized and preliminarily interpreted bythe emergency physician with the below findings:      [address]   Final Result   Mild cerebral and cerebellar volume loss with chronic microvascular   disease but no evidence of acute intracranial process. 2. Mucous cyst right maxillary antrum   Signed by Dr Wilfrid Schilder on 11/25/2020 9:28 PM      CT CERVICAL SPINE WO CONTRAST   Final Result   1. Marked degenerative spondylosis is noted in the cervical spine with   no acute abnormality. Signed by Dr Wilfrid Schilder on 11/25/2020 9:31 PM              LABS:  Labs Reviewed   CBC WITH AUTO DIFFERENTIAL - Abnormal; Notable for the following components:       Result Value    RBC 4.41 (*)     Hemoglobin 12.4 (*)     Hematocrit 38.5 (*)     MCHC 32.2 (*)     Neutrophils % 68.0 (*)     All other components within normal limits   COMPREHENSIVE METABOLIC PANEL W/ REFLEX TO MG FOR LOW K - Abnormal; Notable for the following components: Total Protein 6.3 (*)     All other components within normal limits   URINE RT REFLEX TO CULTURE - Abnormal; Notable for the following components:    Urobilinogen, Urine 4.0 (*)     Leukocyte Esterase, Urine TRACE (*)     All other components within normal limits   TROPONIN - Abnormal; Notable for the following components:    Troponin 0.04 (*)     All other components within normal limits   MICROSCOPIC URINALYSIS - Abnormal; Notable for the following components:    Bacteria, UA NEGATIVE (*)     Crystals, UA NEG (*)     RBC, UA 8 (*)     All other components within normal limits       All other labs were within normal range or not returned as of this dictation.     EMERGENCY DEPARTMENT COURSE and DIFFERENTIAL DIAGNOSIS/MDM:   Vitals:    Vitals:    11/25/20 2220 11/26/20 0127 11/26/20 0200 11/26/20 0229   BP: 113/60 132/76 120/76    Pulse:  78 78    Resp:  20 20    Temp:       TempSrc:       SpO2:  98% 98%    Weight:    180 lb (81.6 kg)   Height:    6' (1.829 m)       MDM  77-year-old male with history of Parkinson's and dementia presents after falling out of a wheelchair and cutting his scalp. Lab and radiology results reviewed. Laceration repaired. Patient will follow up with his primary provider, Delvin Melara MD.  Patient will have sutures removed in 10 to 14 days. Patient will return with redness, heat, drainage, lethargy, increasing confusion, fever, or other concerns. CONSULTS:  None    PROCEDURES:  Unless otherwise noted below, none     Lac Repair    Date/Time: 11/26/2020 3:20 AM  Performed by: Lalito Sepulveda MD  Authorized by: Lalito Sepulveda MD     Consent:     Consent obtained:  Emergent situation  Anesthesia (see MAR for exact dosages): Anesthesia method:  Local infiltration    Local anesthetic:  Lidocaine 1% WITH epi  Laceration details:     Location:  Scalp    Scalp location:  Frontal    Length (cm):  5    Depth (mm):  0.3  Repair type:     Repair type:  Simple  Pre-procedure details:     Preparation:  Patient was prepped and draped in usual sterile fashion and imaging obtained to evaluate for foreign bodies  Exploration:     Hemostasis achieved with:  Epinephrine    Wound exploration: entire depth of wound probed and visualized      Contaminated: no    Treatment:     Area cleansed with:  Betadine and saline    Amount of cleaning:  Standard    Visualized foreign bodies/material removed: no    Skin repair:     Repair method:  Sutures    Suture size:  3-0    Suture material:  Nylon  Approximation:     Approximation:  Close  Post-procedure details:     Dressing:  Antibiotic ointment    Patient tolerance of procedure:   Tolerated well, no immediate complications        FINAL IMPRESSION      1. Fall, initial encounter    2. Laceration of scalp, initial encounter          DISPOSITION/PLAN   DISPOSITION Decision To Discharge 11/26/2020 03:13:30 AM      PATIENT REFERRED TO:  Juliet Sebastian  [address] [place] [address]    Schedule an appointment as soon as possible for a visit   Make appointment for suture removal within 10 to 14 days.       DISCHARGE MEDICATIONS:  New Prescriptions    No medications on file          (Please note that portions of this note were completed with a voice recognition program.  Efforts were made to edit thedictations but occasionally words are mis-transcribed.)    Christian Gudino MD (electronically signed)  Attending Emergency Physician          Chritsian Gudino MD  11/26/20 [id number]

## 2020-11-26 NOTE — ED NOTES
[facility] faxed the referral for Mental Health IOP to [facility] at this time.  Lynn Dooley, MSW, CISW     unsuccessful attempt at IV x2     Osiel Posey RN  11/25/20 [id number]

## 2021-01-01 ENCOUNTER — APPOINTMENT (OUTPATIENT)
Dept: GENERAL RADIOLOGY | Facility: HOSPITAL | Age: 71
End: 2021-01-01

## 2021-01-01 ENCOUNTER — HOSPITAL ENCOUNTER (OUTPATIENT)
Facility: HOSPITAL | Age: 71
End: 2021-02-09
Attending: INTERNAL MEDICINE | Admitting: INTERNAL MEDICINE

## 2021-01-01 VITALS — HEIGHT: 66 IN | WEIGHT: 140.4 LBS | BODY MASS INDEX: 22.56 KG/M2

## 2021-01-01 LAB
ADV 40+41 DNA STL QL NAA+NON-PROBE: NOT DETECTED
ALBUMIN SERPL-MCNC: 2.2 G/DL (ref 3.5–5.2)
ALBUMIN SERPL-MCNC: 2.8 G/DL (ref 3.5–5.2)
ALBUMIN/GLOB SERPL: 0.8 G/DL
ALBUMIN/GLOB SERPL: 0.8 G/DL
ALP SERPL-CCNC: 119 U/L (ref 39–117)
ALP SERPL-CCNC: 220 U/L (ref 39–117)
ALT SERPL W P-5'-P-CCNC: 34 U/L (ref 1–41)
ALT SERPL W P-5'-P-CCNC: 82 U/L (ref 1–41)
ANION GAP SERPL CALCULATED.3IONS-SCNC: 0 MMOL/L (ref 5–15)
ANION GAP SERPL CALCULATED.3IONS-SCNC: 1 MMOL/L (ref 5–15)
ANION GAP SERPL CALCULATED.3IONS-SCNC: 2 MMOL/L (ref 5–15)
ANION GAP SERPL CALCULATED.3IONS-SCNC: 3 MMOL/L (ref 5–15)
ANION GAP SERPL CALCULATED.3IONS-SCNC: 4 MMOL/L (ref 5–15)
ANION GAP SERPL CALCULATED.3IONS-SCNC: 4 MMOL/L (ref 5–15)
ANION GAP SERPL CALCULATED.3IONS-SCNC: 5 MMOL/L (ref 5–15)
ANION GAP SERPL CALCULATED.3IONS-SCNC: 5 MMOL/L (ref 5–15)
ANION GAP SERPL CALCULATED.3IONS-SCNC: 6 MMOL/L (ref 5–15)
ANISOCYTOSIS BLD QL: ABNORMAL
ARTERIAL PATENCY WRIST A: NEGATIVE
ARTERIAL PATENCY WRIST A: POSITIVE
AST SERPL-CCNC: 23 U/L (ref 1–40)
AST SERPL-CCNC: 56 U/L (ref 1–40)
ASTRO TYP 1-8 RNA STL QL NAA+NON-PROBE: NOT DETECTED
ATMOSPHERIC PRESS: 744 MMHG
ATMOSPHERIC PRESS: 747 MMHG
ATMOSPHERIC PRESS: 747 MMHG
ATMOSPHERIC PRESS: 751 MMHG
ATMOSPHERIC PRESS: 754 MMHG
BACTERIA SPEC RESP CULT: NO GROWTH
BASE EXCESS BLDA CALC-SCNC: 15.8 MMOL/L (ref 0–2)
BASE EXCESS BLDA CALC-SCNC: 18.1 MMOL/L (ref 0–2)
BASE EXCESS BLDA CALC-SCNC: 2.1 MMOL/L (ref 0–2)
BASE EXCESS BLDA CALC-SCNC: 3.7 MMOL/L (ref 0–2)
BASE EXCESS BLDA CALC-SCNC: 8.4 MMOL/L (ref 0–2)
BASOPHILS # BLD AUTO: 0.03 10*3/MM3 (ref 0–0.2)
BASOPHILS # BLD AUTO: 0.04 10*3/MM3 (ref 0–0.2)
BASOPHILS # BLD AUTO: 0.04 10*3/MM3 (ref 0–0.2)
BASOPHILS # BLD AUTO: 0.05 10*3/MM3 (ref 0–0.2)
BASOPHILS # BLD AUTO: 0.06 10*3/MM3 (ref 0–0.2)
BASOPHILS # BLD AUTO: 0.07 10*3/MM3 (ref 0–0.2)
BASOPHILS # BLD AUTO: 0.08 10*3/MM3 (ref 0–0.2)
BASOPHILS NFR BLD AUTO: 0.3 % (ref 0–1.5)
BASOPHILS NFR BLD AUTO: 0.4 % (ref 0–1.5)
BASOPHILS NFR BLD AUTO: 0.5 % (ref 0–1.5)
BASOPHILS NFR BLD AUTO: 0.5 % (ref 0–1.5)
BASOPHILS NFR BLD AUTO: 0.6 % (ref 0–1.5)
BASOPHILS NFR BLD AUTO: 0.8 % (ref 0–1.5)
BASOPHILS NFR BLD AUTO: 0.8 % (ref 0–1.5)
BDY SITE: ABNORMAL
BILIRUB SERPL-MCNC: 0.7 MG/DL (ref 0–1.2)
BILIRUB SERPL-MCNC: 0.8 MG/DL (ref 0–1.2)
BODY TEMPERATURE: 37 C
BUN SERPL-MCNC: 13 MG/DL (ref 8–23)
BUN SERPL-MCNC: 14 MG/DL (ref 8–23)
BUN SERPL-MCNC: 15 MG/DL (ref 8–23)
BUN SERPL-MCNC: 16 MG/DL (ref 8–23)
BUN SERPL-MCNC: 17 MG/DL (ref 8–23)
BUN SERPL-MCNC: 18 MG/DL (ref 8–23)
BUN SERPL-MCNC: 18 MG/DL (ref 8–23)
BUN SERPL-MCNC: 20 MG/DL (ref 8–23)
BUN SERPL-MCNC: 24 MG/DL (ref 8–23)
BUN/CREAT SERPL: 46.4 (ref 7–25)
BUN/CREAT SERPL: 53.8 (ref 7–25)
BUN/CREAT SERPL: 55.8 (ref 7–25)
BUN/CREAT SERPL: 61.5 (ref 7–25)
BUN/CREAT SERPL: 64.3 (ref 7–25)
BUN/CREAT SERPL: 64.5 (ref 7–25)
BUN/CREAT SERPL: 71.4 (ref 7–25)
BUN/CREAT SERPL: 73.9 (ref 7–25)
BUN/CREAT SERPL: 75 (ref 7–25)
C CAYETANENSIS DNA STL QL NAA+NON-PROBE: NOT DETECTED
C DIFF TOX GENS STL QL NAA+PROBE: NEGATIVE
CALCIUM SPEC-SCNC: 8.2 MG/DL (ref 8.6–10.5)
CALCIUM SPEC-SCNC: 8.3 MG/DL (ref 8.6–10.5)
CALCIUM SPEC-SCNC: 8.4 MG/DL (ref 8.6–10.5)
CALCIUM SPEC-SCNC: 8.8 MG/DL (ref 8.6–10.5)
CALCIUM SPEC-SCNC: 8.8 MG/DL (ref 8.6–10.5)
CALCIUM SPEC-SCNC: 8.9 MG/DL (ref 8.6–10.5)
CALCIUM SPEC-SCNC: 9 MG/DL (ref 8.6–10.5)
CALCIUM SPEC-SCNC: 9.1 MG/DL (ref 8.6–10.5)
CALCIUM SPEC-SCNC: 9.2 MG/DL (ref 8.6–10.5)
CAMPY SP DNA.DIARRHEA STL QL NAA+PROBE: NOT DETECTED
CHLORIDE SERPL-SCNC: 101 MMOL/L (ref 98–107)
CHLORIDE SERPL-SCNC: 110 MMOL/L (ref 98–107)
CHLORIDE SERPL-SCNC: 90 MMOL/L (ref 98–107)
CHLORIDE SERPL-SCNC: 91 MMOL/L (ref 98–107)
CHLORIDE SERPL-SCNC: 94 MMOL/L (ref 98–107)
CHLORIDE SERPL-SCNC: 95 MMOL/L (ref 98–107)
CHLORIDE SERPL-SCNC: 95 MMOL/L (ref 98–107)
CHLORIDE SERPL-SCNC: 96 MMOL/L (ref 98–107)
CHLORIDE SERPL-SCNC: 96 MMOL/L (ref 98–107)
CLUMPED PLATELETS: PRESENT
CO2 SERPL-SCNC: 26 MMOL/L (ref 22–29)
CO2 SERPL-SCNC: 28 MMOL/L (ref 22–29)
CO2 SERPL-SCNC: 38 MMOL/L (ref 22–29)
CO2 SERPL-SCNC: 40 MMOL/L (ref 22–29)
CO2 SERPL-SCNC: 40 MMOL/L (ref 22–29)
CO2 SERPL-SCNC: 41 MMOL/L (ref 22–29)
CO2 SERPL-SCNC: 42 MMOL/L (ref 22–29)
CO2 SERPL-SCNC: 43 MMOL/L (ref 22–29)
CO2 SERPL-SCNC: 44 MMOL/L (ref 22–29)
CREAT SERPL-MCNC: 0.21 MG/DL (ref 0.76–1.27)
CREAT SERPL-MCNC: 0.23 MG/DL (ref 0.76–1.27)
CREAT SERPL-MCNC: 0.24 MG/DL (ref 0.76–1.27)
CREAT SERPL-MCNC: 0.26 MG/DL (ref 0.76–1.27)
CREAT SERPL-MCNC: 0.26 MG/DL (ref 0.76–1.27)
CREAT SERPL-MCNC: 0.28 MG/DL (ref 0.76–1.27)
CREAT SERPL-MCNC: 0.28 MG/DL (ref 0.76–1.27)
CREAT SERPL-MCNC: 0.31 MG/DL (ref 0.76–1.27)
CREAT SERPL-MCNC: 0.43 MG/DL (ref 0.76–1.27)
CRYPTOSP STL CULT: NOT DETECTED
D DIMER PPP FEU-MCNC: 4.65 MG/L (FEU) (ref 0–0.5)
D-LACTATE SERPL-SCNC: 0.8 MMOL/L (ref 0.5–2)
DEPRECATED RDW RBC AUTO: 44.3 FL (ref 37–54)
DEPRECATED RDW RBC AUTO: 45.8 FL (ref 37–54)
DEPRECATED RDW RBC AUTO: 52.4 FL (ref 37–54)
DEPRECATED RDW RBC AUTO: 55.6 FL (ref 37–54)
DEPRECATED RDW RBC AUTO: 58 FL (ref 37–54)
DEPRECATED RDW RBC AUTO: 58.4 FL (ref 37–54)
DEPRECATED RDW RBC AUTO: 58.8 FL (ref 37–54)
DEPRECATED RDW RBC AUTO: 59.5 FL (ref 37–54)
E HISTOLYT AG STL-ACNC: NOT DETECTED
EAEC PAA PLAS AGGR+AATA ST NAA+NON-PRB: NOT DETECTED
EC STX1 + STX2 GENES STL NAA+PROBE: NOT DETECTED
EOSINOPHIL # BLD AUTO: 0.37 10*3/MM3 (ref 0–0.4)
EOSINOPHIL # BLD AUTO: 0.41 10*3/MM3 (ref 0–0.4)
EOSINOPHIL # BLD AUTO: 0.53 10*3/MM3 (ref 0–0.4)
EOSINOPHIL # BLD AUTO: 0.7 10*3/MM3 (ref 0–0.4)
EOSINOPHIL # BLD AUTO: 0.95 10*3/MM3 (ref 0–0.4)
EOSINOPHIL # BLD AUTO: 0.97 10*3/MM3 (ref 0–0.4)
EOSINOPHIL # BLD AUTO: 1.76 10*3/MM3 (ref 0–0.4)
EOSINOPHIL # BLD MANUAL: 1.45 10*3/MM3 (ref 0–0.4)
EOSINOPHIL NFR BLD AUTO: 12.1 % (ref 0.3–6.2)
EOSINOPHIL NFR BLD AUTO: 12.7 % (ref 0.3–6.2)
EOSINOPHIL NFR BLD AUTO: 13.8 % (ref 0.3–6.2)
EOSINOPHIL NFR BLD AUTO: 3.6 % (ref 0.3–6.2)
EOSINOPHIL NFR BLD AUTO: 4.1 % (ref 0.3–6.2)
EOSINOPHIL NFR BLD AUTO: 4.6 % (ref 0.3–6.2)
EOSINOPHIL NFR BLD AUTO: 6.7 % (ref 0.3–6.2)
EOSINOPHIL NFR BLD MANUAL: 20 % (ref 0.3–6.2)
EPEC EAE GENE STL QL NAA+NON-PROBE: NOT DETECTED
ERYTHROCYTE [DISTWIDTH] IN BLOOD BY AUTOMATED COUNT: 16.4 % (ref 12.3–15.4)
ERYTHROCYTE [DISTWIDTH] IN BLOOD BY AUTOMATED COUNT: 16.7 % (ref 12.3–15.4)
ERYTHROCYTE [DISTWIDTH] IN BLOOD BY AUTOMATED COUNT: 17.6 % (ref 12.3–15.4)
ERYTHROCYTE [DISTWIDTH] IN BLOOD BY AUTOMATED COUNT: 17.7 % (ref 12.3–15.4)
ERYTHROCYTE [DISTWIDTH] IN BLOOD BY AUTOMATED COUNT: 17.9 % (ref 12.3–15.4)
ERYTHROCYTE [DISTWIDTH] IN BLOOD BY AUTOMATED COUNT: 18.3 % (ref 12.3–15.4)
ERYTHROCYTE [DISTWIDTH] IN BLOOD BY AUTOMATED COUNT: 18.3 % (ref 12.3–15.4)
ERYTHROCYTE [DISTWIDTH] IN BLOOD BY AUTOMATED COUNT: 18.4 % (ref 12.3–15.4)
ETEC LTA+ST1A+ST1B TOX ST NAA+NON-PROBE: NOT DETECTED
G LAMBLIA DNA SPEC QL NAA+PROBE: NOT DETECTED
GFR SERPL CREATININE-BSD FRML MDRD: >150 ML/MIN/1.73
GLOBULIN UR ELPH-MCNC: 2.7 GM/DL
GLOBULIN UR ELPH-MCNC: 3.4 GM/DL
GLUCOSE BLDC GLUCOMTR-MCNC: 101 MG/DL (ref 70–130)
GLUCOSE BLDC GLUCOMTR-MCNC: 103 MG/DL (ref 70–130)
GLUCOSE BLDC GLUCOMTR-MCNC: 107 MG/DL (ref 70–130)
GLUCOSE BLDC GLUCOMTR-MCNC: 107 MG/DL (ref 70–130)
GLUCOSE BLDC GLUCOMTR-MCNC: 108 MG/DL (ref 70–130)
GLUCOSE BLDC GLUCOMTR-MCNC: 108 MG/DL (ref 70–130)
GLUCOSE BLDC GLUCOMTR-MCNC: 109 MG/DL (ref 70–130)
GLUCOSE BLDC GLUCOMTR-MCNC: 110 MG/DL (ref 70–130)
GLUCOSE BLDC GLUCOMTR-MCNC: 110 MG/DL (ref 70–130)
GLUCOSE BLDC GLUCOMTR-MCNC: 111 MG/DL (ref 70–130)
GLUCOSE BLDC GLUCOMTR-MCNC: 112 MG/DL (ref 70–130)
GLUCOSE BLDC GLUCOMTR-MCNC: 112 MG/DL (ref 70–130)
GLUCOSE BLDC GLUCOMTR-MCNC: 114 MG/DL (ref 70–130)
GLUCOSE BLDC GLUCOMTR-MCNC: 115 MG/DL (ref 70–130)
GLUCOSE BLDC GLUCOMTR-MCNC: 115 MG/DL (ref 70–130)
GLUCOSE BLDC GLUCOMTR-MCNC: 116 MG/DL (ref 70–130)
GLUCOSE BLDC GLUCOMTR-MCNC: 118 MG/DL (ref 70–130)
GLUCOSE BLDC GLUCOMTR-MCNC: 118 MG/DL (ref 70–130)
GLUCOSE BLDC GLUCOMTR-MCNC: 120 MG/DL (ref 70–130)
GLUCOSE BLDC GLUCOMTR-MCNC: 120 MG/DL (ref 70–130)
GLUCOSE BLDC GLUCOMTR-MCNC: 121 MG/DL (ref 70–130)
GLUCOSE BLDC GLUCOMTR-MCNC: 122 MG/DL (ref 70–130)
GLUCOSE BLDC GLUCOMTR-MCNC: 123 MG/DL (ref 70–130)
GLUCOSE BLDC GLUCOMTR-MCNC: 124 MG/DL (ref 70–130)
GLUCOSE BLDC GLUCOMTR-MCNC: 125 MG/DL (ref 70–130)
GLUCOSE BLDC GLUCOMTR-MCNC: 125 MG/DL (ref 70–130)
GLUCOSE BLDC GLUCOMTR-MCNC: 126 MG/DL (ref 70–130)
GLUCOSE BLDC GLUCOMTR-MCNC: 126 MG/DL (ref 70–130)
GLUCOSE BLDC GLUCOMTR-MCNC: 127 MG/DL (ref 70–130)
GLUCOSE BLDC GLUCOMTR-MCNC: 129 MG/DL (ref 70–130)
GLUCOSE BLDC GLUCOMTR-MCNC: 130 MG/DL (ref 70–130)
GLUCOSE BLDC GLUCOMTR-MCNC: 131 MG/DL (ref 70–130)
GLUCOSE BLDC GLUCOMTR-MCNC: 131 MG/DL (ref 70–130)
GLUCOSE BLDC GLUCOMTR-MCNC: 133 MG/DL (ref 70–130)
GLUCOSE BLDC GLUCOMTR-MCNC: 133 MG/DL (ref 70–130)
GLUCOSE BLDC GLUCOMTR-MCNC: 134 MG/DL (ref 70–130)
GLUCOSE BLDC GLUCOMTR-MCNC: 134 MG/DL (ref 70–130)
GLUCOSE BLDC GLUCOMTR-MCNC: 135 MG/DL (ref 70–130)
GLUCOSE BLDC GLUCOMTR-MCNC: 136 MG/DL (ref 70–130)
GLUCOSE BLDC GLUCOMTR-MCNC: 137 MG/DL (ref 70–130)
GLUCOSE BLDC GLUCOMTR-MCNC: 138 MG/DL (ref 70–130)
GLUCOSE BLDC GLUCOMTR-MCNC: 139 MG/DL (ref 70–130)
GLUCOSE BLDC GLUCOMTR-MCNC: 140 MG/DL (ref 70–130)
GLUCOSE BLDC GLUCOMTR-MCNC: 142 MG/DL (ref 70–130)
GLUCOSE BLDC GLUCOMTR-MCNC: 143 MG/DL (ref 70–130)
GLUCOSE BLDC GLUCOMTR-MCNC: 143 MG/DL (ref 70–130)
GLUCOSE BLDC GLUCOMTR-MCNC: 144 MG/DL (ref 70–130)
GLUCOSE BLDC GLUCOMTR-MCNC: 146 MG/DL (ref 70–130)
GLUCOSE BLDC GLUCOMTR-MCNC: 146 MG/DL (ref 70–130)
GLUCOSE BLDC GLUCOMTR-MCNC: 147 MG/DL (ref 70–130)
GLUCOSE BLDC GLUCOMTR-MCNC: 147 MG/DL (ref 70–130)
GLUCOSE BLDC GLUCOMTR-MCNC: 148 MG/DL (ref 70–130)
GLUCOSE BLDC GLUCOMTR-MCNC: 149 MG/DL (ref 70–130)
GLUCOSE BLDC GLUCOMTR-MCNC: 150 MG/DL (ref 70–130)
GLUCOSE BLDC GLUCOMTR-MCNC: 151 MG/DL (ref 70–130)
GLUCOSE BLDC GLUCOMTR-MCNC: 153 MG/DL (ref 70–130)
GLUCOSE BLDC GLUCOMTR-MCNC: 156 MG/DL (ref 70–130)
GLUCOSE BLDC GLUCOMTR-MCNC: 157 MG/DL (ref 70–130)
GLUCOSE BLDC GLUCOMTR-MCNC: 158 MG/DL (ref 70–130)
GLUCOSE BLDC GLUCOMTR-MCNC: 159 MG/DL (ref 70–130)
GLUCOSE BLDC GLUCOMTR-MCNC: 163 MG/DL (ref 70–130)
GLUCOSE BLDC GLUCOMTR-MCNC: 169 MG/DL (ref 70–130)
GLUCOSE BLDC GLUCOMTR-MCNC: 171 MG/DL (ref 70–130)
GLUCOSE BLDC GLUCOMTR-MCNC: 180 MG/DL (ref 70–130)
GLUCOSE BLDC GLUCOMTR-MCNC: 182 MG/DL (ref 70–130)
GLUCOSE BLDC GLUCOMTR-MCNC: 185 MG/DL (ref 70–130)
GLUCOSE BLDC GLUCOMTR-MCNC: 192 MG/DL (ref 70–130)
GLUCOSE BLDC GLUCOMTR-MCNC: 272 MG/DL (ref 70–130)
GLUCOSE BLDC GLUCOMTR-MCNC: 94 MG/DL (ref 70–130)
GLUCOSE BLDC GLUCOMTR-MCNC: 97 MG/DL (ref 70–130)
GLUCOSE BLDC GLUCOMTR-MCNC: 98 MG/DL (ref 70–130)
GLUCOSE SERPL-MCNC: 106 MG/DL (ref 65–99)
GLUCOSE SERPL-MCNC: 110 MG/DL (ref 65–99)
GLUCOSE SERPL-MCNC: 113 MG/DL (ref 65–99)
GLUCOSE SERPL-MCNC: 114 MG/DL (ref 65–99)
GLUCOSE SERPL-MCNC: 121 MG/DL (ref 65–99)
GLUCOSE SERPL-MCNC: 124 MG/DL (ref 65–99)
GLUCOSE SERPL-MCNC: 131 MG/DL (ref 65–99)
GLUCOSE SERPL-MCNC: 143 MG/DL (ref 65–99)
GLUCOSE SERPL-MCNC: 180 MG/DL (ref 65–99)
GRAM STN SPEC: NORMAL
HCO3 BLDA-SCNC: 27 MMOL/L (ref 20–26)
HCO3 BLDA-SCNC: 27.5 MMOL/L (ref 20–26)
HCO3 BLDA-SCNC: 38.9 MMOL/L (ref 20–26)
HCO3 BLDA-SCNC: 42.5 MMOL/L (ref 20–26)
HCO3 BLDA-SCNC: 43.6 MMOL/L (ref 20–26)
HCT VFR BLD AUTO: 26.3 % (ref 37.5–51)
HCT VFR BLD AUTO: 26.5 % (ref 37.5–51)
HCT VFR BLD AUTO: 28 % (ref 37.5–51)
HCT VFR BLD AUTO: 28.8 % (ref 37.5–51)
HCT VFR BLD AUTO: 29.3 % (ref 37.5–51)
HCT VFR BLD AUTO: 29.4 % (ref 37.5–51)
HCT VFR BLD AUTO: 29.5 % (ref 37.5–51)
HCT VFR BLD AUTO: 31.2 % (ref 37.5–51)
HGB BLD-MCNC: 10 G/DL (ref 13–17.7)
HGB BLD-MCNC: 8.3 G/DL (ref 13–17.7)
HGB BLD-MCNC: 8.8 G/DL (ref 13–17.7)
HGB BLD-MCNC: 8.9 G/DL (ref 13–17.7)
HGB BLD-MCNC: 9 G/DL (ref 13–17.7)
HGB BLD-MCNC: 9.4 G/DL (ref 13–17.7)
HGB BLD-MCNC: 9.4 G/DL (ref 13–17.7)
HGB BLD-MCNC: 9.5 G/DL (ref 13–17.7)
IMM GRANULOCYTES # BLD AUTO: 0.07 10*3/MM3 (ref 0–0.05)
IMM GRANULOCYTES # BLD AUTO: 0.08 10*3/MM3 (ref 0–0.05)
IMM GRANULOCYTES # BLD AUTO: 0.09 10*3/MM3 (ref 0–0.05)
IMM GRANULOCYTES # BLD AUTO: 0.15 10*3/MM3 (ref 0–0.05)
IMM GRANULOCYTES # BLD AUTO: 0.16 10*3/MM3 (ref 0–0.05)
IMM GRANULOCYTES # BLD AUTO: 0.18 10*3/MM3 (ref 0–0.05)
IMM GRANULOCYTES # BLD AUTO: 0.2 10*3/MM3 (ref 0–0.05)
IMM GRANULOCYTES NFR BLD AUTO: 0.7 % (ref 0–0.5)
IMM GRANULOCYTES NFR BLD AUTO: 0.8 % (ref 0–0.5)
IMM GRANULOCYTES NFR BLD AUTO: 1.3 % (ref 0–0.5)
IMM GRANULOCYTES NFR BLD AUTO: 1.4 % (ref 0–0.5)
IMM GRANULOCYTES NFR BLD AUTO: 2.4 % (ref 0–0.5)
INHALED O2 CONCENTRATION: 40 %
INHALED O2 CONCENTRATION: 50 %
INHALED O2 CONCENTRATION: 70 %
LYMPHOCYTES # BLD AUTO: 0.78 10*3/MM3 (ref 0.7–3.1)
LYMPHOCYTES # BLD AUTO: 0.78 10*3/MM3 (ref 0.7–3.1)
LYMPHOCYTES # BLD AUTO: 0.82 10*3/MM3 (ref 0.7–3.1)
LYMPHOCYTES # BLD AUTO: 0.88 10*3/MM3 (ref 0.7–3.1)
LYMPHOCYTES # BLD AUTO: 0.91 10*3/MM3 (ref 0.7–3.1)
LYMPHOCYTES # BLD AUTO: 0.98 10*3/MM3 (ref 0.7–3.1)
LYMPHOCYTES # BLD AUTO: 1.08 10*3/MM3 (ref 0.7–3.1)
LYMPHOCYTES # BLD MANUAL: 0.73 10*3/MM3 (ref 0.7–3.1)
LYMPHOCYTES NFR BLD AUTO: 11.9 % (ref 19.6–45.3)
LYMPHOCYTES NFR BLD AUTO: 11.9 % (ref 19.6–45.3)
LYMPHOCYTES NFR BLD AUTO: 5.4 % (ref 19.6–45.3)
LYMPHOCYTES NFR BLD AUTO: 7.5 % (ref 19.6–45.3)
LYMPHOCYTES NFR BLD AUTO: 8.6 % (ref 19.6–45.3)
LYMPHOCYTES NFR BLD AUTO: 9.4 % (ref 19.6–45.3)
LYMPHOCYTES NFR BLD AUTO: 9.6 % (ref 19.6–45.3)
LYMPHOCYTES NFR BLD MANUAL: 10 % (ref 19.6–45.3)
LYMPHOCYTES NFR BLD MANUAL: 4 % (ref 5–12)
Lab: ABNORMAL
MCH RBC QN AUTO: 27.2 PG (ref 26.6–33)
MCH RBC QN AUTO: 27.5 PG (ref 26.6–33)
MCH RBC QN AUTO: 27.5 PG (ref 26.6–33)
MCH RBC QN AUTO: 28.5 PG (ref 26.6–33)
MCH RBC QN AUTO: 28.9 PG (ref 26.6–33)
MCH RBC QN AUTO: 29 PG (ref 26.6–33)
MCH RBC QN AUTO: 29.1 PG (ref 26.6–33)
MCH RBC QN AUTO: 29.1 PG (ref 26.6–33)
MCHC RBC AUTO-ENTMCNC: 30.4 G/DL (ref 31.5–35.7)
MCHC RBC AUTO-ENTMCNC: 31.4 G/DL (ref 31.5–35.7)
MCHC RBC AUTO-ENTMCNC: 31.6 G/DL (ref 31.5–35.7)
MCHC RBC AUTO-ENTMCNC: 32 G/DL (ref 31.5–35.7)
MCHC RBC AUTO-ENTMCNC: 32.1 G/DL (ref 31.5–35.7)
MCHC RBC AUTO-ENTMCNC: 32.2 G/DL (ref 31.5–35.7)
MCHC RBC AUTO-ENTMCNC: 32.6 G/DL (ref 31.5–35.7)
MCHC RBC AUTO-ENTMCNC: 34 G/DL (ref 31.5–35.7)
MCV RBC AUTO: 85.2 FL (ref 79–97)
MCV RBC AUTO: 85.3 FL (ref 79–97)
MCV RBC AUTO: 86.7 FL (ref 79–97)
MCV RBC AUTO: 89.2 FL (ref 79–97)
MCV RBC AUTO: 90.4 FL (ref 79–97)
MCV RBC AUTO: 91 FL (ref 79–97)
MODALITY: ABNORMAL
MONOCYTES # BLD AUTO: 0.29 10*3/MM3 (ref 0.1–0.9)
MONOCYTES # BLD AUTO: 0.48 10*3/MM3 (ref 0.1–0.9)
MONOCYTES # BLD AUTO: 0.55 10*3/MM3 (ref 0.1–0.9)
MONOCYTES # BLD AUTO: 0.59 10*3/MM3 (ref 0.1–0.9)
MONOCYTES # BLD AUTO: 0.69 10*3/MM3 (ref 0.1–0.9)
MONOCYTES # BLD AUTO: 0.7 10*3/MM3 (ref 0.1–0.9)
MONOCYTES # BLD AUTO: 0.77 10*3/MM3 (ref 0.1–0.9)
MONOCYTES # BLD AUTO: 0.77 10*3/MM3 (ref 0.1–0.9)
MONOCYTES NFR BLD AUTO: 4.8 % (ref 5–12)
MONOCYTES NFR BLD AUTO: 4.8 % (ref 5–12)
MONOCYTES NFR BLD AUTO: 6.1 % (ref 5–12)
MONOCYTES NFR BLD AUTO: 7 % (ref 5–12)
MONOCYTES NFR BLD AUTO: 7.4 % (ref 5–12)
MONOCYTES NFR BLD AUTO: 7.7 % (ref 5–12)
MONOCYTES NFR BLD AUTO: 8.4 % (ref 5–12)
NEUTROPHILS # BLD AUTO: 4.65 10*3/MM3 (ref 1.7–7)
NEUTROPHILS NFR BLD AUTO: 11.01 10*3/MM3 (ref 1.7–7)
NEUTROPHILS NFR BLD AUTO: 4.49 10*3/MM3 (ref 1.7–7)
NEUTROPHILS NFR BLD AUTO: 4.91 10*3/MM3 (ref 1.7–7)
NEUTROPHILS NFR BLD AUTO: 6.99 10*3/MM3 (ref 1.7–7)
NEUTROPHILS NFR BLD AUTO: 64.5 % (ref 42.7–76)
NEUTROPHILS NFR BLD AUTO: 65.4 % (ref 42.7–76)
NEUTROPHILS NFR BLD AUTO: 7.97 10*3/MM3 (ref 1.7–7)
NEUTROPHILS NFR BLD AUTO: 75.8 % (ref 42.7–76)
NEUTROPHILS NFR BLD AUTO: 76.2 % (ref 42.7–76)
NEUTROPHILS NFR BLD AUTO: 76.6 % (ref 42.7–76)
NEUTROPHILS NFR BLD AUTO: 79.6 % (ref 42.7–76)
NEUTROPHILS NFR BLD AUTO: 80.5 % (ref 42.7–76)
NEUTROPHILS NFR BLD AUTO: 9.08 10*3/MM3 (ref 1.7–7)
NEUTROPHILS NFR BLD AUTO: 9.24 10*3/MM3 (ref 1.7–7)
NEUTROPHILS NFR BLD MANUAL: 64 % (ref 42.7–76)
NOROVIRUS GI+II RNA STL QL NAA+NON-PROBE: NOT DETECTED
NOTIFIED BY: ABNORMAL
NOTIFIED WHO: ABNORMAL
NRBC BLD AUTO-RTO: 0 /100 WBC (ref 0–0.2)
NT-PROBNP SERPL-MCNC: 250.8 PG/ML (ref 0–900)
NT-PROBNP SERPL-MCNC: 339.1 PG/ML (ref 0–900)
NT-PROBNP SERPL-MCNC: 571.6 PG/ML (ref 0–900)
P SHIGELLOIDES DNA STL QL NAA+PROBE: NOT DETECTED
PAW @ PEAK INSP FLOW SETTING VENT: 17 CMH2O
PAW @ PEAK INSP FLOW SETTING VENT: 24 CMH2O
PCO2 BLDA: 37.3 MM HG (ref 35–45)
PCO2 BLDA: 42 MM HG (ref 35–45)
PCO2 BLDA: 56.9 MM HG (ref 35–45)
PCO2 BLDA: 66 MM HG (ref 35–45)
PCO2 BLDA: 98.2 MM HG (ref 35–45)
PCO2 TEMP ADJ BLD: 37.3 MM HG (ref 35–45)
PCO2 TEMP ADJ BLD: 42 MM HG (ref 35–45)
PCO2 TEMP ADJ BLD: 56.9 MM HG (ref 35–45)
PCO2 TEMP ADJ BLD: 66 MM HG (ref 35–45)
PCO2 TEMP ADJ BLD: 98.2 MM HG (ref 35–45)
PEEP RESPIRATORY: 5 CM[H2O]
PEEP RESPIRATORY: 5 CM[H2O]
PEEP RESPIRATORY: 6 CM[H2O]
PEEP RESPIRATORY: 6 CM[H2O]
PEEP RESPIRATORY: 8 CM[H2O]
PH BLDA: 7.21 PH UNITS (ref 7.35–7.45)
PH BLDA: 7.42 PH UNITS (ref 7.35–7.45)
PH BLDA: 7.42 PH UNITS (ref 7.35–7.45)
PH BLDA: 7.48 PH UNITS (ref 7.35–7.45)
PH BLDA: 7.49 PH UNITS (ref 7.35–7.45)
PH, TEMP CORRECTED: 7.21 PH UNITS (ref 7.35–7.45)
PH, TEMP CORRECTED: 7.42 PH UNITS (ref 7.35–7.45)
PH, TEMP CORRECTED: 7.42 PH UNITS (ref 7.35–7.45)
PH, TEMP CORRECTED: 7.48 PH UNITS (ref 7.35–7.45)
PH, TEMP CORRECTED: 7.49 PH UNITS (ref 7.35–7.45)
PLATELET # BLD AUTO: 143 10*3/MM3 (ref 140–450)
PLATELET # BLD AUTO: 160 10*3/MM3 (ref 140–450)
PLATELET # BLD AUTO: 185 10*3/MM3 (ref 140–450)
PLATELET # BLD AUTO: 208 10*3/MM3 (ref 140–450)
PLATELET # BLD AUTO: 219 10*3/MM3 (ref 140–450)
PLATELET # BLD AUTO: 220 10*3/MM3 (ref 140–450)
PLATELET # BLD AUTO: 261 10*3/MM3 (ref 140–450)
PLATELET # BLD AUTO: 319 10*3/MM3 (ref 140–450)
PMV BLD AUTO: 10.3 FL (ref 6–12)
PMV BLD AUTO: 10.6 FL (ref 6–12)
PMV BLD AUTO: 10.7 FL (ref 6–12)
PMV BLD AUTO: 10.7 FL (ref 6–12)
PMV BLD AUTO: 11.2 FL (ref 6–12)
PMV BLD AUTO: 11.3 FL (ref 6–12)
PMV BLD AUTO: 12.2 FL (ref 6–12)
PMV BLD AUTO: 9.9 FL (ref 6–12)
PO2 BLDA: 134 MM HG (ref 83–108)
PO2 BLDA: 82.6 MM HG (ref 83–108)
PO2 BLDA: 83.1 MM HG (ref 83–108)
PO2 BLDA: 96.1 MM HG (ref 83–108)
PO2 BLDA: 99.3 MM HG (ref 83–108)
PO2 TEMP ADJ BLD: 134 MM HG (ref 83–108)
PO2 TEMP ADJ BLD: 82.6 MM HG (ref 83–108)
PO2 TEMP ADJ BLD: 83.1 MM HG (ref 83–108)
PO2 TEMP ADJ BLD: 96.1 MM HG (ref 83–108)
PO2 TEMP ADJ BLD: 99.3 MM HG (ref 83–108)
POIKILOCYTOSIS BLD QL SMEAR: ABNORMAL
POTASSIUM SERPL-SCNC: 3.8 MMOL/L (ref 3.5–5.2)
POTASSIUM SERPL-SCNC: 3.9 MMOL/L (ref 3.5–5.2)
POTASSIUM SERPL-SCNC: 4 MMOL/L (ref 3.5–5.2)
POTASSIUM SERPL-SCNC: 4 MMOL/L (ref 3.5–5.2)
POTASSIUM SERPL-SCNC: 4.1 MMOL/L (ref 3.5–5.2)
POTASSIUM SERPL-SCNC: 4.1 MMOL/L (ref 3.5–5.2)
POTASSIUM SERPL-SCNC: 4.2 MMOL/L (ref 3.5–5.2)
POTASSIUM SERPL-SCNC: 4.3 MMOL/L (ref 3.5–5.2)
POTASSIUM SERPL-SCNC: 4.4 MMOL/L (ref 3.5–5.2)
PREALB SERPL-MCNC: 15 MG/DL (ref 20–40)
PROT SERPL-MCNC: 4.9 G/DL (ref 6–8.5)
PROT SERPL-MCNC: 6.2 G/DL (ref 6–8.5)
QT INTERVAL: 388 MS
QTC INTERVAL: 458 MS
RBC # BLD AUTO: 2.91 10*6/MM3 (ref 4.14–5.8)
RBC # BLD AUTO: 3.11 10*6/MM3 (ref 4.14–5.8)
RBC # BLD AUTO: 3.23 10*6/MM3 (ref 4.14–5.8)
RBC # BLD AUTO: 3.24 10*6/MM3 (ref 4.14–5.8)
RBC # BLD AUTO: 3.45 10*6/MM3 (ref 4.14–5.8)
RBC # BLD AUTO: 3.46 10*6/MM3 (ref 4.14–5.8)
RV RNA STL NAA+PROBE: NOT DETECTED
SALMONELLA DNA SPEC QL NAA+PROBE: NOT DETECTED
SAO2 % BLDCOA: 95.7 % (ref 94–99)
SAO2 % BLDCOA: 97.3 % (ref 94–99)
SAO2 % BLDCOA: 98 % (ref 94–99)
SAO2 % BLDCOA: 98.4 % (ref 94–99)
SAO2 % BLDCOA: 99.9 % (ref 94–99)
SAPO I+II+IV+V RNA STL QL NAA+NON-PROBE: NOT DETECTED
SET MECH RESP RATE: 14
SET MECH RESP RATE: 18
SHIGELLA SP+EIEC IPAH STL QL NAA+PROBE: NOT DETECTED
SODIUM SERPL-SCNC: 130 MMOL/L (ref 136–145)
SODIUM SERPL-SCNC: 134 MMOL/L (ref 136–145)
SODIUM SERPL-SCNC: 138 MMOL/L (ref 136–145)
SODIUM SERPL-SCNC: 139 MMOL/L (ref 136–145)
SODIUM SERPL-SCNC: 140 MMOL/L (ref 136–145)
SODIUM SERPL-SCNC: 142 MMOL/L (ref 136–145)
TROPONIN T SERPL-MCNC: <0.01 NG/ML (ref 0–0.03)
V CHOLERAE DNA SPEC QL NAA+PROBE: NOT DETECTED
VARIANT LYMPHS NFR BLD MANUAL: 2 % (ref 0–5)
VENTILATOR MODE: ABNORMAL
VENTILATOR MODE: ABNORMAL
VENTILATOR MODE: AC
VIBRIO DNA SPEC NAA+PROBE: NOT DETECTED
VT ON VENT VENT: 400 ML
VT ON VENT VENT: 450 ML
VT ON VENT VENT: 450 ML
WBC # BLD AUTO: 10.45 10*3/MM3 (ref 3.4–10.8)
WBC # BLD AUTO: 11.4 10*3/MM3 (ref 3.4–10.8)
WBC # BLD AUTO: 11.47 10*3/MM3 (ref 3.4–10.8)
WBC # BLD AUTO: 14.52 10*3/MM3 (ref 3.4–10.8)
WBC # BLD AUTO: 6.87 10*3/MM3 (ref 3.4–10.8)
WBC # BLD AUTO: 7.26 10*3/MM3 (ref 3.4–10.8)
WBC # BLD AUTO: 7.62 10*3/MM3 (ref 3.4–10.8)
WBC # BLD AUTO: 9.13 10*3/MM3 (ref 3.4–10.8)
WBC MORPH BLD: NORMAL
YERSINIA STL CULT: NOT DETECTED

## 2021-01-01 PROCEDURE — 25010000002 METOCLOPRAMIDE PER 10 MG: Performed by: INTERNAL MEDICINE

## 2021-01-01 PROCEDURE — 71045 X-RAY EXAM CHEST 1 VIEW: CPT

## 2021-01-01 PROCEDURE — 99233 SBSQ HOSP IP/OBS HIGH 50: CPT | Performed by: INTERNAL MEDICINE

## 2021-01-01 PROCEDURE — 25010000002 MEROPENEM: Performed by: INTERNAL MEDICINE

## 2021-01-01 PROCEDURE — 82962 GLUCOSE BLOOD TEST: CPT

## 2021-01-01 PROCEDURE — 31502 CHANGE OF WINDPIPE AIRWAY: CPT | Performed by: OTOLARYNGOLOGY

## 2021-01-01 PROCEDURE — 31575 DIAGNOSTIC LARYNGOSCOPY: CPT | Performed by: OTOLARYNGOLOGY

## 2021-01-01 PROCEDURE — 85007 BL SMEAR W/DIFF WBC COUNT: CPT | Performed by: INTERNAL MEDICINE

## 2021-01-01 PROCEDURE — 63710000001 INSULIN LISPRO (HUMAN) PER 5 UNITS: Performed by: INTERNAL MEDICINE

## 2021-01-01 PROCEDURE — 25010000002 ENOXAPARIN PER 10 MG: Performed by: INTERNAL MEDICINE

## 2021-01-01 PROCEDURE — 92526 ORAL FUNCTION THERAPY: CPT

## 2021-01-01 PROCEDURE — 99231 SBSQ HOSP IP/OBS SF/LOW 25: CPT | Performed by: THORACIC SURGERY (CARDIOTHORACIC VASCULAR SURGERY)

## 2021-01-01 PROCEDURE — 85025 COMPLETE CBC W/AUTO DIFF WBC: CPT | Performed by: INTERNAL MEDICINE

## 2021-01-01 PROCEDURE — 25010000002 LINEZOLID 600 MG/300ML SOLUTION: Performed by: INTERNAL MEDICINE

## 2021-01-01 PROCEDURE — 80048 BASIC METABOLIC PNL TOTAL CA: CPT | Performed by: INTERNAL MEDICINE

## 2021-01-01 PROCEDURE — 99232 SBSQ HOSP IP/OBS MODERATE 35: CPT | Performed by: OTOLARYNGOLOGY

## 2021-01-01 PROCEDURE — 99232 SBSQ HOSP IP/OBS MODERATE 35: CPT | Performed by: SURGERY

## 2021-01-01 PROCEDURE — 83605 ASSAY OF LACTIC ACID: CPT | Performed by: INTERNAL MEDICINE

## 2021-01-01 PROCEDURE — 92523 SPEECH SOUND LANG COMPREHEN: CPT

## 2021-01-01 PROCEDURE — 93010 ELECTROCARDIOGRAM REPORT: CPT | Performed by: INTERNAL MEDICINE

## 2021-01-01 PROCEDURE — 25010000003 MORPHINE PER 10 MG: Performed by: INTERNAL MEDICINE

## 2021-01-01 PROCEDURE — 82803 BLOOD GASES ANY COMBINATION: CPT

## 2021-01-01 PROCEDURE — 85379 FIBRIN DEGRADATION QUANT: CPT | Performed by: INTERNAL MEDICINE

## 2021-01-01 PROCEDURE — 99231 SBSQ HOSP IP/OBS SF/LOW 25: CPT | Performed by: SURGERY

## 2021-01-01 PROCEDURE — 83880 ASSAY OF NATRIURETIC PEPTIDE: CPT | Performed by: INTERNAL MEDICINE

## 2021-01-01 PROCEDURE — 25010000002 FUROSEMIDE PER 20 MG: Performed by: PHYSICIAN ASSISTANT

## 2021-01-01 PROCEDURE — 25010000002 FUROSEMIDE PER 20 MG: Performed by: INTERNAL MEDICINE

## 2021-01-01 PROCEDURE — 25010000002 MORPHINE SULFATE (PF) 2 MG/ML SOLUTION: Performed by: INTERNAL MEDICINE

## 2021-01-01 PROCEDURE — 92507 TX SP LANG VOICE COMM INDIV: CPT

## 2021-01-01 PROCEDURE — 25010000002 MORPHINE SULFATE (PF) 2 MG/ML SOLUTION: Performed by: PHYSICIAN ASSISTANT

## 2021-01-01 PROCEDURE — 84484 ASSAY OF TROPONIN QUANT: CPT | Performed by: INTERNAL MEDICINE

## 2021-01-01 PROCEDURE — 99222 1ST HOSP IP/OBS MODERATE 55: CPT | Performed by: OTOLARYNGOLOGY

## 2021-01-01 PROCEDURE — 92526 ORAL FUNCTION THERAPY: CPT | Performed by: SPEECH-LANGUAGE PATHOLOGIST

## 2021-01-01 PROCEDURE — 93005 ELECTROCARDIOGRAM TRACING: CPT | Performed by: INTERNAL MEDICINE

## 2021-01-01 PROCEDURE — 0097U HC BIOFIRE FILMARRAY GI PANEL: CPT | Performed by: INTERNAL MEDICINE

## 2021-01-01 PROCEDURE — 92597 ORAL SPEECH DEVICE EVAL: CPT

## 2021-01-01 PROCEDURE — 31615 TRCHEOBRNCHSC EST TRACHS INC: CPT | Performed by: OTOLARYNGOLOGY

## 2021-01-01 PROCEDURE — 36600 WITHDRAWAL OF ARTERIAL BLOOD: CPT

## 2021-01-01 PROCEDURE — 87070 CULTURE OTHR SPECIMN AEROBIC: CPT | Performed by: INTERNAL MEDICINE

## 2021-01-01 PROCEDURE — 99223 1ST HOSP IP/OBS HIGH 75: CPT | Performed by: INTERNAL MEDICINE

## 2021-01-01 PROCEDURE — 92610 EVALUATE SWALLOWING FUNCTION: CPT

## 2021-01-01 PROCEDURE — 84134 ASSAY OF PREALBUMIN: CPT | Performed by: INTERNAL MEDICINE

## 2021-01-01 PROCEDURE — 87493 C DIFF AMPLIFIED PROBE: CPT | Performed by: INTERNAL MEDICINE

## 2021-01-01 PROCEDURE — 80053 COMPREHEN METABOLIC PANEL: CPT | Performed by: INTERNAL MEDICINE

## 2021-01-01 PROCEDURE — 87205 SMEAR GRAM STAIN: CPT | Performed by: INTERNAL MEDICINE

## 2021-01-01 RX ORDER — ACETAMINOPHEN 325 MG/1
650 TABLET ORAL EVERY 4 HOURS PRN
Status: DISCONTINUED | OUTPATIENT
Start: 2021-01-01 | End: 2021-01-01 | Stop reason: HOSPADM

## 2021-01-01 RX ORDER — DONEPEZIL HYDROCHLORIDE 10 MG/1
10 TABLET, FILM COATED ORAL 2 TIMES DAILY
Status: DISCONTINUED | OUTPATIENT
Start: 2021-01-01 | End: 2021-01-01

## 2021-01-01 RX ORDER — GABAPENTIN 250 MG/5ML
100 SOLUTION ORAL EVERY 12 HOURS SCHEDULED
Status: DISCONTINUED | OUTPATIENT
Start: 2021-01-01 | End: 2021-01-01

## 2021-01-01 RX ORDER — MIDODRINE HYDROCHLORIDE 10 MG/1
10 TABLET ORAL
Status: DISCONTINUED | OUTPATIENT
Start: 2021-01-01 | End: 2021-01-01

## 2021-01-01 RX ORDER — ACETAMINOPHEN 650 MG/1
650 SUPPOSITORY RECTAL EVERY 4 HOURS PRN
Status: DISCONTINUED | OUTPATIENT
Start: 2021-01-01 | End: 2021-01-01 | Stop reason: HOSPADM

## 2021-01-01 RX ORDER — MORPHINE SULFATE 2 MG/ML
0.5 INJECTION, SOLUTION INTRAMUSCULAR; INTRAVENOUS
Status: DISCONTINUED | OUTPATIENT
Start: 2021-01-01 | End: 2021-01-01 | Stop reason: HOSPADM

## 2021-01-01 RX ORDER — ACETAMINOPHEN 650 MG/1
650 SUPPOSITORY RECTAL EVERY 4 HOURS PRN
Status: DISCONTINUED | OUTPATIENT
Start: 2021-01-01 | End: 2021-01-01

## 2021-01-01 RX ORDER — ACETAMINOPHEN 325 MG/1
650 TABLET ORAL EVERY 4 HOURS PRN
Status: DISCONTINUED | OUTPATIENT
Start: 2021-01-01 | End: 2021-01-01

## 2021-01-01 RX ORDER — LINEZOLID 2 MG/ML
600 INJECTION, SOLUTION INTRAVENOUS EVERY 12 HOURS
Status: DISPENSED | OUTPATIENT
Start: 2021-01-01 | End: 2021-01-01

## 2021-01-01 RX ORDER — MORPHINE SULFATE 2 MG/ML
2 INJECTION, SOLUTION INTRAMUSCULAR; INTRAVENOUS
Status: DISCONTINUED | OUTPATIENT
Start: 2021-01-01 | End: 2021-01-01 | Stop reason: HOSPADM

## 2021-01-01 RX ORDER — ONDANSETRON 4 MG/1
4 TABLET, FILM COATED ORAL EVERY 6 HOURS PRN
Status: DISCONTINUED | OUTPATIENT
Start: 2021-01-01 | End: 2021-01-01

## 2021-01-01 RX ORDER — CHLORHEXIDINE GLUCONATE 0.12 MG/ML
15 RINSE ORAL EVERY 12 HOURS SCHEDULED
Status: DISCONTINUED | OUTPATIENT
Start: 2021-01-01 | End: 2021-01-01

## 2021-01-01 RX ORDER — METOCLOPRAMIDE HYDROCHLORIDE 5 MG/ML
5 INJECTION INTRAMUSCULAR; INTRAVENOUS EVERY 8 HOURS SCHEDULED
Status: DISCONTINUED | OUTPATIENT
Start: 2021-01-01 | End: 2021-01-01

## 2021-01-01 RX ORDER — FAMOTIDINE 10 MG/ML
20 INJECTION, SOLUTION INTRAVENOUS EVERY 12 HOURS SCHEDULED
Status: DISCONTINUED | OUTPATIENT
Start: 2021-01-01 | End: 2021-01-01

## 2021-01-01 RX ORDER — CITALOPRAM 20 MG/1
20 TABLET ORAL DAILY
Status: DISCONTINUED | OUTPATIENT
Start: 2021-01-01 | End: 2021-01-01

## 2021-01-01 RX ORDER — SODIUM CHLORIDE 0.9 % (FLUSH) 0.9 %
10 SYRINGE (ML) INJECTION AS NEEDED
Status: DISCONTINUED | OUTPATIENT
Start: 2021-01-01 | End: 2021-01-01

## 2021-01-01 RX ORDER — ALBUTEROL SULFATE 2.5 MG/3ML
2.5 SOLUTION RESPIRATORY (INHALATION)
Status: DISCONTINUED | OUTPATIENT
Start: 2021-01-01 | End: 2021-01-01

## 2021-01-01 RX ORDER — QUETIAPINE FUMARATE 25 MG/1
50 TABLET, FILM COATED ORAL EVERY 12 HOURS SCHEDULED
Status: DISPENSED | OUTPATIENT
Start: 2021-01-01 | End: 2021-01-01

## 2021-01-01 RX ORDER — ATROPINE SULFATE 10 MG/ML
2 SOLUTION/ DROPS OPHTHALMIC
Status: DISCONTINUED | OUTPATIENT
Start: 2021-01-01 | End: 2021-01-01 | Stop reason: HOSPADM

## 2021-01-01 RX ORDER — SACCHAROMYCES BOULARDII 250 MG
250 CAPSULE ORAL 2 TIMES DAILY
Status: DISCONTINUED | OUTPATIENT
Start: 2021-01-01 | End: 2021-01-01

## 2021-01-01 RX ORDER — FUROSEMIDE 10 MG/ML
40 INJECTION INTRAMUSCULAR; INTRAVENOUS
Status: DISPENSED | OUTPATIENT
Start: 2021-01-01 | End: 2021-01-01

## 2021-01-01 RX ORDER — MEDROXYPROGESTERONE ACETATE 10 MG/1
10 TABLET ORAL DAILY
Status: DISCONTINUED | OUTPATIENT
Start: 2021-01-01 | End: 2021-01-01

## 2021-01-01 RX ORDER — ONDANSETRON 2 MG/ML
4 INJECTION INTRAMUSCULAR; INTRAVENOUS EVERY 6 HOURS PRN
Status: DISCONTINUED | OUTPATIENT
Start: 2021-01-01 | End: 2021-01-01 | Stop reason: HOSPADM

## 2021-01-01 RX ORDER — IPRATROPIUM BROMIDE AND ALBUTEROL SULFATE 2.5; .5 MG/3ML; MG/3ML
3 SOLUTION RESPIRATORY (INHALATION)
Status: DISCONTINUED | OUTPATIENT
Start: 2021-01-01 | End: 2021-01-01

## 2021-01-01 RX ORDER — ONDANSETRON 2 MG/ML
4 INJECTION INTRAMUSCULAR; INTRAVENOUS EVERY 6 HOURS PRN
Status: DISCONTINUED | OUTPATIENT
Start: 2021-01-01 | End: 2021-01-01

## 2021-01-01 RX ORDER — FLUOXETINE HYDROCHLORIDE 20 MG/5ML
10 LIQUID ORAL DAILY
Status: DISCONTINUED | OUTPATIENT
Start: 2021-01-01 | End: 2021-01-01 | Stop reason: SDUPTHER

## 2021-01-01 RX ORDER — QUETIAPINE FUMARATE 25 MG/1
50 TABLET, FILM COATED ORAL NIGHTLY
Status: DISCONTINUED | OUTPATIENT
Start: 2021-01-01 | End: 2021-01-01

## 2021-01-01 RX ORDER — MORPHINE SULFATE 2 MG/ML
1 INJECTION, SOLUTION INTRAMUSCULAR; INTRAVENOUS
Status: DISPENSED | OUTPATIENT
Start: 2021-01-01 | End: 2021-01-01

## 2021-01-01 RX ORDER — MORPHINE SULFATE 1 MG/ML
1 INJECTION, SOLUTION INTRAVENOUS CONTINUOUS
Status: DISCONTINUED | OUTPATIENT
Start: 2021-01-01 | End: 2021-01-01 | Stop reason: HOSPADM

## 2021-01-01 RX ORDER — MORPHINE SULFATE 2 MG/ML
1 INJECTION, SOLUTION INTRAMUSCULAR; INTRAVENOUS
Status: DISCONTINUED | OUTPATIENT
Start: 2021-01-01 | End: 2021-01-01

## 2021-01-01 RX ORDER — FUROSEMIDE 40 MG/1
40 TABLET ORAL
Status: DISCONTINUED | OUTPATIENT
Start: 2021-01-01 | End: 2021-01-01

## 2021-01-01 RX ORDER — NICOTINE POLACRILEX 4 MG
15 LOZENGE BUCCAL
Status: DISCONTINUED | OUTPATIENT
Start: 2021-01-01 | End: 2021-01-01

## 2021-01-01 RX ORDER — SODIUM CHLORIDE 0.9 % (FLUSH) 0.9 %
10 SYRINGE (ML) INJECTION EVERY 12 HOURS SCHEDULED
Status: DISCONTINUED | OUTPATIENT
Start: 2021-01-01 | End: 2021-01-01

## 2021-01-01 RX ORDER — LORAZEPAM 2 MG/ML
2 CONCENTRATE ORAL
Status: DISCONTINUED | OUTPATIENT
Start: 2021-01-01 | End: 2021-01-01 | Stop reason: HOSPADM

## 2021-01-01 RX ORDER — ZINC SULFATE 50(220)MG
220 CAPSULE ORAL 2 TIMES DAILY
Status: DISCONTINUED | OUTPATIENT
Start: 2021-01-01 | End: 2021-01-01

## 2021-01-01 RX ORDER — FUROSEMIDE 40 MG/1
40 TABLET ORAL DAILY
Status: DISCONTINUED | OUTPATIENT
Start: 2021-01-01 | End: 2021-01-01

## 2021-01-01 RX ORDER — HYDROXYZINE HYDROCHLORIDE 25 MG/1
25 TABLET, FILM COATED ORAL 3 TIMES DAILY PRN
Status: DISCONTINUED | OUTPATIENT
Start: 2021-01-01 | End: 2021-01-01

## 2021-01-01 RX ORDER — MELATONIN
2000 DAILY
Status: DISCONTINUED | OUTPATIENT
Start: 2021-01-01 | End: 2021-01-01

## 2021-01-01 RX ORDER — FUROSEMIDE 10 MG/ML
20 INJECTION INTRAMUSCULAR; INTRAVENOUS EVERY 6 HOURS
Status: DISPENSED | OUTPATIENT
Start: 2021-01-01 | End: 2021-01-01

## 2021-01-01 RX ORDER — DEXTROSE MONOHYDRATE 25 G/50ML
25 INJECTION, SOLUTION INTRAVENOUS
Status: DISCONTINUED | OUTPATIENT
Start: 2021-01-01 | End: 2021-01-01

## 2021-01-17 PROBLEM — F02.80 DEMENTIA OF FRONTAL LOBE TYPE (HCC): Status: ACTIVE | Noted: 2017-05-21

## 2021-01-17 PROBLEM — G30.9 ALZHEIMER'S DEMENTIA WITH BEHAVIORAL DISTURBANCE (HCC): Status: ACTIVE | Noted: 2020-01-01

## 2021-01-17 PROBLEM — G31.09 DEMENTIA OF FRONTAL LOBE TYPE (HCC): Status: ACTIVE | Noted: 2017-05-21

## 2021-01-17 PROBLEM — G89.4 CHRONIC PAIN DISORDER: Status: ACTIVE | Noted: 2017-10-10

## 2021-01-17 PROBLEM — R26.2 IMPAIRED AMBULATION: Status: ACTIVE | Noted: 2020-01-01

## 2021-01-17 PROBLEM — F31.9 BIPOLAR DISORDER (HCC): Status: ACTIVE | Noted: 2020-01-01

## 2021-01-17 PROBLEM — Z12.11 ENCOUNTER FOR SCREENING FOR MALIGNANT NEOPLASM OF COLON: Status: ACTIVE | Noted: 2017-05-21

## 2021-01-17 PROBLEM — E87.6 HYPOKALEMIA: Status: ACTIVE | Noted: 2020-01-01

## 2021-01-17 PROBLEM — F02.818 ALZHEIMER'S DEMENTIA WITH BEHAVIORAL DISTURBANCE (HCC): Status: ACTIVE | Noted: 2020-01-01

## 2021-01-18 PROBLEM — J96.01 ACUTE HYPOXEMIC RESPIRATORY FAILURE DUE TO COVID-19 (HCC): Status: ACTIVE | Noted: 2021-01-01

## 2021-01-18 PROBLEM — Z93.0 TRACHEOSTOMY DEPENDENCE (HCC): Status: ACTIVE | Noted: 2021-01-01

## 2021-01-18 PROBLEM — Z43.0 ACUTE TRACHEOSTOMY MANAGEMENT (HCC): Status: ACTIVE | Noted: 2021-01-01

## 2021-01-18 PROBLEM — Z99.11 VENTILATOR DEPENDENCE (HCC): Status: ACTIVE | Noted: 2021-01-01

## 2021-01-18 PROBLEM — U07.1 ACUTE HYPOXEMIC RESPIRATORY FAILURE DUE TO COVID-19 (HCC): Status: ACTIVE | Noted: 2021-01-01

## 2021-02-01 NOTE — PROGRESS NOTES
Adult Nutrition  Assessment/PES    Patient Name:  Duke Rowell  YOB: 1950  MRN: [id number]  Admit Date:  1/16/2021    Assessment Date:  2/1/2021    Comments:  Pt cont to receive enteral ntn of Peptamen 1.5 at 55 ml/hr auto flush of 33 ml/hr per pump. Pt received over past three days 99% of est. Kcal needs, 100% of protein needs and 101% of enteral goal volume. No residuals documented. Cont to follow.    Reason for Assessment     Row Name 02/01/21 1146          Reason for Assessment    Reason For Assessment  follow-up protocol;TF/PN         Nutrition/Diet History     Row Name 02/01/21 1146          Nutrition/Diet History    Typical Food/Fluid Intake  Trach to vent. TF infusing at time of RD visit.     Factors Affecting Nutritional Intake  compromised airway         Anthropometrics     Row Name 02/01/21 1147          Usual Body Weight (UBW)    Weight Gain  unintentional     Weight Loss Time Frame  wt gain of 3 lbs over the last week        Body Mass Index (BMI)    BMI Assessment  BMI 18.5-24.9: normal         Labs/Tests/Procedures/Meds     Row Name 02/01/21 1148          Labs/Procedures/Meds    Lab Results Reviewed  reviewed        Medications    Pertinent Medications Reviewed  reviewed     Pertinent Medications Comments  Reglan,pepcid,lasix,SC insulin         Physical Findings     Row Name 02/01/21 1149          Physical Findings    Overall Physical Appearance  on ventilator support     Gastrointestinal  feeding tube;bowel management system;other (see comments) output from FMS 1/31     Tubes  PEG     Skin  pressure injury;non-healing wound(s);other (see comments) Leighton score N/A           Nutrition Prescription Ordered     Row Name 02/01/21 1152          Nutrition Prescription PO    Current PO Diet  NPO        Nutrition Prescription EN    Product  Peptamen 1.5 (Vital 1.5)     TF Delivery Method  Continuous     Continuous TF Goal Rate (mL/hr)  55 mL/hr     Continuous TF Current Rate (mL/hr)  55  mL/hr     Continuous TF Goal Volume (mL)  1210 mL     Continuous TF Current Volume (mL)  1210 mL     Water flush (mL)   33 mL     Water Flush Frequency  Per hour         Evaluation of Received Nutrient/Fluid Intake     Row Name 02/01/21 1159          Nutrient/Fluid Evaluation    Number of Days Evaluated  3 days        Calories Evaluation    Enteral Calories (kcal)  1839     Total Calories (kcal)  1839     Total Calories (kcal/kg)  27.65     % of Kcal Needs  99        Protein Evaluation    Enteral Protein (gm)  81.6     Total Protein (gm)  81.6     Total Protein (gm/kg)  1.23     % of Protein Needs  100        Intake Assessment    Energy/Calorie Requirement Assessment  meeting needs     Protein Requirement Assessment  meeting needs     Fluid Requirement Assessment  meeting needs     Average Calorie Intake (days)  3     Average Protein Intake (days)  3     Tolerance  tolerating        Fluid Intake Evaluation    Enteral (Free Water) Fluid (mL)  924     Free Water Flush Fluid (mL)  922     Total Free Water Intake (mL)  1846 mL     IV Fluid (mL)  40        Recommended Daily Intake Evaluation    RDI  Met        PO Evaluation    % PO Intake  NPO        EN Evaluation    Number of Days EN Intake Evaluated  3 days     EN Average Volume Delivered (mL/day)  1226 mL/day     % Goal Volume   101 %     TF Residual  no resiudals documented     HOB  Greater than or equal to 30 degress         Problem/Interventions:  Problem 1     Row Name 02/01/21 1201          Nutrition Diagnoses Problem 1    Problem 1  Needs Alternate Route     Etiology (related to)  Medical Diagnosis     Infectious Disease  Other (comment) s/p COVID-19     Neurological  Parkinson's;Dementia;Metabolic encephalopathy;Dysphagia     Pulmonary/Critical Care  Acute respiratory failure;Pneumonia;Ventilator     Skin  Pressure injury     Signs/Symptoms (evidenced by)  NPO;EN Intake Delivery     Percent (%) of EN goal  101 %           Intervention Goal     Row Name 02/01/21  1203          Intervention Goal    General  Nutrition support treatment;Reduce/improve symptoms;Meet nutritional needs for age/condition;Disease management/therapy     TF/PN  Maintain TF/PN     Deliver % of Goal  100 %     Deliver % of Estimated Need  100 %     Weight  Maintain weight         Nutrition Intervention     Row Name 02/01/21 1203          Nutrition Intervention    RD/Tech Action  Follow Tx progress;Care plan reviewd         Nutrition Prescription     Row Name 02/01/21 1203          Nutrition Prescription EN    Enteral Prescription  Continue same protocol         Education/Evaluation     Row Name 02/01/21 1203          Education    Education  No discharge needs identified at this time        Monitor/Evaluation    Monitor  Per protocol         Electronically signed by:  Angela Robles MS,RDN,LD  02/01/21 12:07 CST

## 2021-02-01 NOTE — PROGRESS NOTES
PULMONARY AND CRITICAL CARE PROGRESS NOTE - [facility]  Patient: Duke Rowell    1950   Acct# [id number]  02/01/21   08:37 CST  Referring Provider: Maurice Carpenter MD   Chief Complaint: Respiratory failure, mechanical ventilation   Interval history: The patient is resting in bed with trach to vent.  O2 sat 95%, on 40% FiO2 and PEEP 5.  Chest x-ray with right-sided pneumothorax and worsening opacities.No other aggravating or alleviating factors.   Review of Systems: Review of Systems   Unable to perform ROS: Intubated   Physical Exam:  Vital signs: T: 96.7, blood pressure 113/56, pulse 69, respiratory rate 20, end-tidal 45%, saturation 95%      Vent settings: Mode AC/PC, R 14, Pi 15, Ti 0.8, PEEP 5, FiO2 40%  Physical Exam   Constitutional:       General: He is lethargic.      Appearance: He is ill-appearing. He is not toxic-appearing.      Interventions: He is intubated.   HENT:      Head: Normocephalic.      Nose: Nose normal.  Tracheostomy to vent  Eyes:      General: No scleral icterus.  Cardiovascular:      Rate and Rhythm: Normal rate and regular rhythm.   Pulmonary:      Effort: No respiratory distress. He is intubated.      Breath sounds: Decreased breath sounds and rhonchi present.      Comments:  No dyssynchrony , bilateral chest tubes present, secured, attached to atriums.   Abdominal:      General: There is no distension.  Nutrition to PEG tube.     Palpations: Abdomen is soft.   Musculoskeletal:      Right lower leg: No edema.      Left lower leg: No edema.     Bilateral upper extremities- edematous  Skin:     Findings: No rash.   Neurological:      Mental Status: He is nonresponsive.       Motor: No tremor or seizure activity.   Psychiatric:         Speech: He is noncommunicative.         Behavior: Behavior is not agitated.   Results from last 7 days   Lab Units 02/01/21  0350 01/29/21  0343 01/26/21  0642   WBC 10*3/mm3 7.62 6.87 7.26   HEMOGLOBIN g/dL 9.4* 8.9* 9.0*    PLATELETS 10*3/mm3 319 185 143     Results from last 7 days   Lab Units 02/01/21  0350 01/30/21  0435 01/29/21  0343   SODIUM mmol/L 140 138 139   POTASSIUM mmol/L 4.0 4.3 4.2   BUN mg/dL 17 18 14   CREATININE mg/dL 0.23* 0.24* 0.26*     Results from last 7 days   Lab Units 01/27/21  0521 01/25/21  1002   PH, ARTERIAL pH units 7.493* 7.418   PCO2, ARTERIAL mm Hg 56.9* 66.0*   PO2 ART mm Hg 82.6* 134.0*   FIO2 % 40 70   No results found for: BLOODCX, URINECX, RESPCX  Recent films:  Xr Chest 1 View    Result Date: 2/1/2021  Impression: 1.   Right apical pneumothorax. 2.  Worsening bilateral parenchymal opacities..   This report was finalized on 02/01/2021 07:22 by Dr. Mary Quevedo MD.    Xr Chest 1 View    Result Date: 1/31/2021  Impression: 1.   No significant interval change since previous exam.   This report was finalized on 01/31/2021 07:14 by Dr. Mary Quevedo MD.  Films reviewed personally by me.  My interpretation: Tracheostomy intact. Bilateral chest tubes.  Right apical pneumothorax and worsening bilateral opacities.    Pulmonary Assessment:  1. Acute respiratory failure with hypoxia and hypercapnia- Life threatening, pulmonary system failed  2. Dementia  3. Pulmonary infiltrates related to covid and also superinfection  4. Bilateral pneumothorax compensated with chest tubes  5. Hfpef.  possibly decompensated  6. Bipolar disorder   Recommend/plan:   · Continue Mechanical Vent.  No ventilator changes today.  · CT surgery following for bilateral chest tubes.   · Resume suction to right-sided chest tube  · Obtain a stool for C. difficile and lactic acid  · Aricept, Lamictal, and Seroquel for underlying dementia and bipolar  · Bronchodilators-DuoNeb  · Mucolytic-Robitussin  · DVT prophylaxis-Lovenox  · Stress ulcer prophylaxis-pepcid   · CXR daily   · ABG prn   · Antibiotic-completed  · Nutrition per TF   · CBC/BMP every 3rd day   · Zinc and vitamin D  · PT/OT/SLP   · Prognosis guarded    Electronically  signed by AIYANA Reyes, on 2/1/2021, 08:37 CST     Physician substantive portion:  He has a lot of extremely foul-smelling stool.  We will be checking for C. difficile.  I also worry about noninfectious colitis.  Chest x-ray today shows worsening pneumothorax on the right.  We are going to place the right chest tube to suction as we have done with the left side.  He is not responsive to verbal stimulus.  He has a few rhonchi.  Trach site okay.  Overall appears to be doing extremely poorly.  We will follow-up labs with a lactic acid to evaluate for tissue ischemia.  He has a metabolic alkalosis which is chronic.  Anion gap today is 4 which is not suggestive of lactic acidosis superimposed on this and argues against an ischemic basis for the diarrhea    I have seen and examined patient personally, performing a face-to-face diagnostic evaluation with plan of care reviewed and developed with APRN and nursing staff. I have addended and/or modified the above history of present illness, physical examination, and assessment and plan to reflect my findings and impressions. Essential elements of the care plan were discussed with APRN above.  Agree with findings and assessment/plan as documented above.    Electronically signed by Edy Vail MD, on 2/1/2021, 11:03 CST

## 2021-02-01 NOTE — PROGRESS NOTES
[facility] OTOLARYNGOLOGY, HEAD AND NECK SURGERY PROGRESS NOTE   Referring Provider: Maurice Carpenter MD  Reason for Consultation: Trach management  Location: 586/1  Accompanied by: RT  Chief complaint: Trach  Subjective    Interval History:   Since last examined, Duke Rowell has remained mechanically ventilated with a trach in place.  RT notes that she has to place more air in the cough.  He has some inspirational noise with the ventilator.  There is a mild cuff leak.  The patient is not losing his volumes.  RT notes that the patient has been placed back to chest tube suction.  Patient does not respond to my examination.  Patient seen. Chart reviewed.  Review of Systems:   Review of Systems: No change from prior inquiry      Objective    Vital Signs     EXAMINATION:  CONSTITUTIONAL:    well developed  Asthenic, lying in bed, on ventilator and with trach in place, no response on examination     BODY HABITUS:    Very thin     COMMUNICATION:    Not respond to my voice at all     HEAD:     Normocephalic, without obvious abnormality, atraumatic     FACE:    structure normal, no tenderness present, no lesions/masses, no evidence of trauma     HEARING:    Not respond to my voice     EARS:     PINNA:     normal, non-tender, skin intact without lesions      NOSE EXTERNAL:    APPEARANCE: normal, straight, with good projection, no tenderness, no lesions, no tenderness, good nasal support, patent nares     NOSE INTERNAL:      Not examined     ORAL CAVITY:      LIPS:      structure normal, no tenderness on palpation, no lesions, no evidence of trauma, normal mobility and oral competence    TEETH:       missing teeth:  upper and lower      diffusely carious      Repair: Very poor, very few teeth remaining    GUMS:      receding    ORAL MUCOSA:       oral mucosa normal, no mucosal lesions  TONGUE:       Mucosa looks intact, mobility not tested     OROPHARYNX:    Patient bites down not allowing examination     NECK:     thin  Trach in place     LYMPH NODES :    no adenopathy     THYROID:    Not evaluated because of trach      CHEST/RESPIRATORY:    Mechanical ventilation, mild inspiratory leak     CARDIOVASCULAR:    regular rate and rhythm, no murmurs, gallups, no peripheral edema     NEURO/PSYCHIATRIC :      Sedated, on ventilator     TRACHEOSTOMY SITE:    Tracheostomy tube type: Shiley #8 cuffed DIC     Stoma: Appropriately healing, no bleeding     Secretions: Mild, clear     Voice: Not examined   Date placed: 1/14/2021  Date last changed: Never     Physical examination has been copied from prior note.  This is been carefully reviewed and appropriate changes have been made in the documentation.    Results Review:       I have reviewed the patients old records in the chart.  The following results/records were reviewed:  Progress Notes by Suellen Hartman APRN (02/01/2021 09:40)   Progress Notes by Edy Vail MD (02/01/2021 07:35)   Basic Metabolic Panel (02/01/2021 03:50)   CBC & Differential (02/01/2021 03:50)   XR Chest 1 View (01/26/2021 15:09)     Medications, Allergies and Past History Reviewed        Assessment       Acute tracheostomy management (CMS/[facility])    Ventilator dependence (CMS/[facility])    Acute hypoxemic respiratory failure due to COVID-19 (CMS/[facility])         Plan      Patient continues on mechanical ventilation with a stable #8 Shiley cuffed trach.  He will be due for trach change soon.  I will plan for routine trach change later this week.  RT reports no issues with the trach  Continue current trach care  Discussed plan with RT.    Following patient as in-patient. Further recommendations will be made based on serial examinations.    Medications/Orders for this encounter: No new medications ordered.  No New orders written.        Tevin Benoit Jr, MD  02/01/21  17:07 CST

## 2021-02-01 NOTE — PROGRESS NOTES
Jayden Carpenter M.D.  AIYANA Ness        Internal Medicine Progress Note    2/1/2021   10:36 CST    Name:  Duke Rowell  MRN:    [id number]     Acct:     [id number]   Room:  [address] [place] [address] Day: 0     Admit Date: 1/16/2021  1:59 PM  PCP: Provider, No Known    Subjective:     C/C: need for continued vent weaning      Interval History: status: stable. Resting in bed. No family at bedside. Continues with bilateral chest tubes to pleurevacs.  Eyes open spontaneously. Remains off sedation. Tolerating current vent settings (A/C). No progress with vent weaning. Afebrile. CXR shows right apical pneumothorax and pleurevac placed back to suction. Increased malodorous stool today - C. Diff negative.     Review of Systems   Unable to perform ROS: dementia         Medications:     Allergies: No Known Allergies    Current Meds:   Current Facility-Administered Medications:   •  acetaminophen (TYLENOL) tablet 650 mg, 650 mg, Per G Tube, Q4H PRN **OR** acetaminophen (TYLENOL) suppository 650 mg, 650 mg, Rectal, Q4H PRN, Maurice Carpenter MD  •  albuterol (PROVENTIL) nebulizer solution 0.083% 2.5 mg/3mL, 2.5 mg, Nebulization, Q2H PRN, Maurice Carpenter MD  •  chlorhexidine (PERIDEX) 0.12 % solution 15 mL, 15 mL, Mouth/Throat, Q12H, Maurice Carpenter MD  •  cholecalciferol (VITAMIN D3) tablet 2,000 Units, 2,000 Units, Per G Tube, Daily, Maurice Carpenter MD  •  dextrose (D50W) 25 g/ 50mL Intravenous Solution 25 g, 25 g, Intravenous, Q15 Min PRN, Maurice Carpenter MD  •  dextrose (GLUTOSE) oral gel 15 g, 15 g, Oral, Q15 Min PRN, Maurice Carpenter MD  •  donepezil (ARICEPT) tablet 10 mg, 10 mg, Per G Tube, BID, Maurice Carpenter MD  •  enoxaparin (LOVENOX) syringe 30 mg, 30 mg, Subcutaneous, Q12H, Maurice Carpenter MD  •  famotidine (PEPCID) injection 20 mg, 20 mg, Intravenous, Q12H, Alex Meyers MD  •  furosemide (LASIX) tablet 40 mg, 40 mg, Oral, Daily, Luigi,  MD Alex  •  gabapentin (NEURONTIN) 250 MG/5ML solution 100 mg, 100 mg, Per G Tube, Q12H, Maurice Carpenter MD  •  glucagon (human recombinant) (GLUCAGEN DIAGNOSTIC) injection 1 mg, 1 mg, Subcutaneous, Q15 Min PRN, Maurice Carpenter MD  •  guaiFENesin (ROBITUSSIN) 100 MG/5ML oral solution 200 mg, 200 mg, Per G Tube, Q6H, Hailey Mckeon APRN  •  hydrOXYzine (ATARAX) tablet 25 mg, 25 mg, Per G Tube, TID PRN, Maurice Carpenter MD  •  insulin lispro (humaLOG, ADMELOG) injection 0-9 Units, 0-9 Units, Subcutaneous, Q6H, Maurice Carpenter MD  •  ipratropium-albuterol (DUO-NEB) nebulizer solution 3 mL, 3 mL, Nebulization, Q6H - RT, Edy Vail MD  •  lamoTRIgine (LaMICtal) tablet 150 mg, 150 mg, Per G Tube, Q12H, Maurice Carpenter MD  •  medroxyPROGESTERone (PROVERA) tablet 10 mg, 10 mg, Per G Tube, Daily, Maurice Carpenter MD  •  metoclopramide (REGLAN) injection 5 mg, 5 mg, Intravenous, Q8H, Maurice Carpenter MD  •  midodrine (PROAMATINE) tablet 10 mg, 10 mg, Per G Tube, TID AC, Maurice Carpenter MD  •  Morphine sulfate (PF) injection 1 mg, 1 mg, Intravenous, Q2H PRN, Maurice Carpenter MD  •  ondansetron (ZOFRAN) tablet 4 mg, 4 mg, Per G Tube, Q6H PRN **OR** ondansetron (ZOFRAN) injection 4 mg, 4 mg, Intravenous, Q6H PRN, Maurice Carpenter MD  •  potassium & sodium phosphates (PHOS-NAK) oral packet, 1 packet, Per G Tube, BID AC, Maurice Carpenter MD  •  QUEtiapine (SEROquel) tablet 50 mg, 50 mg, Per G Tube, Nightly, aMurice Carpenter MD  •  saccharomyces boulardii (FLORASTOR) capsule 250 mg, 250 mg, Per G Tube, BID, Maurice Carpenter MD  •  sodium chloride 0.9 % flush 10 mL, 10 mL, Intravenous, Q12H, Maurice Carpenter MD  •  sodium chloride 0.9 % flush 10 mL, 10 mL, Intravenous, PRN, Maurice Carpenter MD  •  zinc sulfate (ZINCATE) capsule 220 mg, 220 mg, Per G Tube, BID, Maurice Carpenter MD    Data:     Code Status:    DNR  "      No family history on file.    Social History     Socioeconomic History   • Marital status: Unknown     Spouse name: Not on file   • Number of children: Not on file   • Years of education: Not on file   • Highest education level: Not on file       Vitals:  Ht 167.6 cm (66\")   Wt 66.5 kg (146 lb 11.2 oz)   BMI 23.68 kg/m²     T 96.7 HR 69 RR 20 /56 SPO2  97% (vent)        I/O (24Hr):  No intake or output data in the 24 hours ending 02/01/21 1036    Labs and imaging:      Recent Results (from the past 12 hour(s))   POC Glucose Once    Collection Time: 02/01/21 12:39 AM    Specimen: Blood   Result Value Ref Range    Glucose 131 (H) 70 - 130 mg/dL   Basic Metabolic Panel    Collection Time: 02/01/21  3:50 AM    Specimen: Blood   Result Value Ref Range    Glucose 121 (H) 65 - 99 mg/dL    BUN 17 8 - 23 mg/dL    Creatinine 0.23 (L) 0.76 - 1.27 mg/dL    Sodium 140 136 - 145 mmol/L    Potassium 4.0 3.5 - 5.2 mmol/L    Chloride 95 (L) 98 - 107 mmol/L    CO2 41.0 (H) 22.0 - 29.0 mmol/L    Calcium 8.8 8.6 - 10.5 mg/dL    eGFR  African Amer >150 >60 mL/min/1.73    eGFR Non African Amer >150 >60 mL/min/1.73    BUN/Creatinine Ratio 73.9 (H) 7.0 - 25.0    Anion Gap 4.0 (L) 5.0 - 15.0 mmol/L   CBC Auto Differential    Collection Time: 02/01/21  3:50 AM    Specimen: Blood   Result Value Ref Range    WBC 7.62 3.40 - 10.80 10*3/mm3    RBC 3.23 (L) 4.14 - 5.80 10*6/mm3    Hemoglobin 9.4 (L) 13.0 - 17.7 g/dL    Hematocrit 28.8 (L) 37.5 - 51.0 %    MCV 89.2 79.0 - 97.0 fL    MCH 29.1 26.6 - 33.0 pg    MCHC 32.6 31.5 - 35.7 g/dL    RDW 18.3 (H) 12.3 - 15.4 %    RDW-SD 58.8 (H) 37.0 - 54.0 fl    MPV 10.3 6.0 - 12.0 fL    Platelets 319 140 - 450 10*3/mm3    Neutrophil % 64.5 42.7 - 76.0 %    Lymphocyte % 11.9 (L) 19.6 - 45.3 %    Monocyte % 7.7 5.0 - 12.0 %    Eosinophil % 12.7 (H) 0.3 - 6.2 %    Basophil % 0.8 0.0 - 1.5 %    Immature Grans % 2.4 (H) 0.0 - 0.5 %    Neutrophils, Absolute 4.91 1.70 - 7.00 10*3/mm3    Lymphocytes, " Absolute 0.91 0.70 - 3.10 10*3/mm3    Monocytes, Absolute 0.59 0.10 - 0.90 10*3/mm3    Eosinophils, Absolute 0.97 (H) 0.00 - 0.40 10*3/mm3    Basophils, Absolute 0.06 0.00 - 0.20 10*3/mm3    Immature Grans, Absolute 0.18 (H) 0.00 - 0.05 10*3/mm3    nRBC 0.0 0.0 - 0.2 /100 WBC   BNP    Collection Time: 02/01/21  3:50 AM    Specimen: Blood   Result Value Ref Range    proBNP 339.1 0.0 - 900.0 pg/mL   POC Glucose Once    Collection Time: 02/01/21  5:31 AM    Specimen: Blood   Result Value Ref Range    Glucose 124 70 - 130 mg/dL   Lactic Acid, Plasma    Collection Time: 02/01/21  8:11 AM    Specimen: Blood   Result Value Ref Range    Lactate 0.8 0.5 - 2.0 mmol/L   Clostridium Difficile Toxin, PCR - Stool, Per Rectum    Collection Time: 02/01/21  8:58 AM    Specimen: Per Rectum; Stool   Result Value Ref Range    C. Difficile Toxins by PCR Negative Negative           Physical Examination:        Physical Exam  Vitals signs and nursing note reviewed.   Constitutional:       General: He is awake.      Appearance: He is ill-appearing.   HENT:      Head: Normocephalic.      Right Ear: External ear normal.      Left Ear: External ear normal.      Nose: Nose normal.      Mouth/Throat:      Mouth: Mucous membranes are dry.      Pharynx: No oropharyngeal exudate or posterior oropharyngeal erythema.   Eyes:      General: Scleral icterus present.      Pupils: Pupils are equal, round, and reactive to light.   Neck:      Vascular: No carotid bruit.      Comments: Trach to vent  Cardiovascular:      Rate and Rhythm: Normal rate and regular rhythm.      Pulses: Normal pulses.      Heart sounds: Murmur present.   Pulmonary:      Effort: No respiratory distress.      Breath sounds: Decreased breath sounds and rhonchi present.      Comments: Bilateral chest tubes to pleurevac  Abdominal:      General: Abdomen is flat. Bowel sounds are normal. There is no distension.      Palpations: There is no mass.      Tenderness: There is no abdominal  tenderness.      Comments: g tube   Musculoskeletal:         General: No swelling.      Comments: Generalized weakness  Contractures   Lymphadenopathy:      Cervical: No cervical adenopathy.   Skin:     General: Skin is cool and dry.      Capillary Refill: Capillary refill takes 2 to 3 seconds.      Coloration: Skin is pale.   Neurological:      Motor: Weakness present.      Comments: Eyes open spontaneously  Does not follow commands  Nonverbal   Psychiatric:         Behavior: Behavior normal.           Assessment:             Acute tracheostomy management (CMS/[facility])    Ventilator dependence (CMS/[facility])    Acute hypoxemic respiratory failure due to COVID-19 (CMS/[facility])    No past medical history on file.     Plan:        1. Acute hypoxic/hypercapneic respiratory failure  2. S/p COVID pneumonia  3. Bilateral pneumothoraces   4. ARDS  5. Parkinson's Disease  6. Dementia  7. Bipolar disorder   8. Dysphagia  9. DM type 2 with hyperglycemia  10. MRSA pneumonia    Continue current treatment. Monitor counts. Increase activity. Labs Thursday. Continue vent weaning under direction of pulmonology. Chest tube maintenance per CT surgery. Maintain patient safety.  Watch off antibiotics. Glycemic control. GI panel. Guarded prognosis with limited rehab potential.       Electronically signed by AIYANA Pinto on 2/1/2021 at 10:36 CST

## 2021-02-02 NOTE — PROGRESS NOTES
PULMONARY AND CRITICAL CARE PROGRESS NOTE - [facility]  Patient: Duke Rowell    1950   Acct# [id number]  02/02/21   07:49 CST  Referring Provider: Maurice Carpenter MD   Chief Complaint: Respiratory failure, mechanical ventilation   Interval history: The patient is resting in bed with trach to vent.  O2 sat 95%, on 40% FiO2 and PEEP 5.  Chest x-ray with mild right apical pneumo, improved aeration of the lungs.  The patient does have a small cuff leak.  He is due for trach change this week per ENT.  No other aggravating or alleviating factors.   Review of Systems: Review of Systems   Unable to perform ROS: Intubated   Physical Exam:  Vital signs: T: 97.8, blood pressure 111/69, pulse 71, respiratory rate 22, end-tidal 52%, saturation 95%      Vent settings: Mode AC/PC, R 14, Pi 15, Ti 0.8, PEEP 5, FiO2 40%  Physical Exam   Constitutional:       General: He is lethargic.      Appearance: He is ill-appearing. He is not toxic-appearing.      Interventions: He is intubated.   HENT:      Head: Normocephalic.      Nose: Nose normal.  Tracheostomy to vent  Eyes:      General: No scleral icterus.  Cardiovascular:      Rate and Rhythm: Normal rate and regular rhythm.   Pulmonary:      Effort: No respiratory distress. He is intubated.      Breath sounds: Decreased breath sounds and rhonchi present.      Comments:  No dyssynchrony , bilateral chest tubes present, secured, attached to atriums.   Abdominal:      General: There is no distension.  Nutrition to PEG tube.     Palpations: Abdomen is soft.   Musculoskeletal:      Right lower leg: No edema.      Left lower leg: No edema.     Bilateral upper extremities- edematous  Skin:     Findings: No rash.   Neurological:      Mental Status: He is nonresponsive.       Motor: No tremor or seizure activity.   Psychiatric:         Speech: He is noncommunicative.         Behavior: Behavior is not agitated.   Results from last 7 days   Lab Units  02/01/21  0350 01/29/21  0343   WBC 10*3/mm3 7.62 6.87   HEMOGLOBIN g/dL 9.4* 8.9*   PLATELETS 10*3/mm3 319 185     Results from last 7 days   Lab Units 02/01/21  0350 01/30/21  0435 01/29/21  0343   SODIUM mmol/L 140 138 139   POTASSIUM mmol/L 4.0 4.3 4.2   BUN mg/dL 17 18 14   CREATININE mg/dL 0.23* 0.24* 0.26*     Results from last 7 days   Lab Units 01/27/21  0521   PH, ARTERIAL pH units 7.493*   PCO2, ARTERIAL mm Hg 56.9*   PO2 ART mm Hg 82.6*   FIO2 % 40   No results found for: BLOODCX, URINECX, RESPCX  Recent films:  Xr Chest 1 View    Result Date: 2/2/2021  Impression: 1.   Barely visible right apical pneumothorax. 2.  Improved aeration of the lungs..   This report was finalized on 02/02/2021 07:24 by Dr. Mary Quevedo MD.    Xr Chest 1 View    Result Date: 2/1/2021  Impression: 1.   Right apical pneumothorax. 2.  Worsening bilateral parenchymal opacities..   This report was finalized on 02/01/2021 07:22 by Dr. Mary Quevedo MD.    Films reviewed personally by me.  My interpretation: Tracheostomy intact. Bilateral chest tubes.  Right apical pneumothorax and worsening bilateral opacities.    Pulmonary Assessment:  1. Acute respiratory failure with hypoxia and hypercapnia- Life threatening, pulmonary system failed  2. Dementia  3. Pulmonary infiltrates related to covid and also superinfection  4. Bilateral pneumothorax compensated with chest tubes  5. Hfpef.  possibly decompensated  6. Bipolar disorder     Recommend/plan:   · Continue Mechanical Vent.  No ventilator changes today.  · CT surgery following for bilateral chest tubes   · Aricept, Lamictal, and Seroquel for underlying dementia and bipolar  · Bronchodilators-DuoNeb  · Mucolytic-Robitussin  · DVT prophylaxis-Lovenox  · Stress ulcer prophylaxis-pepcid   · CXR daily   · ABG prn   · Antibiotic-completed  · Nutrition per TF   · CBC/BMP every 3rd day   · Zinc and vitamin D  · PT/OT/SLP   · Prognosis guarded  · Will likely need long term vent facility  placement     Electronically signed by AIYANA Reyes, on 2/2/2021, 07:49 CST    Physician substantive portion:  He still has some diarrhea and still has the fecal management system.  Stool studies were all negative.  He remains on the ventilator.  He is not responsive to me.  His respond to voice.  Breath sounds are coarse.  He has bilateral chest tubes in place to suction.  He has an intermittent air leak through his trach cuff.  RT indicates they have to add 1 to 2 mL of air every day.  Chest x-ray does show good expansion of both lungs today.  He has infiltrates consistent with prior Covid.  The abdomen is soft.  He appears to be stable as we can get him for now.  He is not ready to get off the ventilator.  He is not alert.  He is on his psychiatric medications.  Anticipate he will need longer term care.  Continue pressure control ventilation.    I have seen and examined patient personally, performing a face-to-face diagnostic evaluation with plan of care reviewed and developed with APRN and nursing staff. I have addended and/or modified the above history of present illness, physical examination, and assessment and plan to reflect my findings and impressions. Essential elements of the care plan were discussed with APRN above.  Agree with findings and assessment/plan as documented above.    Electronically signed by Edy Vail MD, on 2/2/2021, 11:19 CST

## 2021-02-02 NOTE — PROGRESS NOTES
Jayden Carpenter M.D.  AIYANA Ness        Internal Medicine Progress Note    2/2/2021   13:45 CST    Name:  Duke Rowell  MRN:    [id number]     Acct:     [id number]   Room:  [address] [place] [address] Day: 0     Admit Date: 1/16/2021  1:59 PM  PCP: Provider, No Known    Subjective:     C/C: need for continued vent weaning      Interval History: status: stable. Resting in bed. No family at bedside. Continues with bilateral chest tubes to pleurevacs.  Eyes open spontaneously. Remains off sedation. Tolerating current vent settings (A/C). No progress with vent weaning. Afebrile. Improvement in right apical pneumothorax. GI panel/C. Diff negative.     Review of Systems   Unable to perform ROS: dementia         Medications:     Allergies: No Known Allergies    Current Meds:   Current Facility-Administered Medications:   •  acetaminophen (TYLENOL) tablet 650 mg, 650 mg, Per G Tube, Q4H PRN **OR** acetaminophen (TYLENOL) suppository 650 mg, 650 mg, Rectal, Q4H PRN, Maurice Carpenter MD  •  albuterol (PROVENTIL) nebulizer solution 0.083% 2.5 mg/3mL, 2.5 mg, Nebulization, Q2H PRN, Maurice Carpenter MD  •  chlorhexidine (PERIDEX) 0.12 % solution 15 mL, 15 mL, Mouth/Throat, Q12H, Maurice Carpenter MD  •  cholecalciferol (VITAMIN D3) tablet 2,000 Units, 2,000 Units, Per G Tube, Daily, Maurice Carpenter MD  •  dextrose (D50W) 25 g/ 50mL Intravenous Solution 25 g, 25 g, Intravenous, Q15 Min PRN, Maurice Carpenter MD  •  dextrose (GLUTOSE) oral gel 15 g, 15 g, Oral, Q15 Min PRN, Maurice Carpenter MD  •  donepezil (ARICEPT) tablet 10 mg, 10 mg, Per G Tube, BID, Maurice Carpenter MD  •  enoxaparin (LOVENOX) syringe 30 mg, 30 mg, Subcutaneous, Q12H, Maurice Carpenter MD  •  famotidine (PEPCID) injection 20 mg, 20 mg, Intravenous, Q12H, Alex Meyers MD  •  furosemide (LASIX) tablet 40 mg, 40 mg, Oral, Daily, Alex Meyers MD  •  gabapentin (NEURONTIN) 250 MG/5ML solution  100 mg, 100 mg, Per G Tube, Q12H, Maurice Carpenter MD  •  glucagon (human recombinant) (GLUCAGEN DIAGNOSTIC) injection 1 mg, 1 mg, Subcutaneous, Q15 Min PRN, Maurice Carpenter MD  •  guaiFENesin (ROBITUSSIN) 100 MG/5ML oral solution 200 mg, 200 mg, Per G Tube, Q6H, Hailey Mckeon APRN  •  hydrOXYzine (ATARAX) tablet 25 mg, 25 mg, Per G Tube, TID PRN, Maurice Carpenter MD  •  insulin lispro (humaLOG, ADMELOG) injection 0-9 Units, 0-9 Units, Subcutaneous, Q6H, Maurice Carpenter MD  •  ipratropium-albuterol (DUO-NEB) nebulizer solution 3 mL, 3 mL, Nebulization, Q6H - RT, Edy Vail MD  •  lamoTRIgine (LaMICtal) tablet 150 mg, 150 mg, Per G Tube, Q12H, Maurice Carpenter MD  •  medroxyPROGESTERone (PROVERA) tablet 10 mg, 10 mg, Per G Tube, Daily, Maurice Carpenter MD  •  metoclopramide (REGLAN) injection 5 mg, 5 mg, Intravenous, Q8H, Maurice Carpenter MD  •  midodrine (PROAMATINE) tablet 10 mg, 10 mg, Per G Tube, TID AC, Maurice Carpenter MD  •  Morphine sulfate (PF) injection 1 mg, 1 mg, Intravenous, Q2H PRN, Maurice Carpenter MD  •  ondansetron (ZOFRAN) tablet 4 mg, 4 mg, Per G Tube, Q6H PRN **OR** ondansetron (ZOFRAN) injection 4 mg, 4 mg, Intravenous, Q6H PRN, Maurice Carpenter MD  •  potassium & sodium phosphates (PHOS-NAK) oral packet, 1 packet, Per G Tube, BID KARLEY, Maurice Carpenter MD  •  QUEtiapine (SEROquel) tablet 50 mg, 50 mg, Per G Tube, Nightly, Maurice Carpenter MD  •  saccharomyces boulardii (FLORASTOR) capsule 250 mg, 250 mg, Per G Tube, BID, Maurice Carpenter MD  •  sodium chloride 0.9 % flush 10 mL, 10 mL, Intravenous, Q12H, Maurice Carpenter MD  •  sodium chloride 0.9 % flush 10 mL, 10 mL, Intravenous, PRN, Maurice Carpenter MD  •  zinc sulfate (ZINCATE) capsule 220 mg, 220 mg, Per G Tube, BID, Maurice Carpenter MD    Data:     Code Status:    DNR       No family history on file.    Social History  "    Socioeconomic History   • Marital status: Unknown     Spouse name: Not on file   • Number of children: Not on file   • Years of education: Not on file   • Highest education level: Not on file       Vitals:  Ht 167.6 cm (66\")   Wt 66.5 kg (146 lb 11.2 oz)   BMI 23.68 kg/m²     T 97.8 HR 71 RR 22 /69 SPO2  97% (vent)        I/O (24Hr):  No intake or output data in the 24 hours ending 02/02/21 1345    Labs and imaging:      Recent Results (from the past 12 hour(s))   POC Glucose Once    Collection Time: 02/02/21  5:15 AM    Specimen: Blood   Result Value Ref Range    Glucose 123 70 - 130 mg/dL   POC Glucose Once    Collection Time: 02/02/21 11:35 AM    Specimen: Blood   Result Value Ref Range    Glucose 126 70 - 130 mg/dL           Physical Examination:        Physical Exam  Vitals signs and nursing note reviewed.   Constitutional:       General: He is awake.      Appearance: He is ill-appearing.   HENT:      Head: Normocephalic.      Right Ear: External ear normal.      Left Ear: External ear normal.      Nose: Nose normal.      Mouth/Throat:      Mouth: Mucous membranes are dry.      Pharynx: No oropharyngeal exudate or posterior oropharyngeal erythema.   Eyes:      General: Scleral icterus present.      Pupils: Pupils are equal, round, and reactive to light.   Neck:      Vascular: No carotid bruit.      Comments: Trach to vent  Cardiovascular:      Rate and Rhythm: Normal rate and regular rhythm.      Pulses: Normal pulses.      Heart sounds: Murmur present.   Pulmonary:      Effort: No respiratory distress.      Breath sounds: Decreased breath sounds and rhonchi present.      Comments: Bilateral chest tubes to pleurevac  Abdominal:      General: Abdomen is flat. Bowel sounds are normal. There is no distension.      Palpations: There is no mass.      Tenderness: There is no abdominal tenderness.      Comments: g tube   Musculoskeletal:         General: No swelling.      Comments: Generalized " weakness  Contractures   Lymphadenopathy:      Cervical: No cervical adenopathy.   Skin:     General: Skin is cool and dry.      Capillary Refill: Capillary refill takes 2 to 3 seconds.      Coloration: Skin is pale.   Neurological:      Motor: Weakness present.      Comments: Eyes open spontaneously  Does not follow commands  Nonverbal   Psychiatric:         Behavior: Behavior normal.           Assessment:             Acute tracheostomy management (CMS/[facility])    Ventilator dependence (CMS/[facility])    Acute hypoxemic respiratory failure due to COVID-19 (CMS/[facility])    No past medical history on file.     Plan:        1. Acute hypoxic/hypercapneic respiratory failure  2. S/p COVID pneumonia  3. Bilateral pneumothoraces   4. ARDS  5. Parkinson's Disease  6. Dementia  7. Bipolar disorder   8. Dysphagia  9. DM type 2 with hyperglycemia  10. MRSA pneumonia    Continue current treatment. Monitor counts. Increase activity. Labs Thursday. Continue vent weaning under direction of pulmonology. Chest tube maintenance per CT surgery. Maintain patient safety.  Watch off antibiotics. Glycemic control. GI panel. Guarded prognosis with limited rehab potential.       Electronically signed by AIYANA Pinto on 2/2/2021 at 13:45 CST   I have discussed the care of Duke Rowell, including pertinent history and exam findings, with the nurse practitioner and I agree with the summary of care.        Electronically signed by Maurice Carpenter MD on 2/2/2021 at 13:57 CST

## 2021-02-03 NOTE — PROGRESS NOTES
PULMONARY AND CRITICAL CARE PROGRESS NOTE - [facility]  Patient: Duke Rowell    1950   Acct# [id number]  02/03/21   08:51 CST  Referring Provider: Maurice Carpenter MD   Chief Complaint: Respiratory failure, mechanical ventilation   Interval history: The patient is resting in bed with trach to vent, pressure control, 40% FiO2 with a sat of 97%. Chest x-ray remain stable.  The patient does have a small cuff leak.  He is due for trach change this week per ENT.  His eyes are open and he has to be encouraged to be interactive but does appropriately shake his head yes or no.  No other aggravating or alleviating factors.   Review of Systems: Review of Systems   Unable to perform ROS: Intubated   Physical Exam:  Vital signs: T: 97.6, blood pressure 103/49, pulse 88, respiratory rate 16, end-tidal 48%, saturation 100%      Vent settings: Mode AC/PC, R 14, Pi 15, Ti 0.8, PEEP 5, FiO2 40%  Physical Exam   Constitutional:       General: He is lethargic.      Appearance: He is ill-appearing. He is not toxic-appearing.      Interventions: He is intubated.   HENT:      Head: Normocephalic.      Nose: Nose normal.  Tracheostomy to vent  Eyes:      General: No scleral icterus.  Cardiovascular:      Rate and Rhythm: Normal rate and regular rhythm.   Pulmonary:      Effort: No respiratory distress. He is intubated.      Breath sounds: Decreased breath sounds and rhonchi present.      Comments:  No dyssynchrony , bilateral chest tubes present, secured, attached to atriums.   Abdominal:      General: There is no distension.  Nutrition to PEG tube.     Palpations: Abdomen is soft.   Musculoskeletal:      Right lower leg: No edema.      Left lower leg: No edema.     Bilateral upper extremities- edematous  Skin:     Findings: No rash.   Neurological:      Mental Status: He is nonresponsive.       Motor: No tremor or seizure activity.   Psychiatric:         Speech: He is noncommunicative.          Behavior: Behavior is not agitated.   He appears depressed.  Results from last 7 days   Lab Units 02/01/21  0350 01/29/21  0343   WBC 10*3/mm3 7.62 6.87   HEMOGLOBIN g/dL 9.4* 8.9*   PLATELETS 10*3/mm3 319 185     Results from last 7 days   Lab Units 02/01/21  0350 01/30/21  0435 01/29/21  0343   SODIUM mmol/L 140 138 139   POTASSIUM mmol/L 4.0 4.3 4.2   BUN mg/dL 17 18 14   CREATININE mg/dL 0.23* 0.24* 0.26*       No results found for: BLOODCX, URINECX, RESPCX  Recent films:  Xr Chest 1 View    Result Date: 2/3/2021  Impression: 1.   No significant interval change since previous exam.   This report was finalized on 02/03/2021 07:20 by Dr. Mary Quevedo MD.    Xr Chest 1 View    Result Date: 2/2/2021  Impression: 1.   Barely visible right apical pneumothorax. 2.  Improved aeration of the lungs..   This report was finalized on 02/02/2021 07:24 by Dr. Mary Quevedo MD.    Films reviewed personally by me.  My interpretation: Tracheostomy intact. Bilateral chest tubes.  Stable from previous.    Pulmonary Assessment:  1. Acute respiratory failure with hypoxia and hypercapnia- Life threatening, pulmonary system failed  2. Dementia  3. Pulmonary infiltrates related to covid and also superinfection  4. Bilateral pneumothorax compensated with chest tubes  5. Hfpef.  possibly decompensated  6. Bipolar disorder  7. Depression     Recommend/plan:   · Continue Mechanical Vent.  No ventilator changes today.  · Start Celexa 20 mg daily via PEG tube.  · CT surgery following for bilateral chest tubes   · Aricept, Lamictal, and Seroquel for underlying dementia and bipolar  · Bronchodilators-DuoNeb  · Mucolytic-Robitussin  · DVT prophylaxis-Lovenox  · Stress ulcer prophylaxis-pepcid   · CXR daily   · ABG prn   · Antibiotic-completed  · Nutrition per TF   · CBC/BMP every 3rd day   · Zinc and vitamin D  · PT/OT/SLP   · Prognosis guarded  · Will likely need long term vent facility placement     Electronically signed by Garrett  AIYANA Gann, on 2/3/2021, 08:51 CST     ATTESTATION OF CLINICAL NOTE:  I have reviewed the notes, assessments, and/or procedures performed by AIYANA Kirby, I concur with her/his documentation of Duke Rowell.    He was seen in the LTAC unit.  He was opening eyes but not responding much and I could not obtain much history from the patient.  He has bilateral chest tube in place and remains on ventilator.  He is also back on his psychiatric medications and getting tube feed he has no other acute events overnight.  Cardiothoracic surgery is managing his chest tubes and ENT is managing his tracheostomy.  I talked to the RN and RT.    Physical examination patient is an elderly  male lying in the bed on ventilator tracheostomy opening eyes but not respond to verbal commands.  HEENT: Atraumatic normocephalic.  Neck: Supple no mass nuclear venous distention no lymphadenopathy.  Heart: Sounds normal rate and rhythm regular no murmur.  Lungs: Bibasilar crackles and diffuse wheezes.  Abdomen: Soft nondistended nontender.  Extremities: No edema normal pulses normal color.  Neurologic: Grossly unchanged.  Skin: No breakdown.  Psych: Could not be assessed.  Patient remains encephalopathic.    Continue on full mechanical ventilation.  Routine respiratory care and pulmonary toilet.  No ventilator changes was done today.  Did PEEP and FiO2 had spontaneous breathing trial when tolerated.  Keep oxygen saturation more than 92%.  Continue bronchodilator treatment.  Management of chest tubes per CT surgery.  He had bilateral pneumothorax.  He is also known to have dementia and depression with bipolar disorder.  Continue Aricept and Lamictal and continue psych medications.  Added on Celexa for depression.  Continue nutritional support with tube feeding..  Patient completed antibiotic coverage.  PT/OT/SLP to continue.  Continue DVT and stress ulcer prophylaxis.  Patient may need long-term ventilator  facility.  Repeat labs and imaging studies from time to time.  Pulmonary team will continue following him and make further recommendations.    I have seen and examined patient personally, performing a face-to-face diagnostic evaluation with plan of care reviewed and developed with APRN and nursing staff. I have addended and/or modified the above history of present illness, physical examination, and assessment and plan to reflect my findings and impressions. Essential elements of the care plan were discussed with APRN above.  Agree with findings and assessment/plan as documented above.    Alex Meyers MD  Pulmonologist/Intensivist  2/3/2021 19:15 CST

## 2021-02-03 NOTE — PROGRESS NOTES
Jayden Carpenter M.D.  AIYANA Ness        Internal Medicine Progress Note    2/3/2021   10:47 CST    Name:  Duke Rowell  MRN:    [id number]     Acct:     [id number]   Room:  [address] [place] [address] Day: 0     Admit Date: 1/16/2021  1:59 PM  PCP: Provider, No Known    Subjective:     C/C: need for continued vent weaning      Interval History: status: stable. Resting in bed. No family at bedside. Continues with bilateral chest tubes to pleurevacs.  Eyes open spontaneously. Remains off sedation. Tolerating current vent settings (A/C). No progress with vent weaning. Afebrile.     Review of Systems   Unable to perform ROS: dementia         Medications:     Allergies: No Known Allergies    Current Meds:   Current Facility-Administered Medications:   •  acetaminophen (TYLENOL) tablet 650 mg, 650 mg, Per G Tube, Q4H PRN **OR** acetaminophen (TYLENOL) suppository 650 mg, 650 mg, Rectal, Q4H PRN, Maurice Carpenter MD  •  albuterol (PROVENTIL) nebulizer solution 0.083% 2.5 mg/3mL, 2.5 mg, Nebulization, Q2H PRN, Maurice Carpenter MD  •  chlorhexidine (PERIDEX) 0.12 % solution 15 mL, 15 mL, Mouth/Throat, Q12H, Maurice Carpenter MD  •  cholecalciferol (VITAMIN D3) tablet 2,000 Units, 2,000 Units, Per G Tube, Daily, Maurice Carpenter MD  •  citalopram (CeleXA) tablet 20 mg, 20 mg, Per G Tube, Daily, Garrett Gann, AIYANA  •  dextrose (D50W) 25 g/ 50mL Intravenous Solution 25 g, 25 g, Intravenous, Q15 Min PRN, Maurice Carpenter MD  •  dextrose (GLUTOSE) oral gel 15 g, 15 g, Oral, Q15 Min PRN, Maurice Carpenter MD  •  donepezil (ARICEPT) tablet 10 mg, 10 mg, Per G Tube, BID, Maurice Carpenter MD  •  enoxaparin (LOVENOX) syringe 30 mg, 30 mg, Subcutaneous, Q12H, Maurice Carpenter MD  •  famotidine (PEPCID) injection 20 mg, 20 mg, Intravenous, Q12H, Alex Meyers MD  •  furosemide (LASIX) tablet 40 mg, 40 mg, Oral, Daily, Alex Meyers MD  •  gabapentin  (NEURONTIN) 250 MG/5ML solution 100 mg, 100 mg, Per G Tube, Q12H, Maurice Carpenter MD  •  glucagon (human recombinant) (GLUCAGEN DIAGNOSTIC) injection 1 mg, 1 mg, Subcutaneous, Q15 Min PRN, Maurice Carpenter MD  •  guaiFENesin (ROBITUSSIN) 100 MG/5ML oral solution 200 mg, 200 mg, Per G Tube, Q6H, Hailey Mckeon APRN  •  hydrOXYzine (ATARAX) tablet 25 mg, 25 mg, Per G Tube, TID PRN, Maurice Carpenter MD  •  insulin lispro (humaLOG, ADMELOG) injection 0-9 Units, 0-9 Units, Subcutaneous, Q6H, Maurice Carpenter MD  •  ipratropium-albuterol (DUO-NEB) nebulizer solution 3 mL, 3 mL, Nebulization, Q6H - RT, Edy Vail MD  •  lamoTRIgine (LaMICtal) tablet 150 mg, 150 mg, Per G Tube, Q12H, Maurice Carpenter MD  •  medroxyPROGESTERone (PROVERA) tablet 10 mg, 10 mg, Per G Tube, Daily, Maurice Carpenter MD  •  metoclopramide (REGLAN) injection 5 mg, 5 mg, Intravenous, Q8H, Maurice Carpenter MD  •  midodrine (PROAMATINE) tablet 10 mg, 10 mg, Per G Tube, TID AC, Maurice Carpenter MD  •  Morphine sulfate (PF) injection 1 mg, 1 mg, Intravenous, Q2H PRN, Maurice Carpenter MD  •  ondansetron (ZOFRAN) tablet 4 mg, 4 mg, Per G Tube, Q6H PRN **OR** ondansetron (ZOFRAN) injection 4 mg, 4 mg, Intravenous, Q6H PRN, Maurice Carpenter MD  •  potassium & sodium phosphates (PHOS-NAK) oral packet, 1 packet, Per G Tube, BID AC, Maurice Carpenter MD  •  QUEtiapine (SEROquel) tablet 50 mg, 50 mg, Per G Tube, Nightly, Maurice Carpenter MD  •  saccharomyces boulardii (FLORASTOR) capsule 250 mg, 250 mg, Per G Tube, BID, Maurice Carpenter MD  •  sodium chloride 0.9 % flush 10 mL, 10 mL, Intravenous, Q12H, Maurice Carpenter MD  •  sodium chloride 0.9 % flush 10 mL, 10 mL, Intravenous, PRN, Maurice Carpenter MD  •  zinc sulfate (ZINCATE) capsule 220 mg, 220 mg, Per G Tube, BID, Maurice Carpenter MD    Data:     Code Status:    DNR       No family history on  "file.    Social History     Socioeconomic History   • Marital status: Unknown     Spouse name: Not on file   • Number of children: Not on file   • Years of education: Not on file   • Highest education level: Not on file       Vitals:  Ht 167.6 cm (66\")   Wt 66.5 kg (146 lb 11.2 oz)   BMI 23.68 kg/m²     T 97.6 HR 88 RR 16 /69 SPO2  100% (vent)        I/O (24Hr):  No intake or output data in the 24 hours ending 02/03/21 1047    Labs and imaging:      Recent Results (from the past 12 hour(s))   POC Glucose Once    Collection Time: 02/02/21 11:29 PM    Specimen: Blood   Result Value Ref Range    Glucose 124 70 - 130 mg/dL   POC Glucose Once    Collection Time: 02/03/21  5:14 AM    Specimen: Blood   Result Value Ref Range    Glucose 123 70 - 130 mg/dL           Physical Examination:        Physical Exam  Vitals signs and nursing note reviewed.   Constitutional:       General: He is awake.      Appearance: He is ill-appearing.   HENT:      Head: Normocephalic.      Right Ear: External ear normal.      Left Ear: External ear normal.      Nose: Nose normal.      Mouth/Throat:      Mouth: Mucous membranes are dry.      Pharynx: No oropharyngeal exudate or posterior oropharyngeal erythema.   Eyes:      General: Scleral icterus present.      Pupils: Pupils are equal, round, and reactive to light.   Neck:      Vascular: No carotid bruit.      Comments: Trach to vent  Cardiovascular:      Rate and Rhythm: Normal rate and regular rhythm.      Pulses: Normal pulses.      Heart sounds: Murmur present.   Pulmonary:      Effort: No respiratory distress.      Breath sounds: Decreased breath sounds and rhonchi present.      Comments: Bilateral chest tubes to pleurevac  Abdominal:      General: Abdomen is flat. Bowel sounds are normal. There is no distension.      Palpations: There is no mass.      Tenderness: There is no abdominal tenderness.      Comments: g tube   Musculoskeletal:         General: No swelling.      Comments: " Generalized weakness  Contractures   Lymphadenopathy:      Cervical: No cervical adenopathy.   Skin:     General: Skin is cool and dry.      Capillary Refill: Capillary refill takes 2 to 3 seconds.      Coloration: Skin is pale.   Neurological:      Motor: Weakness present.      Comments: Eyes open spontaneously  Does not follow commands  Nonverbal   Psychiatric:         Behavior: Behavior normal.           Assessment:             Acute tracheostomy management (CMS/[facility])    Ventilator dependence (CMS/[facility])    Acute hypoxemic respiratory failure due to COVID-19 (CMS/[facility])    No past medical history on file.     Plan:        1. Acute hypoxic/hypercapneic respiratory failure  2. S/p COVID pneumonia  3. Bilateral pneumothoraces   4. ARDS  5. Parkinson's Disease  6. Dementia  7. Bipolar disorder   8. Dysphagia  9. DM type 2 with hyperglycemia  10. MRSA pneumonia    Continue current treatment. Monitor counts. Increase activity. Labs in am. Continue vent weaning under direction of pulmonology. Chest tube maintenance per CT surgery. Maintain patient safety.  Watch off antibiotics. Glycemic control.  Guarded prognosis with limited rehab potential. Discharge planning.       Electronically signed by AIYANA Pinto on 2/3/2021 at 10:47 CST

## 2021-02-04 NOTE — PROGRESS NOTES
Jayden Carpenter M.D.  AIYANA Ness        Internal Medicine Progress Note    2/4/2021   11:01 CST    Name:  Duke Rowell  MRN:    [id number]     Acct:     [id number]   Room:  [address] [place] [address] Day: 0     Admit Date: 1/16/2021  1:59 PM  PCP: Provider, No Known    Subjective:     C/C: need for continued vent weaning      Interval History: status: stable. Resting in bed. No family at bedside. Continues with bilateral chest tubes to pleurevacs.  Eyes open spontaneously. Remains off sedation. Tolerating current vent settings (A/C). No progress with vent weaning. Afebrile. Counts stable. CXR improved.     Review of Systems   Unable to perform ROS: dementia         Medications:     Allergies: No Known Allergies    Current Meds:   Current Facility-Administered Medications:   •  acetaminophen (TYLENOL) tablet 650 mg, 650 mg, Per G Tube, Q4H PRN **OR** acetaminophen (TYLENOL) suppository 650 mg, 650 mg, Rectal, Q4H PRN, Maurice Carpenter MD  •  albuterol (PROVENTIL) nebulizer solution 0.083% 2.5 mg/3mL, 2.5 mg, Nebulization, Q2H PRN, Maurice Carpenter MD  •  chlorhexidine (PERIDEX) 0.12 % solution 15 mL, 15 mL, Mouth/Throat, Q12H, Maurice Carpenter MD  •  cholecalciferol (VITAMIN D3) tablet 2,000 Units, 2,000 Units, Per G Tube, Daily, Maurice Carpenter MD  •  citalopram (CeleXA) tablet 20 mg, 20 mg, Per G Tube, Daily, Garrett Gann, AIYANA  •  dextrose (D50W) 25 g/ 50mL Intravenous Solution 25 g, 25 g, Intravenous, Q15 Min PRN, Maurice Carpenter MD  •  dextrose (GLUTOSE) oral gel 15 g, 15 g, Oral, Q15 Min PRN, Maurice Carpenter MD  •  donepezil (ARICEPT) tablet 10 mg, 10 mg, Per G Tube, BID, Maurice Carpenter MD  •  enoxaparin (LOVENOX) syringe 30 mg, 30 mg, Subcutaneous, Q12H, Maurice Carpenter MD  •  famotidine (PEPCID) injection 20 mg, 20 mg, Intravenous, Q12H, Sensarma, MD Alex  •  furosemide (LASIX) tablet 40 mg, 40 mg, Oral, Daily, Sensarma,  MD Alex  •  gabapentin (NEURONTIN) 250 MG/5ML solution 100 mg, 100 mg, Per G Tube, Q12H, Maurice Carpenter MD  •  glucagon (human recombinant) (GLUCAGEN DIAGNOSTIC) injection 1 mg, 1 mg, Subcutaneous, Q15 Min PRN, Maurice Carpenter MD  •  guaiFENesin (ROBITUSSIN) 100 MG/5ML oral solution 200 mg, 200 mg, Per G Tube, Q6H, Hailey Mckeon APRN  •  hydrOXYzine (ATARAX) tablet 25 mg, 25 mg, Per G Tube, TID PRN, Maurice Carpenter MD  •  insulin lispro (humaLOG, ADMELOG) injection 0-9 Units, 0-9 Units, Subcutaneous, Q6H, Maurice Carpenter MD  •  ipratropium-albuterol (DUO-NEB) nebulizer solution 3 mL, 3 mL, Nebulization, Q6H - RT, Edy Vail MD  •  lamoTRIgine (LaMICtal) tablet 150 mg, 150 mg, Per G Tube, Q12H, Maurice Carpenter MD  •  medroxyPROGESTERone (PROVERA) tablet 10 mg, 10 mg, Per G Tube, Daily, Maurice Carpenter MD  •  metoclopramide (REGLAN) injection 5 mg, 5 mg, Intravenous, Q8H, Maurice Carpenter MD  •  midodrine (PROAMATINE) tablet 10 mg, 10 mg, Per G Tube, TID AC, Maurice Carpenter MD  •  Morphine sulfate (PF) injection 1 mg, 1 mg, Intravenous, Q2H PRN, Maurice Carpenter MD  •  ondansetron (ZOFRAN) tablet 4 mg, 4 mg, Per G Tube, Q6H PRN **OR** ondansetron (ZOFRAN) injection 4 mg, 4 mg, Intravenous, Q6H PRN, Maurice Carpenter MD  •  potassium & sodium phosphates (PHOS-NAK) oral packet, 1 packet, Per G Tube, BID AC, Maurice Carpenter MD  •  QUEtiapine (SEROquel) tablet 50 mg, 50 mg, Per G Tube, Nightly, Maurice Carpenter MD  •  saccharomyces boulardii (FLORASTOR) capsule 250 mg, 250 mg, Per G Tube, BID, Maurice Carpenter MD  •  sodium chloride 0.9 % flush 10 mL, 10 mL, Intravenous, Q12H, Maurice Carpenter MD  •  sodium chloride 0.9 % flush 10 mL, 10 mL, Intravenous, PRN, Maurice Carpenter MD  •  zinc sulfate (ZINCATE) capsule 220 mg, 220 mg, Per G Tube, BID, Maurice Carpenter MD    Data:     Code Status:    DNR  "      No family history on file.    Social History     Socioeconomic History   • Marital status: Unknown     Spouse name: Not on file   • Number of children: Not on file   • Years of education: Not on file   • Highest education level: Not on file       Vitals:  Ht 167.6 cm (66\")   Wt 66.5 kg (146 lb 11.2 oz)   BMI 23.68 kg/m²     T 98.6 HR 70 RR 21 /54 SPO2  93% (vent)        I/O (24Hr):  No intake or output data in the 24 hours ending 02/04/21 1101    Labs and imaging:      Recent Results (from the past 12 hour(s))   POC Glucose Once    Collection Time: 02/04/21 12:36 AM    Specimen: Blood   Result Value Ref Range    Glucose 101 70 - 130 mg/dL   Basic Metabolic Panel    Collection Time: 02/04/21  4:52 AM    Specimen: Blood   Result Value Ref Range    Glucose 143 (H) 65 - 99 mg/dL    BUN 18 8 - 23 mg/dL    Creatinine 0.28 (L) 0.76 - 1.27 mg/dL    Sodium 139 136 - 145 mmol/L    Potassium 3.8 3.5 - 5.2 mmol/L    Chloride 94 (L) 98 - 107 mmol/L    CO2 40.0 (H) 22.0 - 29.0 mmol/L    Calcium 9.1 8.6 - 10.5 mg/dL    eGFR  African Amer >150 >60 mL/min/1.73    eGFR Non African Amer >150 >60 mL/min/1.73    BUN/Creatinine Ratio 64.3 (H) 7.0 - 25.0    Anion Gap 5.0 5.0 - 15.0 mmol/L   CBC Auto Differential    Collection Time: 02/04/21  4:52 AM    Specimen: Blood   Result Value Ref Range    WBC 10.45 3.40 - 10.80 10*3/mm3    RBC 3.23 (L) 4.14 - 5.80 10*6/mm3    Hemoglobin 9.4 (L) 13.0 - 17.7 g/dL    Hematocrit 29.4 (L) 37.5 - 51.0 %    MCV 91.0 79.0 - 97.0 fL    MCH 29.1 26.6 - 33.0 pg    MCHC 32.0 31.5 - 35.7 g/dL    RDW 18.3 (H) 12.3 - 15.4 %    RDW-SD 59.5 (H) 37.0 - 54.0 fl    MPV 10.6 6.0 - 12.0 fL    Platelets 261 140 - 450 10*3/mm3    Neutrophil % 76.2 (H) 42.7 - 76.0 %    Lymphocyte % 7.5 (L) 19.6 - 45.3 %    Monocyte % 7.4 5.0 - 12.0 %    Eosinophil % 6.7 (H) 0.3 - 6.2 %    Basophil % 0.8 0.0 - 1.5 %    Immature Grans % 1.4 (H) 0.0 - 0.5 %    Neutrophils, Absolute 7.97 (H) 1.70 - 7.00 10*3/mm3    Lymphocytes, " Absolute 0.78 0.70 - 3.10 10*3/mm3    Monocytes, Absolute 0.77 0.10 - 0.90 10*3/mm3    Eosinophils, Absolute 0.70 (H) 0.00 - 0.40 10*3/mm3    Basophils, Absolute 0.08 0.00 - 0.20 10*3/mm3    Immature Grans, Absolute 0.15 (H) 0.00 - 0.05 10*3/mm3    nRBC 0.0 0.0 - 0.2 /100 WBC   BNP    Collection Time: 02/04/21  4:52 AM    Specimen: Blood   Result Value Ref Range    proBNP 250.8 0.0 - 900.0 pg/mL   POC Glucose Once    Collection Time: 02/04/21  6:01 AM    Specimen: Blood   Result Value Ref Range    Glucose 150 (H) 70 - 130 mg/dL           Physical Examination:        Physical Exam  Vitals signs and nursing note reviewed.   Constitutional:       General: He is awake.      Appearance: He is ill-appearing.   HENT:      Head: Normocephalic.      Right Ear: External ear normal.      Left Ear: External ear normal.      Nose: Nose normal.      Mouth/Throat:      Mouth: Mucous membranes are dry.      Pharynx: No oropharyngeal exudate or posterior oropharyngeal erythema.   Eyes:      General: Scleral icterus present.      Pupils: Pupils are equal, round, and reactive to light.   Neck:      Vascular: No carotid bruit.      Comments: Trach to vent  Cardiovascular:      Rate and Rhythm: Normal rate and regular rhythm.      Pulses: Normal pulses.      Heart sounds: Murmur present.   Pulmonary:      Effort: No respiratory distress.      Breath sounds: Decreased breath sounds and rhonchi present.      Comments: Bilateral chest tubes to pleurevac  Abdominal:      General: Abdomen is flat. Bowel sounds are normal. There is no distension.      Palpations: There is no mass.      Tenderness: There is no abdominal tenderness.      Comments: g tube   Musculoskeletal:         General: No swelling.      Comments: Generalized weakness  Contractures   Lymphadenopathy:      Cervical: No cervical adenopathy.   Skin:     General: Skin is cool and dry.      Capillary Refill: Capillary refill takes 2 to 3 seconds.      Coloration: Skin is pale.    Neurological:      Motor: Weakness present.      Comments: Eyes open spontaneously  Does not follow commands  Nonverbal   Psychiatric:         Behavior: Behavior normal.           Assessment:             Acute tracheostomy management (CMS/[facility])    Ventilator dependence (CMS/[facility])    Acute hypoxemic respiratory failure due to COVID-19 (CMS/[facility])    No past medical history on file.     Plan:        1. Acute hypoxic/hypercapneic respiratory failure  2. S/p COVID pneumonia  3. Bilateral pneumothoraces   4. ARDS  5. Parkinson's Disease  6. Dementia  7. Bipolar disorder   8. Dysphagia  9. DM type 2 with hyperglycemia  10. MRSA pneumonia    Continue current treatment. Monitor counts. Increase activity. Labs Monday. Continue vent weaning under direction of pulmonology. Chest tube maintenance per CT surgery. Maintain patient safety. Watch off antibiotics. Glycemic control.  Guarded prognosis with limited rehab potential. Discharge planning.       Electronically signed by AIYANA Pinto on 2/4/2021 at 11:01 CST

## 2021-02-04 NOTE — PROGRESS NOTES
"LTACH Fall Assessment Note    Duke Rowell is a 70 y.o.male  [Ht: 167.6 cm (66\"); Wt: 66.5 kg (146 lb 11.2 oz)] admitted 1/16/2021  1:59 PM.    Current medications associated with an increased risk for fall include:  Citalopram  Donepezil  Furosemide  Gabapentin  Humalog  Metoclopramide  Midodrine  Quetiapine  Hydroxyzine  Morphine  Ondansetron    MANPREET Conway [facility]  02/04/2115:31 CST         "

## 2021-02-04 NOTE — PROGRESS NOTES
PULMONARY AND CRITICAL CARE PROGRESS NOTE - [facility]  Patient: Duke Rowell    1950   Acct# [id number]  02/04/21   08:18 CST  Referring Provider: Maurice Carpenter MD   Chief Complaint: Respiratory failure, mechanical ventilation   Interval history: The patient is resting in bed with trach to vent, pressure control, 50% FiO2 with a sat of 93%.  Bilateral chest tubes are intact.  The patient shakes his head no to questions.  No other aggravating or alleviating factors.   Review of Systems: Review of Systems   Unable to perform ROS: Intubated   Physical Exam:  Vital signs: T: 98.6 blood pressure 106/54, pulse 70, respiratory rate 21, end-tidal 51%, saturation 93%      Vent settings: Mode AC/PC, R 14, Pi 15, Ti 0.8, PEEP 5, FiO2 50%  Physical Exam   Constitutional:       General: He is lethargic.      Appearance: He is ill-appearing. He is not toxic-appearing.      Interventions: He is intubated.   HENT:      Head: Normocephalic.      Nose: Nose normal.  Tracheostomy to vent  Eyes:      General: No scleral icterus.  Cardiovascular:      Rate and Rhythm: Normal rate and regular rhythm.   Pulmonary:      Effort: No respiratory distress. He is intubated.      Breath sounds: Decreased breath sounds and rhonchi present.      Comments:  No dyssynchrony , bilateral chest tubes present, secured, attached to atriums.   Abdominal:      General: There is no distension.  Nutrition to PEG tube.     Palpations: Abdomen is soft.   Musculoskeletal:      Right lower leg: No edema.      Left lower leg: No edema.     Bilateral upper extremities- edematous  Skin:     Findings: No rash.   Neurological:      Mental Status: He shakes his head yes/no to simple questions.       Motor: No tremor or seizure activity.   Psychiatric:         Speech: He is noncommunicative 2' trach        Behavior: Behavior is not agitated.  Results from last 7 days   Lab Units 02/04/21  0452 02/01/21  0350 01/29/21  0343   WBC  10*3/mm3 10.45 7.62 6.87   HEMOGLOBIN g/dL 9.4* 9.4* 8.9*   PLATELETS 10*3/mm3 261 319 185     Results from last 7 days   Lab Units 02/04/21  0452 02/01/21  0350 01/30/21  0435   SODIUM mmol/L 139 140 138   POTASSIUM mmol/L 3.8 4.0 4.3   BUN mg/dL 18 17 18   CREATININE mg/dL 0.28* 0.23* 0.24*       No results found for: BLOODCX, URINECX, RESPCX  Recent films:  Xr Chest 1 View    Result Date: 2/4/2021  Impression: 1.   No measurable pneumothorax. 2.  Improved aeration of the lungs, better seen in the left hemithorax..   This report was finalized on 02/04/2021 07:35 by Dr. Mary Quevedo MD.    Xr Chest 1 View    Result Date: 2/3/2021  Impression: 1.   No significant interval change since previous exam.   This report was finalized on 02/03/2021 07:20 by Dr. Mary Quevedo MD.    Films reviewed personally by me.  My interpretation: Tracheostomy intact. Bilateral chest tubes.  Stable from previous.    Pulmonary Assessment:  1. Acute respiratory failure with hypoxia and hypercapnia- Life threatening, pulmonary system failed  2. Dementia  3. Pulmonary infiltrates related to covid and also superinfection  4. Bilateral pneumothorax compensated with chest tubes  5. Hfpef.  possibly decompensated  6. Bipolar disorder  7. Depression     Recommend/plan:   · Continue Mechanical Vent.  No ventilator changes today.  · Celexa 20 mg daily via PEG tube.  · CT surgery following for bilateral chest tubes   · Aricept, Lamictal, and Seroquel for underlying dementia and bipolar  · Bronchodilators-DuoNeb  · Mucolytic-Robitussin  · DVT prophylaxis-Lovenox  · Stress ulcer prophylaxis-pepcid   · CXR daily   · ABG prn   · Antibiotic-completed  · Nutrition per TF   · CBC/BMP every 3rd day   · Zinc and vitamin D  · PT/OT/SLP   · Prognosis guarded  · Will likely need long term vent facility placement     Electronically signed by AIYANA Reyes, on 2/4/2021, 08:18 CST

## 2021-02-04 NOTE — PROGRESS NOTES
PULMONARY AND CRITICAL CARE PROGRESS NOTE - [facility]  Patient: Duke Rowell    1950   Acct# [id number]  02/04/21   08:25 CST  Referring Provider: Maurice Carpenter MD   Chief Complaint: Respiratory failure, mechanical ventilation   Interval history: The patient is resting in bed with trach to vent, pressure control, 50% FiO2 with a sat of 93%.  Bilateral chest tubes are intact.  Patient nods head to simple questions.  No other aggravating or alleviating factors.     Review of Systems: Review of Systems   Unable to perform ROS: Intubated   Physical Exam:  Vital signs: T: 98.6, blood pressure 106/54, pulse 70, respiratory rate 21, end-tidal 51%, saturation 93%      Vent settings: Mode AC/PC, R 14, Pi 15, Ti 0.8, PEEP 5, FiO2 50%  Physical Exam   Constitutional:       General: He is lethargic.      Appearance: He is ill-appearing. He is not toxic-appearing.      Interventions: He is intubated.   HENT:      Head: Normocephalic.      Nose: Nose normal.  Tracheostomy to vent  Eyes:      General: No scleral icterus.  Cardiovascular:      Rate and Rhythm: Normal rate and regular rhythm.   Pulmonary:      Effort: No respiratory distress. He is intubated.      Breath sounds: Decreased breath sounds and rhonchi present.      Comments:  No dyssynchrony , bilateral chest tubes present, secured, attached to atriums.   Abdominal:      General: There is no distension.  Nutrition to PEG tube.     Palpations: Abdomen is soft.   Musculoskeletal:      Right lower leg: No edema.      Left lower leg: No edema.     Bilateral upper extremities- edematous  Skin:     Findings: No rash.   Neurological:      Mental Status: He nods head yes/no to simple questions.       Motor: No tremor or seizure activity.   Psychiatric:         Speech: He is noncommunicative.         Behavior: Behavior is not agitated.   Results from last 7 days   Lab Units 02/04/21  0452 02/01/21  0350 01/29/21  0343   WBC 10*3/mm3 10.45  7.62 6.87   HEMOGLOBIN g/dL 9.4* 9.4* 8.9*   PLATELETS 10*3/mm3 261 319 185     Results from last 7 days   Lab Units 02/04/21  0452 02/01/21  0350 01/30/21  0435   SODIUM mmol/L 139 140 138   POTASSIUM mmol/L 3.8 4.0 4.3   BUN mg/dL 18 17 18   CREATININE mg/dL 0.28* 0.23* 0.24*       No results found for: BLOODCX, URINECX, RESPCX  Recent films:  Xr Chest 1 View    Result Date: 2/4/2021  Impression: 1.   No measurable pneumothorax. 2.  Improved aeration of the lungs, better seen in the left hemithorax..   This report was finalized on 02/04/2021 07:35 by Dr. Mary Quevedo MD.    Xr Chest 1 View    Result Date: 2/3/2021  Impression: 1.   No significant interval change since previous exam.   This report was finalized on 02/03/2021 07:20 by Dr. Mary Quevedo MD.    Films reviewed personally by me.  My interpretation: Stable from previous.    Pulmonary Assessment:  1. Acute respiratory failure with hypoxia and hypercapnia- Life threatening, pulmonary system failed  2. Dementia  3. Pulmonary infiltrates related to covid and also superinfection  4. Bilateral pneumothorax compensated with chest tubes  5. Hfpef.  possibly decompensated  6. Bipolar disorder  7. Depression     Recommend/plan:   · Continue Mechanical Vent.  No ventilator changes today.  · Celexa 20 mg daily via PEG tube.  · CT surgery following for bilateral chest tubes   · Aricept, Lamictal, and Seroquel for underlying dementia and bipolar  · Bronchodilators-DuoNeb  · Mucolytic-Robitussin  · DVT prophylaxis-Lovenox  · Stress ulcer prophylaxis-pepcid   · CXR daily   · ABG prn   · Antibiotic-completed  · Nutrition per TF   · CBC/BMP every 3rd day   · Zinc and vitamin D  · PT/OT/SLP   · Prognosis guarded  · Will likely need long term vent facility placement     Electronically signed by AIYANA Reyes, on 2/4/2021, 08:25 CST     ATTESTATION OF CLINICAL NOTE:  I have reviewed the notes, assessments, and/or procedures performed by Genna Oswald,  AIYANA, I concur with her/his documentation of Duke Rowell.    Was seen in the LTAC unit for a follow-up visit.  He remains encephalopathic and unresponsive although he opens eyes but does not follow commands.  He has bilateral chest tubes and remains on full ventilator support with pressure control.  Cardiothoracic surgery is following the chest tube.  Psychiatric medications and has history of underlying dementia.  He is getting tube feeding.  Remains afebrile and had no other acute events overnight.  No airleak noted in the chest tubes.      Physical examination patient is an elderly  male lying in the bed on ventilator tracheostomy opening eyes but not respond to verbal commands.  HEENT: Atraumatic normocephalic.  Neck: Supple no mass nuclear venous distention no lymphadenopathy.  Tracheostomy tube in place. Heart: Sounds normal rate and rhythm regular no murmur.  Lungs: Bibasilar crackles and diffuse wheezes.  Lateral chest tubes with pleural VAC noted. Abdomen: Soft nondistended nontender.  Tube noted. Extremities: No edema normal pulses normal color.  Neurologic: Grossly unchanged.  Skin: No breakdown.  Psych: Could not be assessed.  Patient remains encephalopathic.     Continue on full mechanical ventilation.  Routine respiratory care and pulmonary toilet.  To new current ventilator settings and titrate PEEP and FiO2 as tolerated to keep oxygen more than 92%.  We will try spontaneous breathing trial when tolerated.  Keep oxygen saturation more than 92%.  Continue bronchodilator treatment.  Management of chest tubes per CT surgery.  He had bilateral pneumothorax.  He is also known to have dementia and depression with bipolar disorder.  Continue Aricept and Lamictal and continue psych medications.  Added on Celexa for depression.  Continue nutritional support with tube feeding..  Patient completed antibiotic coverage.  PT/OT/SLP to continue.  Continue DVT and stress ulcer prophylaxis.  Patient may  need long-term ventilator facility.  Overall prognosis guarded due to underlying psychiatric problem and dementia. Repeat labs and imaging studies from time to time.  Pulmonary team will continue following him and make further recommendations.    I have seen and examined patient personally, performing a face-to-face diagnostic evaluation with plan of care reviewed and developed with APRN and nursing staff. I have addended and/or modified the above history of present illness, physical examination, and assessment and plan to reflect my findings and impressions. Essential elements of the care plan were discussed with APRN above.  Agree with findings and assessment/plan as documented above.    Alex Meyers MD  Pulmonologist/Intensivist  2/4/2021 17:33 CST

## 2021-02-04 NOTE — PROGRESS NOTES
"    [facility] Cardiothoracic Surgery  PROGRESS NOTE   CC: B/L PTX, Chest tube management    Subjective:  Poor progress with vent wean.  No airleak on chest tubes.  HDS    ROS: UTO due to intubation    Objective:      Ht 167.6 cm (66\")   Wt 66.5 kg (146 lb 11.2 oz)   BMI 23.68 kg/m²     No intake or output data in the 24 hours ending 02/04/21 1448    CT Outpt: minimal serous b/l, no air leak    PE:  There were no vitals filed for this visit.  GENERAL: NAD, resting comfortably, normal color  CARDIOVASCULAR: regular, regular rate, sinus  PULMONARY: Normal bilateral breath sounds, no labored breathing, on vent with trach  ABDOMEN: soft, nt/nd  EXTREMITIES: mild peripheral edema, normal pulses, normal ROM        Lab Results (last 72 hours)     Procedure Component Value Units Date/Time    POC Glucose Once [[id number]]  (Abnormal) Collected: 02/04/21 1245    Specimen: Blood Updated: 02/04/21 1258     Glucose 143 mg/dL      Comment: : LAMARMICHAELA Hernandez ID: [id number]       Basic Metabolic Panel [[id number]]  (Abnormal) Collected: 02/04/21 0452    Specimen: Blood Updated: 02/04/21 0631     Glucose 143 mg/dL      BUN 18 mg/dL      Creatinine 0.28 mg/dL      Sodium 139 mmol/L      Potassium 3.8 mmol/L      Chloride 94 mmol/L      CO2 40.0 mmol/L      Calcium 9.1 mg/dL      eGFR  African Amer >150 mL/min/1.73      eGFR Non African Amer >150 mL/min/1.73      BUN/Creatinine Ratio 64.3     Anion Gap 5.0 mmol/L     Narrative:      GFR Normal >60  Chronic Kidney Disease <60  Kidney Failure <15      BNP [[id number]]  (Normal) Collected: 02/04/21 0452    Specimen: Blood Updated: 02/04/21 0631     proBNP 250.8 pg/mL     Narrative:      Among patients with dyspnea, NT-proBNP is highly sensitive for the detection of acute congestive heart failure. In addition NT-proBNP of <300 pg/ml effectively rules out acute congestive heart failure with 99% negative predictive value.    Results may be falsely " decreased if patient taking Biotin.      POC Glucose Once [[id number]]  (Abnormal) Collected: 02/04/21 0601    Specimen: Blood Updated: 02/04/21 0612     Glucose 150 mg/dL      Comment: : FIORELLA SalgadoMeter ID: [id number]       CBC & Differential [[id number]]  (Abnormal) Collected: 02/04/21 0452    Specimen: Blood Updated: 02/04/21 0604    Narrative:      The following orders were created for panel order CBC & Differential.  Procedure                               Abnormality         Status                     ---------                               -----------         ------                     CBC Auto Differential[[id number]]        Abnormal            Final result                 Please view results for these tests on the individual orders.    CBC Auto Differential [[id number]]  (Abnormal) Collected: 02/04/21 0452    Specimen: Blood Updated: 02/04/21 0604     WBC 10.45 10*3/mm3      RBC 3.23 10*6/mm3      Hemoglobin 9.4 g/dL      Hematocrit 29.4 %      MCV 91.0 fL      MCH 29.1 pg      MCHC 32.0 g/dL      RDW 18.3 %      RDW-SD 59.5 fl      MPV 10.6 fL      Platelets 261 10*3/mm3      Neutrophil % 76.2 %      Lymphocyte % 7.5 %      Monocyte % 7.4 %      Eosinophil % 6.7 %      Basophil % 0.8 %      Immature Grans % 1.4 %      Neutrophils, Absolute 7.97 10*3/mm3      Lymphocytes, Absolute 0.78 10*3/mm3      Monocytes, Absolute 0.77 10*3/mm3      Eosinophils, Absolute 0.70 10*3/mm3      Basophils, Absolute 0.08 10*3/mm3      Immature Grans, Absolute 0.15 10*3/mm3      nRBC 0.0 /100 WBC     POC Glucose Once [[id number]]  (Normal) Collected: 02/04/21 0036    Specimen: Blood Updated: 02/04/21 0048     Glucose 101 mg/dL      Comment: : FIORELLA SalgadoMeter ID: [id number]       POC Glucose Once [[id number]]  (Abnormal) Collected: 02/03/21 1842    Specimen: Blood Updated: 02/03/21 1901     Glucose 147 mg/dL      Comment: : MALLDAY2 Allday Ernie SchaefferMeter ID: [id number]       POC Glucose Once  [[id number]]  (Normal) Collected: 02/03/21 1205    Specimen: Blood Updated: 02/03/21 1220     Glucose 103 mg/dL      Comment: : ADONAY2 Guidosenia SchaefferMeter ID: [id number]       POC Glucose Once [[id number]]  (Normal) Collected: 02/03/21 0514    Specimen: Blood Updated: 02/03/21 0528     Glucose 123 mg/dL      Comment: : FANOTShady hardwickbalwinder CarlaMeter ID: [id number]       POC Glucose Once [[id number]]  (Normal) Collected: 02/02/21 2329    Specimen: Blood Updated: 02/02/21 2342     Glucose 124 mg/dL      Comment: : JUANCARLOS VargasseaMeter ID: [id number]       POC Glucose Once [[id number]]  (Normal) Collected: 02/02/21 1652    Specimen: Blood Updated: 02/02/21 1706     Glucose 129 mg/dL      Comment: : SURAJ Elizabeth CarolynMeter ID: [id number]       POC Glucose Once [[id number]]  (Normal) Collected: 02/02/21 1135    Specimen: Blood Updated: 02/02/21 1147     Glucose 126 mg/dL      Comment: : SURAJ Gtzt CarolynMeter ID: [id number]       POC Glucose Once [[id number]]  (Normal) Collected: 02/02/21 0515    Specimen: Blood Updated: 02/02/21 0532     Glucose 123 mg/dL      Comment: : ERLINDA balwinder CarlaMeter ID: [id number]       POC Glucose Once [[id number]]  (Normal) Collected: 02/01/21 2357    Specimen: Blood Updated: 02/02/21 0009     Glucose 118 mg/dL      Comment: : JOECINDY hardwickrott CarlaMeter ID: [id number]       POC Glucose Once [[id number]]  (Normal) Collected: 02/01/21 1722    Specimen: Blood Updated: 02/01/21 1738     Glucose 127 mg/dL      Comment: : YASMINANDREIA2 Clara CarolynMeter ID: [id number]                 CXR: improved left aeration, no ptx b/l, chest tubes remain in place in stable position    Assessment/Plan     Mr. Rowell is a 70-year-old male with COVID-19 pneumonia bilateral pneumothoraxes and critical illness.  He has had bilateral pneumothorax but chest tubes are in place on both sides.   No air leak now.    Clamp left chest tube, if no  ptx tmr on CXR will remove, likely same with right chest tube next week      Christiano Martinez M.D.  Cardiothoracic Surgeon

## 2021-02-05 NOTE — PROGRESS NOTES
"    [facility] Cardiothoracic Surgery  PROGRESS NOTE   CC: Respiratory failure, chest tube management    Subjective:  Overall no changes, has been hemodynamically stable, vent settings are the same, tolerated left chest tube clamping without any pneumothorax or change in chest x-ray.    ROS: Unable to obtain due to intubation    Objective:      Ht 167.6 cm (66\")   Wt 66.5 kg (146 lb 11.2 oz)   BMI 23.68 kg/m²     No intake or output data in the 24 hours ending 02/05/21 1557    CT Outpt: Minimal serosanguineous, no air leak bilateral    PE:  There were no vitals filed for this visit.  GENERAL: NAD, resting comfortably, normal color, he is noninteractive  CARDIOVASCULAR: regular, regular rate, sinus  PULMONARY: Normal bilateral breath sounds, no labored breathing  ABDOMEN: soft, nt/nd  EXTREMITIES: mild peripheral edema, normal pulses, normal ROM        Lab Results (last 72 hours)     Procedure Component Value Units Date/Time    POC Glucose Once [[id number]]  (Abnormal) Collected: 02/05/21 1209    Specimen: Blood Updated: 02/05/21 1220     Glucose 156 mg/dL      Comment: : VINH OliverosoleMeter ID: [id number]       POC Glucose Once [[id number]]  (Abnormal) Collected: 02/05/21 0546    Specimen: Blood Updated: 02/05/21 0558     Glucose 136 mg/dL      Comment: : LOVELY LawsonieMeter ID: [id number]       POC Glucose Once [[id number]]  (Normal) Collected: 02/05/21 0036    Specimen: Blood Updated: 02/05/21 0047     Glucose 98 mg/dL      Comment: : KAITLYNN Velasquez AprilMeter ID: [id number]       POC Glucose Once [[id number]]  (Abnormal) Collected: 02/04/21 1806    Specimen: Blood Updated: 02/04/21 1822     Glucose 272 mg/dL      Comment: : HILDA JohnsonMetbishop ID: [id number]       POC Glucose Once [[id number]]  (Abnormal) Collected: 02/04/21 1245    Specimen: Blood Updated: 02/04/21 1258     Glucose 143 mg/dL      Comment: : HILDA Mosqueda" CherrynyMeter ID: [id number]       Basic Metabolic Panel [[id number]]  (Abnormal) Collected: 02/04/21 0452    Specimen: Blood Updated: 02/04/21 0631     Glucose 143 mg/dL      BUN 18 mg/dL      Creatinine 0.28 mg/dL      Sodium 139 mmol/L      Potassium 3.8 mmol/L      Chloride 94 mmol/L      CO2 40.0 mmol/L      Calcium 9.1 mg/dL      eGFR  African Amer >150 mL/min/1.73      eGFR Non African Amer >150 mL/min/1.73      BUN/Creatinine Ratio 64.3     Anion Gap 5.0 mmol/L     Narrative:      GFR Normal >60  Chronic Kidney Disease <60  Kidney Failure <15      BNP [[id number]]  (Normal) Collected: 02/04/21 0452    Specimen: Blood Updated: 02/04/21 0631     proBNP 250.8 pg/mL     Narrative:      Among patients with dyspnea, NT-proBNP is highly sensitive for the detection of acute congestive heart failure. In addition NT-proBNP of <300 pg/ml effectively rules out acute congestive heart failure with 99% negative predictive value.    Results may be falsely decreased if patient taking Biotin.      POC Glucose Once [[id number]]  (Abnormal) Collected: 02/04/21 0601    Specimen: Blood Updated: 02/04/21 0612     Glucose 150 mg/dL      Comment: : MBLUE1 Leo NaomiMeter ID: [id number]       CBC & Differential [[id number]]  (Abnormal) Collected: 02/04/21 0452    Specimen: Blood Updated: 02/04/21 0604    Narrative:      The following orders were created for panel order CBC & Differential.  Procedure                               Abnormality         Status                     ---------                               -----------         ------                     CBC Auto Differential[[id number]]        Abnormal            Final result                 Please view results for these tests on the individual orders.    CBC Auto Differential [[id number]]  (Abnormal) Collected: 02/04/21 0452    Specimen: Blood Updated: 02/04/21 0604     WBC 10.45 10*3/mm3      RBC 3.23 10*6/mm3      Hemoglobin 9.4 g/dL      Hematocrit 29.4 %      MCV 91.0 fL       MCH 29.1 pg      MCHC 32.0 g/dL      RDW 18.3 %      RDW-SD 59.5 fl      MPV 10.6 fL      Platelets 261 10*3/mm3      Neutrophil % 76.2 %      Lymphocyte % 7.5 %      Monocyte % 7.4 %      Eosinophil % 6.7 %      Basophil % 0.8 %      Immature Grans % 1.4 %      Neutrophils, Absolute 7.97 10*3/mm3      Lymphocytes, Absolute 0.78 10*3/mm3      Monocytes, Absolute 0.77 10*3/mm3      Eosinophils, Absolute 0.70 10*3/mm3      Basophils, Absolute 0.08 10*3/mm3      Immature Grans, Absolute 0.15 10*3/mm3      nRBC 0.0 /100 WBC     POC Glucose Once [[id number]]  (Normal) Collected: 02/04/21 0036    Specimen: Blood Updated: 02/04/21 0048     Glucose 101 mg/dL      Comment: : SHANIKAKELLY SalgadoMeter ID: [id number]       POC Glucose Once [[id number]]  (Abnormal) Collected: 02/03/21 1842    Specimen: Blood Updated: 02/03/21 1901     Glucose 147 mg/dL      Comment: : [facility] HealthDAY2 Allday Ernie CallejaselleMeter ID: [id number]       POC Glucose Once [[id number]]  (Normal) Collected: 02/03/21 1205    Specimen: Blood Updated: 02/03/21 1220     Glucose 103 mg/dL      Comment: : MALLDAY2 Allday Ernie CallejaselleMeter ID: [id number]       POC Glucose Once [[id number]]  (Normal) Collected: 02/03/21 0514    Specimen: Blood Updated: 02/03/21 0528     Glucose 123 mg/dL      Comment: : CPACINDY britt CarlaMeter ID: [id number]       POC Glucose Once [[id number]]  (Normal) Collected: 02/02/21 2329    Specimen: Blood Updated: 02/02/21 2342     Glucose 124 mg/dL      Comment: : JUANCARLOS AllredaMeter ID: [id number]       POC Glucose Once [[id number]]  (Normal) Collected: 02/02/21 1652    Specimen: Blood Updated: 02/02/21 1706     Glucose 129 mg/dL      Comment: : SURAJ OrdazynMeter ID: [id number]                 CXR: Well-expanded bilateral lungs, continued infiltrates unchanged    Assessment/Plan     Mr. Rowell is a 70-year-old male with COVID-19 pneumonia bilateral pneumothoraxes and critical  illness.  He has had bilateral pneumothorax but chest tubes are in place on both sides.   No air leak now.    Left chest tube removed today, chest x-ray in 3 to 4 hours to ensure no post pull pneumothorax.  We will plan on right chest tube removal next week.    Christiano Martinez M.D.  Cardiothoracic Surgeon

## 2021-02-05 NOTE — PROGRESS NOTES
Jayden Carpenter M.D.  AIYANA Ness        Internal Medicine Progress Note    2/5/2021   10:02 CST    Name:  Duke Rowell  MRN:    [id number]     Acct:     [id number]   Room:  [address] [place] [address] Day: 0     Admit Date: 1/16/2021  1:59 PM  PCP: Provider, No Known    Subjective:     C/C: need for continued vent weaning      Interval History: status: stable. Resting in bed. No family at bedside. Continues with bilateral chest tubes to pleurevacs.  Eyes open spontaneously. Remains off sedation. Tolerating current vent settings (A/C). No progress with vent weaning. Afebrile.     Review of Systems   Unable to perform ROS: dementia         Medications:     Allergies: No Known Allergies    Current Meds:   Current Facility-Administered Medications:   •  acetaminophen (TYLENOL) tablet 650 mg, 650 mg, Per G Tube, Q4H PRN **OR** acetaminophen (TYLENOL) suppository 650 mg, 650 mg, Rectal, Q4H PRN, Maurice Carpenter MD  •  albuterol (PROVENTIL) nebulizer solution 0.083% 2.5 mg/3mL, 2.5 mg, Nebulization, Q2H PRN, Maurice Carpenter MD  •  chlorhexidine (PERIDEX) 0.12 % solution 15 mL, 15 mL, Mouth/Throat, Q12H, Maurice Carpenter MD  •  cholecalciferol (VITAMIN D3) tablet 2,000 Units, 2,000 Units, Per G Tube, Daily, Maurice Carpenter MD  •  citalopram (CeleXA) tablet 20 mg, 20 mg, Per G Tube, Daily, Garrett Gann, AIYANA  •  dextrose (D50W) 25 g/ 50mL Intravenous Solution 25 g, 25 g, Intravenous, Q15 Min PRN, Maurice Carpenter MD  •  dextrose (GLUTOSE) oral gel 15 g, 15 g, Oral, Q15 Min PRN, Maurice Carpenter MD  •  donepezil (ARICEPT) tablet 10 mg, 10 mg, Per G Tube, BID, Maurice Carpenter MD  •  enoxaparin (LOVENOX) syringe 30 mg, 30 mg, Subcutaneous, Q12H, Maurice Carpenter MD  •  famotidine (PEPCID) injection 20 mg, 20 mg, Intravenous, Q12H, Alex Meyers MD  •  furosemide (LASIX) tablet 40 mg, 40 mg, Oral, Daily, Alex Meyers MD  •  gabapentin  (NEURONTIN) 250 MG/5ML solution 100 mg, 100 mg, Per G Tube, Q12H, Maurice Carpenter MD  •  glucagon (human recombinant) (GLUCAGEN DIAGNOSTIC) injection 1 mg, 1 mg, Subcutaneous, Q15 Min PRN, Maurice Carpenter MD  •  guaiFENesin (ROBITUSSIN) 100 MG/5ML oral solution 200 mg, 200 mg, Per G Tube, Q6H, Hailey Mckeon APRN  •  hydrOXYzine (ATARAX) tablet 25 mg, 25 mg, Per G Tube, TID PRN, Maurice Carpenter MD  •  insulin lispro (humaLOG, ADMELOG) injection 0-9 Units, 0-9 Units, Subcutaneous, Q6H, Maurice Carpenter MD  •  ipratropium-albuterol (DUO-NEB) nebulizer solution 3 mL, 3 mL, Nebulization, Q6H - RT, Edy Vail MD  •  lamoTRIgine (LaMICtal) tablet 150 mg, 150 mg, Per G Tube, Q12H, Maurice Carpenter MD  •  medroxyPROGESTERone (PROVERA) tablet 10 mg, 10 mg, Per G Tube, Daily, Maurice Carpenter MD  •  metoclopramide (REGLAN) injection 5 mg, 5 mg, Intravenous, Q8H, Maurice Carpenter MD  •  midodrine (PROAMATINE) tablet 10 mg, 10 mg, Per G Tube, TID AC, Maurice Carpenter MD  •  Morphine sulfate (PF) injection 1 mg, 1 mg, Intravenous, Q2H PRN, Maurice Carpenter MD  •  ondansetron (ZOFRAN) tablet 4 mg, 4 mg, Per G Tube, Q6H PRN **OR** ondansetron (ZOFRAN) injection 4 mg, 4 mg, Intravenous, Q6H PRN, Maurice Carpenter MD  •  potassium & sodium phosphates (PHOS-NAK) oral packet, 1 packet, Per G Tube, BID AC, Maurice Carpenter MD  •  QUEtiapine (SEROquel) tablet 50 mg, 50 mg, Per G Tube, Nightly, Maurice Carpenter MD  •  saccharomyces boulardii (FLORASTOR) capsule 250 mg, 250 mg, Per G Tube, BID, Maurice Carpenter MD  •  sodium chloride 0.9 % flush 10 mL, 10 mL, Intravenous, Q12H, Maurice Carpenter MD  •  sodium chloride 0.9 % flush 10 mL, 10 mL, Intravenous, PRN, Maurice Carpenter MD  •  zinc sulfate (ZINCATE) capsule 220 mg, 220 mg, Per G Tube, BID, Maurice Carpenter MD    Data:     Code Status:    DNR       No family history on  "file.    Social History     Socioeconomic History   • Marital status: Unknown     Spouse name: Not on file   • Number of children: Not on file   • Years of education: Not on file   • Highest education level: Not on file       Vitals:  Ht 167.6 cm (66\")   Wt 66.5 kg (146 lb 11.2 oz)   BMI 23.68 kg/m²     T 98.1 HR 76 RR 24 /54 SPO2  98% (vent)        I/O (24Hr):  No intake or output data in the 24 hours ending 02/05/21 1002    Labs and imaging:      Recent Results (from the past 12 hour(s))   POC Glucose Once    Collection Time: 02/05/21 12:36 AM    Specimen: Blood   Result Value Ref Range    Glucose 98 70 - 130 mg/dL   POC Glucose Once    Collection Time: 02/05/21  5:46 AM    Specimen: Blood   Result Value Ref Range    Glucose 136 (H) 70 - 130 mg/dL           Physical Examination:        Physical Exam  Vitals signs and nursing note reviewed.   Constitutional:       General: He is awake.      Appearance: He is ill-appearing.   HENT:      Head: Normocephalic.      Right Ear: External ear normal.      Left Ear: External ear normal.      Nose: Nose normal.      Mouth/Throat:      Mouth: Mucous membranes are dry.      Pharynx: No oropharyngeal exudate or posterior oropharyngeal erythema.   Eyes:      General: Scleral icterus present.      Pupils: Pupils are equal, round, and reactive to light.   Neck:      Vascular: No carotid bruit.      Comments: Trach to vent  Cardiovascular:      Rate and Rhythm: Normal rate and regular rhythm.      Pulses: Normal pulses.      Heart sounds: Murmur present.   Pulmonary:      Effort: No respiratory distress.      Breath sounds: Decreased breath sounds and rhonchi present.      Comments: Bilateral chest tubes to pleurevac  Abdominal:      General: Abdomen is flat. Bowel sounds are normal. There is no distension.      Palpations: There is no mass.      Tenderness: There is no abdominal tenderness.      Comments: g tube   Musculoskeletal:         General: No swelling.      " Comments: Generalized weakness  Contractures   Lymphadenopathy:      Cervical: No cervical adenopathy.   Skin:     General: Skin is cool and dry.      Capillary Refill: Capillary refill takes 2 to 3 seconds.      Coloration: Skin is pale.   Neurological:      Motor: Weakness present.      Comments: Eyes open spontaneously  Does not follow commands  Nonverbal   Psychiatric:         Behavior: Behavior normal.           Assessment:             Acute tracheostomy management (CMS/HCC)    Ventilator dependence (CMS/HCC)    Acute hypoxemic respiratory failure due to COVID-19 (CMS/HCC)    No past medical history on file.     Plan:        1. Acute hypoxic/hypercapneic respiratory failure  2. S/p COVID pneumonia  3. Bilateral pneumothoraces   4. ARDS  5. Parkinson's Disease  6. Dementia  7. Bipolar disorder   8. Dysphagia  9. DM type 2 with hyperglycemia  10. MRSA pneumonia    Continue current treatment. Monitor counts. Increase activity. Labs Monday. Continue vent weaning under direction of pulmonology. Chest tube maintenance per CT surgery. Maintain patient safety. Watch off antibiotics. Glycemic control.  Guarded prognosis with limited rehab potential. Discharge planning.       Electronically signed by AIYANA Pinto on 2/5/2021 at 10:02 CST   I have discussed the care of Duke Rowell, including pertinent history and exam findings, with the nurse practitioner.    I have seen and examined the patient and the key elements of all parts of the encounter have been performed by me.  I agree with the assessment, plan and orders as documented by AIYANA Ness, after I modified the exam findings and the plan of treatments and the final version is my approved version of the assessment.        Electronically signed by Maurice Carpenter MD on 2/5/2021 at 12:02 CST

## 2021-02-05 NOTE — PROGRESS NOTES
[facility] OTOLARYNGOLOGY, HEAD AND NECK SURGERY PROGRESS NOTE   Referring Provider: Maurice Carpenter MD  Reason for Consultation: Trach management  Location: 586/1  Accompanied by: RT  Chief complaint: Trach  Subjective    Interval History:   Since last examined, Duke Rowell has remained on mechanical ventilation with a trach in place.  He remains on the and later with a tube in place as well.  He does not answer any questions.  He does open his eyes to examination.  RT reports that his trach has been stable on the ventilator but he has not management progress weaning off the ventilator.  Patient seen. Chart reviewed.  Review of Systems:   Review of Systems: No change from prior inquiry      Objective    Vital Signs     EXAMINATION:  CONSTITUTIONAL:    well developed  Asthenic, lying in bed, on ventilator and with trach in place, opens eyes to examination     BODY HABITUS:    Very thin     COMMUNICATION:    Opens eyes to my voice     HEAD:     Temporal wasting     FACE:    structure normal, no tenderness present, no lesions/masses, no evidence of trauma     HEARING:    Not evaluated     EARS:     PINNA:     normal, non-tender, skin intact without lesions      NOSE EXTERNAL:    APPEARANCE: normal, straight, with good projection, no tenderness, no lesions, no tenderness, good nasal support, patent nares     NOSE INTERNAL:      Not examined     ORAL CAVITY:      LIPS:      structure normal, no tenderness on palpation, no lesions, no evidence of trauma, normal mobility and oral competence    TEETH:       missing teeth:  upper and lower      diffusely carious      Repair: Very poor, very few teeth remaining    GUMS:      receding    ORAL MUCOSA:       oral mucosa normal, no mucosal lesions  TONGUE:       Mucosa looks intact, mobility not tested     OROPHARYNX:    Not examined     NECK:    thin  Trach in place     LYMPH NODES :    no adenopathy     CHEST/RESPIRATORY:    Mechanical ventilation,  unlabored     CARDIOVASCULAR:    regular rate and rhythm, no murmurs, gallups, no peripheral edema     NEURO/PSYCHIATRIC :    Will open eyes, but not respond to questions     TRACHEOSTOMY SITE:    Tracheostomy tube type: Shiley #8 cuffed DIC     Stoma: Appropriately healing    Secretions: Mild, clear     Voice: Not examined   Date placed: 1/14/2021  Date last changed: 2/5/2021 2 same as above    Physical examination has been copied from prior note.  This is been carefully reviewed and appropriate changes have been made in the documentation.    Results Review:       I have reviewed the patients old records in the chart.  The following results/records were reviewed:  Progress Notes by Suellen Hartman APRN (02/04/2021 10:35)   Progress Notes by Alex Meyers MD (02/04/2021 07:50)   Progress Notes by Maurice Carpenter MD (02/05/2021 09:30)   XR Chest 1 View (02/05/2021 04:34)     Medications, Allergies and Past History Reviewed        Assessment       Acute tracheostomy management (CMS/[facility])    Ventilator dependence (CMS/[facility])    Acute hypoxemic respiratory failure due to COVID-19 (CMS/[facility])         Plan      Remains on mechanical ventilation via tracheostomy tube.  He has not made much progress on the ventilator.  He has had a trach change today.  Continue trach care.    Following patient as in-patient. Further recommendations will be made based on serial examinations.    Medications/Orders for this encounter: No new medications ordered.  No New orders written.        Tevin Benoit Jr, MD  02/05/21  12:39 CST

## 2021-02-05 NOTE — PROGRESS NOTES
PULMONARY AND CRITICAL CARE PROGRESS NOTE - [facility]  Patient: Duke Rowell    1950   Acct# [id number]  02/05/21   13:28 CST  Referring Provider: Maurice Carpenter MD   Chief Complaint: Respiratory failure, mechanical ventilation   Interval history: The patient is resting in bed with trach to vent, pressure control, 50% FiO2 with a sat of 98%.  Decreased FiO2 to 40%.  Bilateral chest tubes are intact.  Patient nods head to simple questions.  No other aggravating or alleviating factors.     Review of Systems: Review of Systems   Unable to perform ROS: Intubated   Physical Exam:  Vital signs: T: 98.1, blood pressure 109/54, pulse 76, respiratory rate 24, end-tidal 55%, saturation 98%      Vent settings: Mode AC/PC, R 14, Pi 15, Ti 0.8, PEEP 5, FiO2 40%  Physical Exam   Constitutional:       General: He is lethargic.      Appearance: He is ill-appearing. He is not toxic-appearing.      Interventions: He is intubated.   HENT:      Head: Normocephalic.      Nose: Nose normal.  Tracheostomy to vent  Eyes:      General: No scleral icterus.  Cardiovascular:      Rate and Rhythm: Normal rate and regular rhythm.   Pulmonary:      Effort: No respiratory distress. He is intubated.      Breath sounds: Decreased breath sounds and rhonchi present.      Comments:  No dyssynchrony , bilateral chest tubes present, secured, attached to atriums.   Abdominal:      General: There is no distension.  Nutrition to PEG tube.     Palpations: Abdomen is soft.   Musculoskeletal:      Right lower leg: No edema.      Left lower leg: No edema.     Bilateral upper extremities- edematous  Skin:     Findings: No rash.   Neurological:      Mental Status: He nods head yes/no to simple questions.       Motor: No tremor or seizure activity.   Psychiatric:         Speech: He is noncommunicative.         Behavior: Behavior is not agitated.   Results from last 7 days   Lab Units 02/04/21  0452 02/01/21  0350   WBC  10*3/mm3 10.45 7.62   HEMOGLOBIN g/dL 9.4* 9.4*   PLATELETS 10*3/mm3 261 319     Results from last 7 days   Lab Units 02/04/21  0452 02/01/21  0350 01/30/21  0435   SODIUM mmol/L 139 140 138   POTASSIUM mmol/L 3.8 4.0 4.3   BUN mg/dL 18 17 18   CREATININE mg/dL 0.28* 0.23* 0.24*       No results found for: BLOODCX, URINECX, RESPCX  Recent films:  Xr Chest 1 View    Result Date: 2/5/2021  Impression: 1.   No significant interval change since previous exam.   This report was finalized on 02/05/2021 07:38 by Dr. Mary Quevedo MD.    Xr Chest 1 View    Result Date: 2/4/2021  Impression: 1.   No measurable pneumothorax. 2.  Improved aeration of the lungs, better seen in the left hemithorax..   This report was finalized on 02/04/2021 07:35 by Dr. Mary Quevedo MD.    Films reviewed personally by me.  My interpretation: Stable from previous.    Pulmonary Assessment:  1. Acute respiratory failure with hypoxia and hypercapnia- Life threatening, pulmonary system failed  2. Dementia  3. Pulmonary infiltrates related to covid and also superinfection  4. Bilateral pneumothorax compensated with chest tubes  5. Hfpef.  possibly decompensated  6. Bipolar disorder  7. Depression     Recommend/plan:   · Continue Mechanical Vent.  Decrease FiO2 to 40%  · Celexa 20 mg daily via PEG tube.  · CT surgery following for bilateral chest tubes   · Aricept, Lamictal, and Seroquel for underlying dementia and bipolar  · Bronchodilators-DuoNeb  · Mucolytic-Robitussin  · DVT prophylaxis-Lovenox  · Stress ulcer prophylaxis-pepcid   · CXR daily   · ABG prn   · Antibiotic-completed  · Nutrition per TF   · CBC/BMP every 3rd day   · Zinc and vitamin D  · PT/OT/SLP   · Prognosis guarded  · Will likely need long term vent facility placement     Electronically signed by AIYANA Reyes, on 2/5/2021, 13:28 CST     ATTESTATION OF CLINICAL NOTE:  I have reviewed the notes, assessments, and/or procedures performed by AIYANA Reyes, I  concur with her/his documentation of Duke Rowell.    Patient was seen in the LTAC unit for a follow-up visit.    His mental status did not change and he opens eyes but does not follow commands.  He has bilateral chest tubes and remains on full ventilator support with pressure control.  Cardiothoracic surgery is following the chest tube.  Psychiatric medications and has history of underlying dementia.  He remains on ventilator support.  He has his tracheostomy tube changed by  today.  Size #8 cuffed tracheostomy tube was placed. He is getting tube feeding.  He remains afebrile and had no other acute events overnight.  No airleak noted in the chest tubes.  Apparently patient's daughter and Dr. Carpenter talk today and as patient is a g[facility]mez of the [facility] paperwork has been done to initiate the process to make him a more palliative care.  I have signed the paperwork today.        Physical examination patient is an elderly  male lying in the bed on ventilator tracheostomy opening eyes but not respond to verbal commands.  HEENT: Atraumatic normocephalic.  Neck: Supple no mass nuclear venous distention no lymphadenopathy.  Tracheostomy tube in place. Heart: Sounds normal rate and rhythm regular no murmur.  Lungs: Bibasilar crackles and diffuse wheezes.  Lateral chest tubes with pleural VAC noted. Abdomen: Soft nondistended nontender.  Tube noted. Extremities: No edema normal pulses normal color.  Neurologic: Grossly unchanged.  Skin: No breakdown.  Psych: Could not be assessed.  Patient remains encephalopathic.     Continue on full mechanical ventilation and current settings.  Spontaneous breathing trials as tolerated.  FiO2 decreased to 40%..  Routine respiratory care and pulmonary toilet.  To new current ventilator settings and titrate PEEP and FiO2 as tolerated to keep oxygen more than 92%.   Keep oxygen saturation more than 92%.  Continue bronchodilator treatment.  Management of chest tubes per CT  surgery.  He had bilateral pneumothorax and is resolved now..  He is also known to have dementia and depression with bipolar disorder.  Continue Celexa.  Continue Aricept and Lamictal and continue psych medications. Continue nutritional support with tube feeding..  Patient completed antibiotic coverage.  PT/OT/SLP to continue.  Continue DVT and stress ulcer prophylaxis.  If patient continues care he will need long-term ventilator facility.  Overall prognosis guarded due to underlying psychiatric problem and dementia.  Palliative care will be attempted as discussed above.  Repeat labs and imaging studies from time to time.  Overall prognosis guarded.  Pulmonary team will continue following him and make further recommendations.     I have seen and examined patient personally, performing a face-to-face diagnostic evaluation with plan of care reviewed and developed with APRN and nursing staff. I have addended and/or modified the above history of present illness, physical examination, and assessment and plan to reflect my findings and impressions. Essential elements of the care plan were discussed with APRN above.  Agree with findings and assessment/plan as documented above.    Alex Meyers MD  Pulmonologist/Intensivist  2/5/2021 16:33 CST

## 2021-02-05 NOTE — PROGRESS NOTES
[facility] [place] OTOLARYNGOLOGY, HEAD AND NECK SURGERY PROCEDURE NOTE  Anesthesia: none    Indications for Procedure:     Acute tracheostomy management (CMS/[facility])    Ventilator dependence (CMS/[facility])    Acute hypoxemic respiratory failure due to COVID-19 (CMS/[facility])       Endoscopy Type: Flexible Laryngoscopy    Procedure Details:    The patient was placed in an upright position.  The laryngoscope was passed left nasal passge.  The nasal cavities, nasopharynx, oropharynx, hypopharynx, and larynx were all examined.  Vocal cords were examined during respiration and phonation.  The following findings were noted:    Findings:   LARYNGOSCOPY FINDINGS :    NASOPHARYNX:     adenoids  Flat, no lesions, Eustachian tubes normal, without lesions, palate with normal mobility without lesions.  OROPHARYNX:         Redundant          BILATERAL: Mild    Posterior walls:        BILATERAL: Dry, mildly red, thick    BASEOF TONGUE:          normal, no lesions, normal position  Bilateral    LINGUAL TONSILS:          normal, no lesions  Bilateral    VALLECULA:         normal, no lesions, no pooling  Bilateral  EPIGLOTTIS:    normal color, position, without lesions  HYPOPHARYNX:    normal mucosa without lesions  Bilateral  ARYEPIGLOTTIC FOLDS:     Abnormal:        BILATERAL: Red, dry, weak  ARYTENOIDS:     Mobility:       Abnormal:  Bilateral- Very weak     [place] size:         BILATERAL: Mildly enlarged  FALSE CORDS:     mucosa:         BILATERAL: Mildly red    mobility:         BILATERAL: Very weak  TRUE VOCAL FOLDS:      appearance:         BILATERAL: Look intact    mobility        BILATERAL: Very weak    mucosa:        BILATERAL: Some secretions sitting on top of the cord, no mucosal lesions  GLOTTIS:     abnormal: No forced closure  SUBGLOTTIS:     unobstructed, patent, no lesions    Condition:  Stable.  Patient tolerated procedure well.    Complications:  None    Endoscopy Type: Flexible Tracheobronchoscopy    Indications for Procedure:    Tracheostomy evaluation  Trachea evaluation    Procedure Details:    The patient was placed in the sitting position.  The SCOPE TYPE: Flexible laryngoscope was passed both via the tracheostome and the tracheostomy tube .  The trachea from the subglottis to the yamil was examined.  A retrograde examination of the Vocal cords and subglottis was carried out during respiration and phonation.  The following findings were noted:    Findings:   TRACHEO-BRONCHOSCOPY:    Stoma: Healing appropriately   Distal Trachea: Intact, no secretions, yamil sharp, MSB patent   Proximal trachea: Mild secretions present, no scarring   True Vocal cords: No lesions from underneath,   Subglottis: Minimal secretions, no scarring     Condition:  Stable.  Patient tolerated procedure well.    Complications:  None    Procedure:  Tracheostomy tube change    Procedure Details:    The patient's respiratory status was assessed.  The trach tube in position was assessed.  The patient was positioned.  The trach tube was removed without difficulty. The stoma and trachea were inspected.    A new tracheostomy tube was re- inserted. Trach tube position was confirmed with scope. Trach tube type:  Shiley trach tube Size  8 Cuffed    Findings: See above    Condition:  Stable.  Patient tolerated procedure well.    Complications:  None    Post-procedure instructions reviewed with RT     Electronically signed by Tevin Benoit Jr, MD, 02/05/21, 12:57 PM CST.

## 2021-02-06 NOTE — PROGRESS NOTES
PULMONARY AND CRITICAL CARE PROGRESS NOTE - [facility]  Patient: Duke Rowell    1950   Acct# [id number]  02/06/21   11:46 CST  Referring Provider: Maurice Carpenter MD   Chief Complaint: Respiratory failure, mechanical ventilation   Interval history: The patient is resting in bed with trach to mechanical vent.  Right-sided chest tube remains intact.  Left-sided chest tube has been removed.  No other aggravating or alleviating factors.     Review of Systems: Review of Systems   Unable to perform ROS: Intubated   Physical Exam:  Vital signs: T: 97.8, blood pressure 111/54, pulse 85, respiratory rate 21, end-tidal 51%, saturation 92%      Vent settings: Mode AC/PC, R 14, Pi 15, Ti 0.8, PEEP 5, FiO2 40%  Physical Exam   Constitutional:       General: He is lethargic.      Appearance: He is ill-appearing. He is not toxic-appearing.      Interventions: He is intubated.   HENT:      Head: Normocephalic.      Nose: Nose normal.  Tracheostomy to vent  Eyes:      General: No scleral icterus.  Cardiovascular:      Rate and Rhythm: Normal rate and regular rhythm.   Pulmonary:      Effort: No respiratory distress. He is intubated.      Breath sounds: Decreased breath sounds and rhonchi present.      Comments:  No dyssynchrony, right chest tube present, secured, attached to atrium.   Abdominal:      General: There is no distension.  Nutrition to PEG tube.     Palpations: Abdomen is soft.   Musculoskeletal:      Right lower leg: No edema.      Left lower leg: No edema.     Bilateral upper extremities- edematous  Skin:     Findings: No rash.   Neurological:      Mental Status: He nods head yes/no to simple questions.       Motor: No tremor or seizure activity.   Psychiatric:         Speech: He is noncommunicative.         Behavior: Behavior is not agitated.   Results from last 7 days   Lab Units 02/04/21  0452 02/01/21  0350   WBC 10*3/mm3 10.45 7.62   HEMOGLOBIN g/dL 9.4* 9.4*   PLATELETS  10*3/mm3 261 319     Results from last 7 days   Lab Units 02/04/21  0452 02/01/21  0350   SODIUM mmol/L 139 140   POTASSIUM mmol/L 3.8 4.0   BUN mg/dL 18 17   CREATININE mg/dL 0.28* 0.23*       No results found for: BLOODCX, URINECX, RESPCX  Recent films:  EXAMINATION: XR CHEST 1 VW-     2/6/2021 4:10 AM CST     HISTORY: Respiratory failure. Ventilator.     Portable chest x-ray compared with yesterday.     Hypoaeration with patchy infiltrate again seen throughout both lungs.  Left lower lobe air bronchograms again seen.     Unchanged tracheostomy tube, right PICC line, and right chest tube.     No visible pneumothorax.     Summary:  1. Stable line and tubes.  2. Slightly lower lung volumes with patchy infiltrate again seen  throughout both lungs.  3. No improvement.     This report was finalized on 02/06/2021 08:11 by Dr. Darshan Saab MD.    Xr Chest 1 View    Result Date: 2/5/2021  1. Left chest tube removed without measurable pneumothorax.  This report was finalized on 02/05/2021 19:52 by Dr. Jose Monroy MD.    Xr Chest 1 View    Result Date: 2/5/2021  Impression: 1.   No significant interval change since previous exam.   This report was finalized on 02/05/2021 07:38 by Dr. Mary Quevedo MD.    Films reviewed personally by me.  My interpretation: Stable.    Pulmonary Assessment:  1. Acute respiratory failure with hypoxia and hypercapnia- Life threatening, pulmonary system failed  2. Dementia  3. Pulmonary infiltrates related to covid and also superinfection  4. Bilateral pneumothorax compensated with chest tubes (Left chest tube removed 2/5/2021)  5. Hfpef.  possibly decompensated  6. Bipolar disorder  7. Depression     Recommend/plan:   · Continue Mechanical Vent.  Decrease FiO2 to 40%  · Celexa 20 mg daily via PEG tube.  · CT surgery following for chest tube  · Aricept, Lamictal, and Seroquel for underlying dementia and bipolar  · Bronchodilators-DuoNeb  · Mucolytic-Robitussin  · DVT  prophylaxis-Lovenox  · Stress ulcer prophylaxis-pepcid   · CXR daily   · ABG prn   · Antibiotic-completed  · Nutrition per TF   · CBC/BMP every 3rd day   · Zinc and vitamin D  · PT/OT/SLP   · Prognosis guarded  · Will likely need long term vent facility placement     Electronically signed by AIYANA Reyes, on 2/6/2021, 11:46 CST     ATTESTATION OF CLINICAL NOTE:  I have reviewed the notes, assessments, and/or procedures performed by AIYANA Reyes, I concur with her/his documentation of Duke Rowell.    Patient was seen in the follow-up visit in LTAC unit today.  He appears stable and comfortable.  He remains on ventilator support and currently on pressure control ventilation with driving pressure of 15, PEEP of 5, rate of 14 and FiO2 40%.  He had one sided chest tube removed and has right-sided chest tube only.  He is waiting for transition to palliative care and as he is a gómez of [facility] Dr. Carpenter is working with them to make the transition and I also signed the paperwork.  ENT is managing his trach.  He is otherwise stable and did not have any acute events overnight.    Physical examination patient is an elderly  male lying in the bed on ventilator tracheostomy opening eyes but not respond to verbal commands.  HEENT: Atraumatic normocephalic.  Neck: Supple no mass nuclear venous distention no lymphadenopathy.  Tracheostomy tube in place. Heart: Sounds normal rate and rhythm regular no murmur.  Lungs: Bibasilar crackles and diffuse wheezes.  Right-sided chest tube with pleural VAC noted. Abdomen: Soft nondistended nontender.  Tube noted. Extremities: No edema normal pulses normal color.  Neurologic: Grossly unchanged.  Skin: No breakdown.  Psych: Could not be assessed.  Patient remains encephalopathic.     Continue current ventilator support with the pressure control ventilation.  Routine respiratory care and pulmonary toilet.  To new current ventilator settings and titrate PEEP  and FiO2 as tolerated to keep oxygen more than 92%. .  Continue bronchodilator treatment.  Management of chest tubes per CT surgery.  He had bilateral pneumothorax and is resolved now.. Right-sided chest tube in place and left chest tube removed.  He is also known to have dementia and depression with bipolar disorder.  Continue Celexa.  Continue Aricept and Lamictal and continue psych medications. Continue nutritional support with tube feeding..  Patient completed antibiotic coverage.  PT/OT/SLP to continue.  Continue DVT and stress ulcer prophylaxis.  Patient is waiting for transition to palliative care.Repeat labs and imaging studies from time to time.  Overall prognosis guarded.  Pulmonary team will continue following him and make further recommenda    I have seen and examined patient personally, performing a face-to-face diagnostic evaluation with plan of care reviewed and developed with APRN and nursing staff. I have addended and/or modified the above history of present illness, physical examination, and assessment and plan to reflect my findings and impressions. Essential elements of the care plan were discussed with APRN above.  Agree with findings and assessment/plan as documented above.    Alex Meyers MD  Pulmonologist/Intensivist  2/6/2021 14:37 CST

## 2021-02-06 NOTE — PROGRESS NOTES
Jayden Carpenter M.D.  AIYANA Ness        Internal Medicine Progress Note    2/6/2021   16:00 CST    Name:  Duke Rowell  MRN:    [id number]     Acct:     [id number]   Room:  [address] [place] [address] Day: 0     Admit Date: 1/16/2021  1:59 PM  PCP: Provider, No Known    Subjective:     C/C: need for continued vent weaning      Interval History: status: stable.  Resting in bed.  No family at bedside.  Continues with bilateral chest tubes to Pleur-evac's.  Off sedation and opens eyes.  Tolerating current vent settings.  Afebrile.  Glucose ranging from 144-157.  Chest x-ray this morning slightly lower lung volumes with patchy infiltrate throughout both lungs, no improvement.  Discussed plan of care with nurse.        Review of Systems   Unable to perform ROS: dementia         Medications:     Allergies: No Known Allergies    Current Meds:   Current Facility-Administered Medications:   •  acetaminophen (TYLENOL) tablet 650 mg, 650 mg, Per G Tube, Q4H PRN **OR** acetaminophen (TYLENOL) suppository 650 mg, 650 mg, Rectal, Q4H PRN, Maurice Carpenter MD  •  albuterol (PROVENTIL) nebulizer solution 0.083% 2.5 mg/3mL, 2.5 mg, Nebulization, Q2H PRN, Maurice Carpenter MD  •  chlorhexidine (PERIDEX) 0.12 % solution 15 mL, 15 mL, Mouth/Throat, Q12H, Maurice Carpenter MD  •  cholecalciferol (VITAMIN D3) tablet 2,000 Units, 2,000 Units, Per G Tube, Daily, Maurice Carpenter MD  •  citalopram (CeleXA) tablet 20 mg, 20 mg, Per G Tube, Daily, Garrett Gann, AIYANA  •  dextrose (D50W) 25 g/ 50mL Intravenous Solution 25 g, 25 g, Intravenous, Q15 Min PRN, Maurice Carpenter MD  •  dextrose (GLUTOSE) oral gel 15 g, 15 g, Oral, Q15 Min PRN, Maurice Carpenter MD  •  donepezil (ARICEPT) tablet 10 mg, 10 mg, Per G Tube, BID, Maurice Carpenter MD  •  enoxaparin (LOVENOX) syringe 30 mg, 30 mg, Subcutaneous, Q12H, Maurice Carpenter MD  •  famotidine (PEPCID) injection 20 mg, 20 mg,  Intravenous, Q12H, Alex Meyers MD  •  furosemide (LASIX) tablet 40 mg, 40 mg, Oral, Daily, Alex Meyers MD  •  gabapentin (NEURONTIN) 250 MG/5ML solution 100 mg, 100 mg, Per G Tube, Q12H, Maruice Carpenter MD  •  glucagon (human recombinant) (GLUCAGEN DIAGNOSTIC) injection 1 mg, 1 mg, Subcutaneous, Q15 Min PRN, Maurice Carpenter MD  •  guaiFENesin (ROBITUSSIN) 100 MG/5ML oral solution 200 mg, 200 mg, Per G Tube, Q6H, Hailey Mckeon APRN  •  hydrOXYzine (ATARAX) tablet 25 mg, 25 mg, Per G Tube, TID PRN, Maurice Carpenter MD  •  insulin lispro (humaLOG, ADMELOG) injection 0-9 Units, 0-9 Units, Subcutaneous, Q6H, Maurice Carpenter MD  •  ipratropium-albuterol (DUO-NEB) nebulizer solution 3 mL, 3 mL, Nebulization, Q6H - RT, Edy Vail MD  •  lamoTRIgine (LaMICtal) tablet 150 mg, 150 mg, Per G Tube, Q12H, Maurice Carpenter MD  •  medroxyPROGESTERone (PROVERA) tablet 10 mg, 10 mg, Per G Tube, Daily, Maurice Carpenter MD  •  metoclopramide (REGLAN) injection 5 mg, 5 mg, Intravenous, Q8H, Maurice Carpenter MD  •  midodrine (PROAMATINE) tablet 10 mg, 10 mg, Per G Tube, TID AC, Maurice Carpenter MD  •  Morphine sulfate (PF) injection 1 mg, 1 mg, Intravenous, Q2H PRN, Maurice Carpenter MD  •  ondansetron (ZOFRAN) tablet 4 mg, 4 mg, Per G Tube, Q6H PRN **OR** ondansetron (ZOFRAN) injection 4 mg, 4 mg, Intravenous, Q6H PRN, Maurice Carpenter MD  •  potassium & sodium phosphates (PHOS-NAK) oral packet, 1 packet, Per G Tube, BID KARLEY, Maurice Carpenter MD  •  QUEtiapine (SEROquel) tablet 50 mg, 50 mg, Per G Tube, Nightly, Maurice Carpenter MD  •  saccharomyces boulardii (FLORASTOR) capsule 250 mg, 250 mg, Per G Tube, BID, Maurice Carpenter MD  •  sodium chloride 0.9 % flush 10 mL, 10 mL, Intravenous, Q12H, Maurice Carpenter MD  •  sodium chloride 0.9 % flush 10 mL, 10 mL, Intravenous, PRN, Maurice Carpenter MD  •  zinc sulfate  "(ZINCATE) capsule 220 mg, 220 mg, Per G Tube, BID, Maurice Carpenter MD    Data:     Code Status:    DNR       No family history on file.    Social History     Socioeconomic History   • Marital status: Unknown     Spouse name: Not on file   • Number of children: Not on file   • Years of education: Not on file   • Highest education level: Not on file       Vitals:  Ht 167.6 cm (66\")   Wt 66.5 kg (146 lb 11.2 oz)   BMI 23.68 kg/m²     T 97.8 HR 85 RR 21 /54 SPO2  92% (vent)        I/O (24Hr):  No intake or output data in the 24 hours ending 02/06/21 1600    Labs and imaging:      Recent Results (from the past 12 hour(s))   POC Glucose Once    Collection Time: 02/06/21  5:30 AM    Specimen: Blood   Result Value Ref Range    Glucose 146 (H) 70 - 130 mg/dL   POC Glucose Once    Collection Time: 02/06/21 11:44 AM    Specimen: Blood   Result Value Ref Range    Glucose 157 (H) 70 - 130 mg/dL           Physical Examination:        Physical Exam  Vitals signs and nursing note reviewed.   Constitutional:       General: He is awake.      Appearance: He is ill-appearing.   HENT:      Head: Normocephalic.      Right Ear: External ear normal.      Left Ear: External ear normal.      Nose: Nose normal.      Mouth/Throat:      Mouth: Mucous membranes are dry.      Pharynx: No oropharyngeal exudate or posterior oropharyngeal erythema.   Eyes:      General: Scleral icterus present.      Pupils: Pupils are equal, round, and reactive to light.   Neck:      Vascular: No carotid bruit.      Comments: Trach to vent  Cardiovascular:      Rate and Rhythm: Normal rate and regular rhythm.      Pulses: Normal pulses.      Heart sounds: Murmur present.   Pulmonary:      Effort: No respiratory distress.      Breath sounds: Decreased breath sounds and rhonchi present.      Comments: Bilateral chest tubes to pleurevac  Abdominal:      General: Abdomen is flat. Bowel sounds are normal. There is no distension.      Palpations: There is " no mass.      Tenderness: There is no abdominal tenderness.      Comments: g tube   Musculoskeletal:         General: No swelling.      Comments: Generalized weakness  Contractures   Lymphadenopathy:      Cervical: No cervical adenopathy.   Skin:     General: Skin is cool and dry.      Capillary Refill: Capillary refill takes 2 to 3 seconds.      Coloration: Skin is pale.   Neurological:      Motor: Weakness present.      Comments: Eyes open spontaneously  Does not follow commands  Nonverbal   Psychiatric:         Behavior: Behavior normal.           Assessment:             Acute tracheostomy management (CMS/[facility])    Ventilator dependence (CMS/[facility])    Acute hypoxemic respiratory failure due to COVID-19 (CMS/[facility])    No past medical history on file.     Plan:        1. Acute hypoxic/hypercapneic respiratory failure  2. S/p COVID pneumonia  3. Bilateral pneumothoraces   4. ARDS  5. Parkinson's Disease  6. Dementia  7. Bipolar disorder   8. Dysphagia  9. DM type 2 with hyperglycemia  10. MRSA pneumonia    Continue current treatment. Monitor counts. Increase activity. Labs Monday. Continue vent weaning under direction of pulmonology. Chest tube maintenance per CT surgery. Maintain patient safety. Watch off antibiotics. Glycemic control.  Guarded prognosis with limited rehab potential. Discharge planning.       Electronically signed by AIYANA Kee on 2/6/2021 at 16:00 CST

## 2021-02-06 NOTE — PROGRESS NOTES
[facility] OTOLARYNGOLOGY, HEAD AND NECK SURGERY PROGRESS NOTE   Referring Provider: Maurice Carpenter MD  Reason for Consultation: Trach management  Location: 586/1  Accompanied by: No one  Chief complaint: Trach  Subjective    Interval History:   Since last examined, Duke Rowell has remained on mechanical ventilation with trach tube in place.  He did not acknowledge me this morning.  Since last examined, the patient has had removal of one of his chest tubes.  RT reports no issues with the tracheostomy tube.  Patient seen. Chart reviewed.  Review of Systems:   Review of Systems: No change from prior inquiry      Objective    Vital Signs     EXAMINATION:  CONSTITUTIONAL:    well developed  Asthenic, lying in bed, on ventilator and with trach in place, not open eyes to examination     BODY HABITUS:    Very thin     COMMUNICATION:    Opens eyes to my voice     HEAD:     Temporal wasting     FACE:    structure normal, no tenderness present, no lesions/masses, no evidence of trauma     HEARING:    Not evaluated     EARS:     PINNA:     normal, non-tender, skin intact without lesions      NOSE EXTERNAL:    APPEARANCE: normal, straight, with good projection, no tenderness, no lesions, no tenderness, good nasal support, patent nares     NOSE INTERNAL:      Not examined     ORAL CAVITY:      LIPS:      structure normal, no tenderness on palpation, no lesions, no evidence of trauma, normal mobility and oral competence    TEETH:       missing teeth:  upper and lower      diffusely carious      Repair: Very poor, very few teeth remaining    GUMS:      receding    ORAL MUCOSA:       oral mucosa normal, no mucosal lesions  TONGUE:       Mucosa looks intact, mobility not tested     OROPHARYNX:    Not examined     NECK:    thin  Trach in place     LYMPH NODES :    no adenopathy     CHEST/RESPIRATORY:    Mechanical ventilation, unlabored     CARDIOVASCULAR:    regular rate and rhythm, no murmurs, gallups, no peripheral  edema     NEURO/PSYCHIATRIC :    Will open eyes, but not respond to questions     TRACHEOSTOMY SITE:    Tracheostomy tube type: Shiley #8 cuffed DIC     Stoma: Appropriately healing    Secretions: Mild, clear     Voice: Not examined   Date placed: 1/14/2021  Date last changed: 2/5/2021     Physical examination has been copied from prior note.  This is been carefully reviewed and appropriate changes have been made in the documentation.    Results Review:       I have reviewed the patients old records in the chart.  The following results/records were reviewed:  Progress Notes by Christiano Martinez MD (02/05/2021 15:57)   Progress Notes by Alex Meyers MD (02/05/2021 07:50)   XR Chest 1 View (02/05/2021 19:27)     Medications, Allergies and Past History Reviewed        Assessment       Acute tracheostomy management (CMS/[facility])    Ventilator dependence (CMS/[facility])    Acute hypoxemic respiratory failure due to COVID-19 (CMS/[facility])         Plan      Patient remains on mechanical ventilation with a trach in place.  His current trach is stable.  He has had no problems after trach change.  Continue trach care  Discussed plan with RT.    Following patient as in-patient. Further recommendations will be made based on serial examinations.    Medications/Orders for this encounter: No new medications ordered.  No New orders written.        Electronically signed by Tevin Benoit Jr, MD, 02/06/21, 9:40 AM CST.

## 2021-02-07 NOTE — PROGRESS NOTES
PULMONARY AND CRITICAL CARE PROGRESS NOTE - Formerly [facility]  Patient: Duke Rowell    1950   Acct# [id number]  02/07/21   11:32 CST  Referring Provider: Maurice Carpenter MD   Chief Complaint: Respiratory failure, mechanical ventilation   Interval history: The patient is resting in bed with trach to mechanical vent.  Right-sided chest tube remains intact.  No overnight events per RT/RN.  No other aggravating or alleviating factors.     Review of Systems: Review of Systems   Unable to perform ROS: Intubated   Physical Exam:  Vital signs: T: 98.9, blood pressure 119/53, pulse 79, respiratory rate 15, end-tidal 40%, saturation 97%      Vent settings: Mode AC/PC, R 16, Pi 15, Ti 0.8, PEEP 5, FiO2 40%  Physical Exam   Constitutional:       General: He is lethargic.      Appearance: He is ill-appearing. He is not toxic-appearing.      Interventions: He is intubated.   HENT:      Head: Normocephalic.      Nose: Nose normal.  Tracheostomy to vent  Eyes:      General: No scleral icterus.  Cardiovascular:      Rate and Rhythm: Normal rate and regular rhythm.   Pulmonary:      Effort: No respiratory distress. He is intubated.      Breath sounds: Decreased breath sounds and rhonchi present.      Comments:  No dyssynchrony, right chest tube present, secured, attached to atrium.   Abdominal:      General: There is no distension.  Nutrition to PEG tube.     Palpations: Abdomen is soft.   Musculoskeletal:      Right lower leg: No edema.      Left lower leg: No edema.     Bilateral upper extremities- edematous  Skin:     Findings: No rash.   Neurological:      Mental Status: He nods head yes/no to simple questions.       Motor: No tremor or seizure activity.   Psychiatric:         Speech: He is noncommunicative.         Behavior: Behavior is not agitated.   Results from last 7 days   Lab Units 02/07/21  0443 02/04/21  0452 02/01/21  0350   WBC 10*3/mm3 9.13 10.45 7.62   HEMOGLOBIN g/dL 8.3* 9.4* 9.4*    PLATELETS 10*3/mm3 220 261 319     Results from last 7 days   Lab Units 02/07/21  0443 02/04/21  0452 02/01/21  0350   SODIUM mmol/L 139 139 140   POTASSIUM mmol/L 4.1 3.8 4.0   BUN mg/dL 15 18 17   CREATININE mg/dL 0.21* 0.28* 0.23*       No results found for: BLOODCX, URINECX, RESPCX  Recent films:    EXAMINATION: XR CHEST 1 VW-     2/7/2021 3:45 AM CST     HISTORY: Respiratory failure. Ventilator.     One view chest x-ray compared with yesterday.     Tracheostomy tube remains in good position.  Right PICC line is unchanged.     Stable heart size.     Right chest tube remains in place.  No significant pneumothorax.     Patchy infiltrate throughout both lungs is unchanged.     Summary:  1. Infiltrate throughout both lungs.  2. No significant change from yesterday.     This report was finalized on 02/07/2021 08:43 by Dr. Darshan Saab MD.    Xr Chest 1 View    Result Date: 2/5/2021  1. Left chest tube removed without measurable pneumothorax.  This report was finalized on 02/05/2021 19:52 by Dr. Jose Monroy MD.    Films reviewed personally by me.  My interpretation: Right chest tube intact.  Tracheostomy intact.  Bilateral infiltrates.    Pulmonary Assessment:  1. Acute respiratory failure with hypoxia and hypercapnia- Life threatening, pulmonary system failed  2. Dementia  3. Pulmonary infiltrates related to covid and also superinfection  4. Bilateral pneumothorax compensated with chest tubes (Left chest tube removed 2/5/2021)  5. Hfpef.  possibly decompensated  6. Bipolar disorder  7. Depression     Recommend/plan:   · Continue Mechanical Vent  · Celexa 20 mg daily via PEG tube.  · CT surgery following for chest tube  · Aricept, Lamictal, and Seroquel for underlying dementia and bipolar  · Bronchodilators-DuoNeb  · Mucolytic-Robitussin  · DVT prophylaxis-Lovenox  · Stress ulcer prophylaxis-pepcid   · CXR daily   · ABG prn   · Antibiotic-completed  · Nutrition per TF   · CBC/BMP every 3rd day   · Zinc and  vitamin D  · PT/OT/SLP   · Prognosis guarded. Awaiting possible comfort measures.    Electronically signed by AIYANA Reyes, on 2/7/2021, 11:32 CST     ATTESTATION OF CLINICAL NOTE:  I have reviewed the notes, assessments, and/or procedures performed by AIYANA Reyes, I concur with her/his documentation of Duke Rowell.    Patient was seen in the follow-up visit in LTAC unit today.  He appears stable and comfortable.  He remains on ventilator support and currently on pressure control ventilation with driving pressure of 15, PEEP of 5, rate of 16 and FiO2 40%.  He had one sided chest tube removed and has right-sided chest tube only.  He is waiting for transition to palliative care and as he is a gómez of [facility] Dr. Carpenter is working with them to make the transition and I also signed the paperwork.  It remains nonverbal.  ENT is managing his trach.  He is otherwise stable and did not have any acute events overnight.     Physical examination patient is an elderly  male lying in the bed on ventilator tracheostomy opening eyes but not respond to verbal commands.  HEENT: Atraumatic normocephalic.  Neck: Supple no mass nuclear venous distention no lymphadenopathy.  Tracheostomy tube in place. Heart: Sounds normal rate and rhythm regular no murmur.  Lungs: Bibasilar crackles and diffuse wheezes.  Right-sided chest tube with pleural VAC noted. Abdomen: Soft nondistended nontender.  Tube noted. Extremities: No edema normal pulses normal color.  Neurologic: Grossly unchanged.  Skin: No breakdown.  Psych: Could not be assessed.  Patient remains encephalopathic.     Continue current ventilator support with the pressure control ventilation.  Patient not a candidate for weaning at this time.  Routine respiratory care and pulmonary toilet.  To new current ventilator settings and titrate PEEP and FiO2 as tolerated to keep oxygen more than 92%. .  Continue bronchodilator treatment.  He had 2 chest  tubes but left-sided chest tube has been removed and he has only right-sided 1 chest tube.He had bilateral pneumothorax and is resolved now. He is also known to have dementia and depression with bipolar disorder. Continue Celexa.  Continue Aricept and Lamictal and continue psych medications. Continue nutritional support with tube feeding..  Patient completed antibiotic coverage.  PT/OT/SLP to continue.  Continue DVT and stress ulcer prophylaxis.  Patient is waiting for transition to palliative care.Repeat labs and imaging studies from time to time.  Overall prognosis guarded.  Pulmonary team will continue following him and make further recommendations.     I have seen and examined patient personally, performing a face-to-face diagnostic evaluation with plan of care reviewed and developed with APRN and nursing staff. I have addended and/or modified the above history of present illness, physical examination, and assessment and plan to reflect my findings and impressions. Essential elements of the care plan were discussed with APRN above.  Agree with findings and assessment/plan as documented above.    Alex Meyers MD  Pulmonologist/Intensivist  2/7/2021 15:21 CST

## 2021-02-07 NOTE — PROGRESS NOTES
Jayden Carpenter M.D.  AIYANA Ness        Internal Medicine Progress Note    2/7/2021   14:51 CST    Name:  Duke Rowell  MRN:    [id number]     Acct:     [id number]   Room:  [address] [place] [address] Day: 0     Admit Date: 1/16/2021  1:59 PM  PCP: Provider, No Known    Subjective:     C/C: need for continued vent weaning      Interval History: status: stable.  Resting in bed.  No family at bedside.  Continues with bilateral chest tubes to Pleur-evac's.  Off sedation, and eyes open.  None appears to nod appropriately at times.  Tolerating current ventilator settings.  Afebrile.  Glucose ranging from 106-153.  Chest x-ray this morning with no change, infiltrate throughout both lungs.  Family indicated planning to change to comfort care tomorrow.      Review of Systems   Unable to perform ROS: dementia         Medications:     Allergies: No Known Allergies    Current Meds:   Current Facility-Administered Medications:   •  acetaminophen (TYLENOL) tablet 650 mg, 650 mg, Per G Tube, Q4H PRN **OR** acetaminophen (TYLENOL) suppository 650 mg, 650 mg, Rectal, Q4H PRN, Maurice Carpenter MD  •  albuterol (PROVENTIL) nebulizer solution 0.083% 2.5 mg/3mL, 2.5 mg, Nebulization, Q2H PRN, Maurice Carpenter MD  •  chlorhexidine (PERIDEX) 0.12 % solution 15 mL, 15 mL, Mouth/Throat, Q12H, Maurice Carpenter MD  •  cholecalciferol (VITAMIN D3) tablet 2,000 Units, 2,000 Units, Per G Tube, Daily, Maurice Carpenter MD  •  citalopram (CeleXA) tablet 20 mg, 20 mg, Per G Tube, Daily, Garrett Gann, AIYANA  •  dextrose (D50W) 25 g/ 50mL Intravenous Solution 25 g, 25 g, Intravenous, Q15 Min PRN, Maurice Carpenter MD  •  dextrose (GLUTOSE) oral gel 15 g, 15 g, Oral, Q15 Min PRN, Maurice Carpenter MD  •  donepezil (ARICEPT) tablet 10 mg, 10 mg, Per G Tube, BID, Maurice Carpenter MD  •  enoxaparin (LOVENOX) syringe 30 mg, 30 mg, Subcutaneous, Q12H, Maurice Carpenter MD  •  famotidine  (PEPCID) injection 20 mg, 20 mg, Intravenous, Q12H, Alex Meyers MD  •  furosemide (LASIX) tablet 40 mg, 40 mg, Oral, Daily, Alex Meyers MD  •  gabapentin (NEURONTIN) 250 MG/5ML solution 100 mg, 100 mg, Per G Tube, Q12H, Maurice Carpenter MD  •  glucagon (human recombinant) (GLUCAGEN DIAGNOSTIC) injection 1 mg, 1 mg, Subcutaneous, Q15 Min PRN, Maurice Carpenter MD  •  guaiFENesin (ROBITUSSIN) 100 MG/5ML oral solution 200 mg, 200 mg, Per G Tube, Q6H, Hailey Mckeon APRN  •  hydrOXYzine (ATARAX) tablet 25 mg, 25 mg, Per G Tube, TID PRN, Maurice Carpenter MD  •  insulin lispro (humaLOG, ADMELOG) injection 0-9 Units, 0-9 Units, Subcutaneous, Q6H, Maurice Carpenter MD  •  ipratropium-albuterol (DUO-NEB) nebulizer solution 3 mL, 3 mL, Nebulization, Q6H - RT, Edy Vail MD  •  lamoTRIgine (LaMICtal) tablet 150 mg, 150 mg, Per G Tube, Q12H, Maurice Carpenter MD  •  medroxyPROGESTERone (PROVERA) tablet 10 mg, 10 mg, Per G Tube, Daily, Maurice Carpenter MD  •  metoclopramide (REGLAN) injection 5 mg, 5 mg, Intravenous, Q8H, Maurice Carpenter MD  •  midodrine (PROAMATINE) tablet 10 mg, 10 mg, Per G Tube, TID AC, Maurice Carpenter MD  •  Morphine sulfate (PF) injection 1 mg, 1 mg, Intravenous, Q2H PRN, Maurice Carpenter MD  •  ondansetron (ZOFRAN) tablet 4 mg, 4 mg, Per G Tube, Q6H PRN **OR** ondansetron (ZOFRAN) injection 4 mg, 4 mg, Intravenous, Q6H PRN, Maurice Carpenter MD  •  potassium & sodium phosphates (PHOS-NAK) oral packet, 1 packet, Per G Tube, BID KARLEY, Maurice Carpenter MD  •  QUEtiapine (SEROquel) tablet 50 mg, 50 mg, Per G Tube, Nightly, Maurice Carpenter MD  •  saccharomyces boulardii (FLORASTOR) capsule 250 mg, 250 mg, Per G Tube, BID, Maurice Carpenter MD  •  sodium chloride 0.9 % flush 10 mL, 10 mL, Intravenous, Q12H, Maurice Carpenter MD  •  sodium chloride 0.9 % flush 10 mL, 10 mL, Intravenous, PRN, Maurice Carpenter  "MD Daniel  •  zinc sulfate (ZINCATE) capsule 220 mg, 220 mg, Per G Tube, BID, Maurice Carpenter MD    Data:     Code Status:    DNR       No family history on file.    Social History     Socioeconomic History   • Marital status: Unknown     Spouse name: Not on file   • Number of children: Not on file   • Years of education: Not on file   • Highest education level: Not on file       Vitals:  Ht 167.6 cm (66\")   Wt 66.5 kg (146 lb 11.2 oz)   BMI 23.68 kg/m²     T 98.9 HR 79 RR 15 /53 SPO2  97% (vent with FiO2 of 40%)        I/O (24Hr):  No intake or output data in the 24 hours ending 02/07/21 1451    Labs and imaging:      Recent Results (from the past 12 hour(s))   Basic Metabolic Panel    Collection Time: 02/07/21  4:43 AM    Specimen: Blood   Result Value Ref Range    Glucose 106 (H) 65 - 99 mg/dL    BUN 15 8 - 23 mg/dL    Creatinine 0.21 (L) 0.76 - 1.27 mg/dL    Sodium 139 136 - 145 mmol/L    Potassium 4.1 3.5 - 5.2 mmol/L    Chloride 96 (L) 98 - 107 mmol/L    CO2 42.0 (H) 22.0 - 29.0 mmol/L    Calcium 9.2 8.6 - 10.5 mg/dL    eGFR  African Amer >150 >60 mL/min/1.73    eGFR Non African Amer >150 >60 mL/min/1.73    BUN/Creatinine Ratio 71.4 (H) 7.0 - 25.0    Anion Gap 1.0 (L) 5.0 - 15.0 mmol/L   CBC Auto Differential    Collection Time: 02/07/21  4:43 AM    Specimen: Blood   Result Value Ref Range    WBC 9.13 3.40 - 10.80 10*3/mm3    RBC 2.91 (L) 4.14 - 5.80 10*6/mm3    Hemoglobin 8.3 (L) 13.0 - 17.7 g/dL    Hematocrit 26.3 (L) 37.5 - 51.0 %    MCV 90.4 79.0 - 97.0 fL    MCH 28.5 26.6 - 33.0 pg    MCHC 31.6 31.5 - 35.7 g/dL    RDW 17.9 (H) 12.3 - 15.4 %    RDW-SD 58.0 (H) 37.0 - 54.0 fl    MPV 10.7 6.0 - 12.0 fL    Platelets 220 140 - 450 10*3/mm3    Neutrophil % 76.6 (H) 42.7 - 76.0 %    Lymphocyte % 9.6 (L) 19.6 - 45.3 %    Monocyte % 8.4 5.0 - 12.0 %    Eosinophil % 4.1 0.3 - 6.2 %    Basophil % 0.5 0.0 - 1.5 %    Immature Grans % 0.8 (H) 0.0 - 0.5 %    Neutrophils, Absolute 6.99 1.70 - 7.00 10*3/mm3 "    Lymphocytes, Absolute 0.88 0.70 - 3.10 10*3/mm3    Monocytes, Absolute 0.77 0.10 - 0.90 10*3/mm3    Eosinophils, Absolute 0.37 0.00 - 0.40 10*3/mm3    Basophils, Absolute 0.05 0.00 - 0.20 10*3/mm3    Immature Grans, Absolute 0.07 (H) 0.00 - 0.05 10*3/mm3    nRBC 0.0 0.0 - 0.2 /100 WBC   POC Glucose Once    Collection Time: 02/07/21  5:47 AM    Specimen: Blood   Result Value Ref Range    Glucose 133 (H) 70 - 130 mg/dL   POC Glucose Once    Collection Time: 02/07/21 11:56 AM    Specimen: Blood   Result Value Ref Range    Glucose 153 (H) 70 - 130 mg/dL           Physical Examination:        Physical Exam  Vitals signs and nursing note reviewed.   Constitutional:       General: He is awake.      Appearance: He is ill-appearing.   HENT:      Head: Normocephalic.      Right Ear: External ear normal.      Left Ear: External ear normal.      Nose: Nose normal.      Mouth/Throat:      Mouth: Mucous membranes are dry.      Pharynx: No oropharyngeal exudate or posterior oropharyngeal erythema.   Eyes:      General: Scleral icterus present.      Pupils: Pupils are equal, round, and reactive to light.   Neck:      Vascular: No carotid bruit.      Comments: Trach to vent  Cardiovascular:      Rate and Rhythm: Normal rate and regular rhythm.      Pulses: Normal pulses.      Heart sounds: Murmur present.   Pulmonary:      Effort: No respiratory distress.      Breath sounds: Decreased breath sounds and rhonchi present.      Comments: Bilateral chest tubes to pleurevac  Abdominal:      General: Abdomen is flat. Bowel sounds are normal. There is no distension.      Palpations: There is no mass.      Tenderness: There is no abdominal tenderness.      Comments: g tube   Musculoskeletal:         General: No swelling.      Comments: Generalized weakness  Contractures   Lymphadenopathy:      Cervical: No cervical adenopathy.   Skin:     General: Skin is cool and dry.      Capillary Refill: Capillary refill takes 2 to 3 seconds.       Coloration: Skin is pale.   Neurological:      Motor: Weakness present.      Comments: Eyes open spontaneously  Does not follow commands  Nonverbal   Psychiatric:         Behavior: Behavior normal.           Assessment:             Acute tracheostomy management (CMS/[facility])    Ventilator dependence (CMS/[facility])    Acute hypoxemic respiratory failure due to COVID-19 (CMS/HCC)    No past medical history on file.     Plan:        1. Acute hypoxic/hypercapneic respiratory failure  2. S/p COVID pneumonia  3. Bilateral pneumothoraces   4. ARDS  5. Parkinson's Disease  6. Dementia  7. Bipolar disorder   8. Dysphagia  9. DM type 2 with hyperglycemia  10. MRSA pneumonia    Continue current treatment. Monitor counts. Increase activity. Labs Monday. Continue vent weaning under direction of pulmonology. Chest tube maintenance per CT surgery. Maintain patient safety. Watch off antibiotics. Glycemic control.  Guarded prognosis with limited rehab potential.   Family indicated moving forward with comfort care possibly tomorrow.      Electronically signed by AIYANA Kee on 2/7/2021 at 14:51 CST   I have discussed the care of Duke Rowell, including pertinent history and exam findings, with the nurse practitioner.    I have seen and examined the patient and the key elements of all parts of the encounter have been performed by me.  I agree with the assessment, plan and orders as documented by AIYANA Samano, after I modified the exam findings and the plan of treatments and the final version is my approved version of the assessment.        Electronically signed by Maurice Carpenter MD on 2/7/2021 at 20:38 CST

## 2021-02-08 NOTE — PROGRESS NOTES
CC: respiratory failure, pneumothorax    Family is in decision making for possible comfort care.      Vitals and I/o's reviewed    Physical Exam:    General: No acute distress,  Intubated,    Cardiovascular: RRR, no murmur, rubs, or gallops.    Pulmonary: Coarse mech bs.  No wheezing, rubs, or rales.  Abdomen: Soft, non distended, and non tender.  Extremities: Warm, moves all extremities.  Neurologic:  No focal deficits, CN II-XII intact grossly.    CT: serous output.  No air leak    Impression:  Respiratory failure  Pneumothorax resolved    Medical decision-making/recommendations/plan:  Ct to waterseal.    Plan for removal tomorrow post cxr review

## 2021-02-08 NOTE — PROGRESS NOTES
Jayden Carpenter M.D.  AIYANA Ness        Internal Medicine Progress Note    2/8/2021   14:18 CST    Name:  Duke Rowell  MRN:    [id number]     Acct:     [id number]   Room:  [address] [place] [address] Day: 0     Admit Date: 1/16/2021  1:59 PM  PCP: Provider, No Known    Subjective:     C/C: need for continued vent weaning      Interval History: status: stable.  Resting in bed. Family at bedside. Awaiting information from state appointed guardian. Likely to transition to comfort care today. Tolerating current vent settings. Afebrile. Blood sugars stable. Alert - eyes open. Appears in no acute distress.     Review of Systems   Unable to perform ROS: dementia         Medications:     Allergies: No Known Allergies    Current Meds:   Current Facility-Administered Medications:   •  acetaminophen (TYLENOL) tablet 650 mg, 650 mg, Per G Tube, Q4H PRN **OR** acetaminophen (TYLENOL) suppository 650 mg, 650 mg, Rectal, Q4H PRN, Maurice Carpenter MD  •  albuterol (PROVENTIL) nebulizer solution 0.083% 2.5 mg/3mL, 2.5 mg, Nebulization, Q2H PRN, Maurice Carpenter MD  •  chlorhexidine (PERIDEX) 0.12 % solution 15 mL, 15 mL, Mouth/Throat, Q12H, Maurice Carpenter MD  •  cholecalciferol (VITAMIN D3) tablet 2,000 Units, 2,000 Units, Per G Tube, Daily, Maurice Carpenter MD  •  citalopram (CeleXA) tablet 20 mg, 20 mg, Per G Tube, Daily, Garrett Gann, AIYANA  •  dextrose (D50W) 25 g/ 50mL Intravenous Solution 25 g, 25 g, Intravenous, Q15 Min PRN, Maurice Carpenter MD  •  dextrose (GLUTOSE) oral gel 15 g, 15 g, Oral, Q15 Min PRN, Maurice Carpenter MD  •  donepezil (ARICEPT) tablet 10 mg, 10 mg, Per G Tube, BID, Maurice Carpenter MD  •  enoxaparin (LOVENOX) syringe 30 mg, 30 mg, Subcutaneous, Q12H, Maurice Carpenter MD  •  famotidine (PEPCID) injection 20 mg, 20 mg, Intravenous, Q12H, Alex Meyers MD  •  furosemide (LASIX) tablet 40 mg, 40 mg, Oral, Daily, Luigi,  MD Alex  •  gabapentin (NEURONTIN) 250 MG/5ML solution 100 mg, 100 mg, Per G Tube, Q12H, Maurice Carpenter MD  •  glucagon (human recombinant) (GLUCAGEN DIAGNOSTIC) injection 1 mg, 1 mg, Subcutaneous, Q15 Min PRN, Maurice Carpenter MD  •  guaiFENesin (ROBITUSSIN) 100 MG/5ML oral solution 200 mg, 200 mg, Per G Tube, Q6H, Hailey Mckeon APRN  •  hydrOXYzine (ATARAX) tablet 25 mg, 25 mg, Per G Tube, TID PRN, Maurice Carpenter MD  •  insulin lispro (humaLOG, ADMELOG) injection 0-9 Units, 0-9 Units, Subcutaneous, Q6H, Maurice Carpenter MD  •  ipratropium-albuterol (DUO-NEB) nebulizer solution 3 mL, 3 mL, Nebulization, Q6H - RT, Edy Vail MD  •  lamoTRIgine (LaMICtal) tablet 150 mg, 150 mg, Per G Tube, Q12H, Maurice Carpenter MD  •  medroxyPROGESTERone (PROVERA) tablet 10 mg, 10 mg, Per G Tube, Daily, Maurice Carpenter MD  •  metoclopramide (REGLAN) injection 5 mg, 5 mg, Intravenous, Q8H, Maurice Carpenter MD  •  midodrine (PROAMATINE) tablet 10 mg, 10 mg, Per G Tube, TID AC, Maurice Carpenter MD  •  Morphine sulfate (PF) injection 1 mg, 1 mg, Intravenous, Q2H PRN, Maurice Carpenter MD  •  ondansetron (ZOFRAN) tablet 4 mg, 4 mg, Per G Tube, Q6H PRN **OR** ondansetron (ZOFRAN) injection 4 mg, 4 mg, Intravenous, Q6H PRN, Maurice Carpenter MD  •  potassium & sodium phosphates (PHOS-NAK) oral packet, 1 packet, Per G Tube, BID AC, Maurice Carpenter MD  •  QUEtiapine (SEROquel) tablet 50 mg, 50 mg, Per G Tube, Nightly, Maurice Carpenter MD  •  saccharomyces boulardii (FLORASTOR) capsule 250 mg, 250 mg, Per G Tube, BID, Maurcie Carpenter MD  •  sodium chloride 0.9 % flush 10 mL, 10 mL, Intravenous, Q12H, Maurice Carpenter MD  •  sodium chloride 0.9 % flush 10 mL, 10 mL, Intravenous, PRN, Maurice Carpenter MD  •  zinc sulfate (ZINCATE) capsule 220 mg, 220 mg, Per G Tube, BID, Maurice Carpenter MD    Data:     Code Status:    DNR  "      No family history on file.    Social History     Socioeconomic History   • Marital status: Unknown     Spouse name: Not on file   • Number of children: Not on file   • Years of education: Not on file   • Highest education level: Not on file       Vitals:  Ht 167.6 cm (66\")   Wt 63.7 kg (140 lb 6.4 oz)   BMI 22.66 kg/m²     T 98.7 HR 78 RR 24 /56 SPO2  94% (vent)        I/O (24Hr):  No intake or output data in the 24 hours ending 02/08/21 1418    Labs and imaging:      Recent Results (from the past 12 hour(s))   POC Glucose Once    Collection Time: 02/08/21  5:36 AM    Specimen: Blood   Result Value Ref Range    Glucose 158 (H) 70 - 130 mg/dL   POC Glucose Once    Collection Time: 02/08/21 12:22 PM    Specimen: Blood   Result Value Ref Range    Glucose 136 (H) 70 - 130 mg/dL           Physical Examination:        Physical Exam  Vitals signs and nursing note reviewed.   Constitutional:       General: He is awake.      Appearance: He is ill-appearing.   HENT:      Head: Normocephalic.      Right Ear: External ear normal.      Left Ear: External ear normal.      Nose: Nose normal.      Mouth/Throat:      Mouth: Mucous membranes are dry.      Pharynx: No oropharyngeal exudate or posterior oropharyngeal erythema.   Eyes:      General: Scleral icterus present.      Pupils: Pupils are equal, round, and reactive to light.   Neck:      Vascular: No carotid bruit.      Comments: Trach to vent  Cardiovascular:      Rate and Rhythm: Normal rate and regular rhythm.      Pulses: Normal pulses.      Heart sounds: Murmur present.   Pulmonary:      Effort: No respiratory distress.      Breath sounds: Decreased breath sounds and rhonchi present.      Comments: Right chest tube to pleurevac  Abdominal:      General: Abdomen is flat. Bowel sounds are normal. There is no distension.      Palpations: There is no mass.      Tenderness: There is no abdominal tenderness.      Comments: g tube   Musculoskeletal:         General: " No swelling.      Comments: Generalized weakness  Contractures   Lymphadenopathy:      Cervical: No cervical adenopathy.   Skin:     General: Skin is cool and dry.      Capillary Refill: Capillary refill takes 2 to 3 seconds.      Coloration: Skin is pale.      Comments: Dressing c/d/i - left chest wall (old CT site)   Neurological:      Motor: Weakness present.      Comments: Eyes open spontaneously  Does not follow commands  Nonverbal   Psychiatric:         Behavior: Behavior normal.           Assessment:             Acute tracheostomy management (CMS/[facility])    Ventilator dependence (CMS/[facility])    Acute hypoxemic respiratory failure due to COVID-19 (CMS/[facility])    No past medical history on file.     Plan:        1. Acute hypoxic/hypercapneic respiratory failure  2. S/p COVID pneumonia  3. Bilateral pneumothoraces - left pneumothorax has resolved   4. ARDS  5. Parkinson's Disease  6. Dementia  7. Bipolar disorder   8. Dysphagia  9. DM type 2 with hyperglycemia  10. MRSA pneumonia      Family at bedside and is awaiting transition to palliative care. Per discussions with consulting physicians and family, it is felt that due to poor prognosis and quality of life, palliative care/comfort measures is the best course of treatment from this point.       Electronically signed by Maurice Carpenter MD on 2/8/2021 at 14:18 CST

## 2021-02-08 NOTE — PROGRESS NOTES
Jayden Carpenter M.D.  AIYANA Ness        Internal Medicine Progress Note    2/8/2021   10:14 CST    Name:  Duke Rowell  MRN:    [id number]     Acct:     [id number]   Room:  [address] [place] [address] Day: 0     Admit Date: 1/16/2021  1:59 PM  PCP: Provider, No Known    Subjective:     C/C: need for continued vent weaning      Interval History: status: stable.  Resting in bed. Family at bedside. Awaiting information from state appointed guardian. Likely to transition to comfort care today. Tolerating current vent settings. Afebrile. Blood sugars stable. Alert - eyes open. Appears in no acute distress.     Review of Systems   Unable to perform ROS: dementia         Medications:     Allergies: No Known Allergies    Current Meds:   Current Facility-Administered Medications:   •  acetaminophen (TYLENOL) tablet 650 mg, 650 mg, Per G Tube, Q4H PRN **OR** acetaminophen (TYLENOL) suppository 650 mg, 650 mg, Rectal, Q4H PRN, Maurice Carpenter MD  •  albuterol (PROVENTIL) nebulizer solution 0.083% 2.5 mg/3mL, 2.5 mg, Nebulization, Q2H PRN, Maurice Carpenter MD  •  chlorhexidine (PERIDEX) 0.12 % solution 15 mL, 15 mL, Mouth/Throat, Q12H, Maurice Carpenter MD  •  cholecalciferol (VITAMIN D3) tablet 2,000 Units, 2,000 Units, Per G Tube, Daily, Maurice Carpenter MD  •  citalopram (CeleXA) tablet 20 mg, 20 mg, Per G Tube, Daily, Garrett Gann, AIYANA  •  dextrose (D50W) 25 g/ 50mL Intravenous Solution 25 g, 25 g, Intravenous, Q15 Min PRN, Maurice Carpenter MD  •  dextrose (GLUTOSE) oral gel 15 g, 15 g, Oral, Q15 Min PRN, Maurice Carpenter MD  •  donepezil (ARICEPT) tablet 10 mg, 10 mg, Per G Tube, BID, Maurice Carpenter MD  •  enoxaparin (LOVENOX) syringe 30 mg, 30 mg, Subcutaneous, Q12H, Maurice Carpenter MD  •  famotidine (PEPCID) injection 20 mg, 20 mg, Intravenous, Q12H, Alex Meyers MD  •  furosemide (LASIX) tablet 40 mg, 40 mg, Oral, Daily, Luigi,  MD Alex  •  gabapentin (NEURONTIN) 250 MG/5ML solution 100 mg, 100 mg, Per G Tube, Q12H, Maurice Carpenter MD  •  glucagon (human recombinant) (GLUCAGEN DIAGNOSTIC) injection 1 mg, 1 mg, Subcutaneous, Q15 Min PRN, Maurice Carpenter MD  •  guaiFENesin (ROBITUSSIN) 100 MG/5ML oral solution 200 mg, 200 mg, Per G Tube, Q6H, Hailey Mckeon APRN  •  hydrOXYzine (ATARAX) tablet 25 mg, 25 mg, Per G Tube, TID PRN, Maurice Carpenter MD  •  insulin lispro (humaLOG, ADMELOG) injection 0-9 Units, 0-9 Units, Subcutaneous, Q6H, Maurice Carpenter MD  •  ipratropium-albuterol (DUO-NEB) nebulizer solution 3 mL, 3 mL, Nebulization, Q6H - RT, Edy Vail MD  •  lamoTRIgine (LaMICtal) tablet 150 mg, 150 mg, Per G Tube, Q12H, Maurice Carpenter MD  •  medroxyPROGESTERone (PROVERA) tablet 10 mg, 10 mg, Per G Tube, Daily, Maurice Carpenter MD  •  metoclopramide (REGLAN) injection 5 mg, 5 mg, Intravenous, Q8H, Maurice Carpenter MD  •  midodrine (PROAMATINE) tablet 10 mg, 10 mg, Per G Tube, TID AC, Maurice Carpenter MD  •  Morphine sulfate (PF) injection 1 mg, 1 mg, Intravenous, Q2H PRN, Maurice Carpenter MD  •  ondansetron (ZOFRAN) tablet 4 mg, 4 mg, Per G Tube, Q6H PRN **OR** ondansetron (ZOFRAN) injection 4 mg, 4 mg, Intravenous, Q6H PRN, Maurice Carpenter MD  •  potassium & sodium phosphates (PHOS-NAK) oral packet, 1 packet, Per G Tube, BID AC, Maurice Carpenter MD  •  QUEtiapine (SEROquel) tablet 50 mg, 50 mg, Per G Tube, Nightly, Maurice Carpenter MD  •  saccharomyces boulardii (FLORASTOR) capsule 250 mg, 250 mg, Per G Tube, BID, Maurice Carpenter MD  •  sodium chloride 0.9 % flush 10 mL, 10 mL, Intravenous, Q12H, Maurice Carpenter MD  •  sodium chloride 0.9 % flush 10 mL, 10 mL, Intravenous, PRN, Maurice Carpenter MD  •  zinc sulfate (ZINCATE) capsule 220 mg, 220 mg, Per G Tube, BID, Maurice Carpenter MD    Data:     Code Status:    DNR  "      No family history on file.    Social History     Socioeconomic History   • Marital status: Unknown     Spouse name: Not on file   • Number of children: Not on file   • Years of education: Not on file   • Highest education level: Not on file       Vitals:  Ht 167.6 cm (66\")   Wt 63.7 kg (140 lb 6.4 oz)   BMI 22.66 kg/m²     T 98.7 HR 78 RR 24 /56 SPO2  94% (vent)        I/O (24Hr):  No intake or output data in the 24 hours ending 02/08/21 1014    Labs and imaging:      Recent Results (from the past 12 hour(s))   POC Glucose Once    Collection Time: 02/08/21 12:30 AM    Specimen: Blood   Result Value Ref Range    Glucose 125 70 - 130 mg/dL   POC Glucose Once    Collection Time: 02/08/21  5:36 AM    Specimen: Blood   Result Value Ref Range    Glucose 158 (H) 70 - 130 mg/dL           Physical Examination:        Physical Exam  Vitals signs and nursing note reviewed.   Constitutional:       General: He is awake.      Appearance: He is ill-appearing.   HENT:      Head: Normocephalic.      Right Ear: External ear normal.      Left Ear: External ear normal.      Nose: Nose normal.      Mouth/Throat:      Mouth: Mucous membranes are dry.      Pharynx: No oropharyngeal exudate or posterior oropharyngeal erythema.   Eyes:      General: Scleral icterus present.      Pupils: Pupils are equal, round, and reactive to light.   Neck:      Vascular: No carotid bruit.      Comments: Trach to vent  Cardiovascular:      Rate and Rhythm: Normal rate and regular rhythm.      Pulses: Normal pulses.      Heart sounds: Murmur present.   Pulmonary:      Effort: No respiratory distress.      Breath sounds: Decreased breath sounds and rhonchi present.      Comments: Right chest tube to pleurevac  Abdominal:      General: Abdomen is flat. Bowel sounds are normal. There is no distension.      Palpations: There is no mass.      Tenderness: There is no abdominal tenderness.      Comments: g tube   Musculoskeletal:         General: No " swelling.      Comments: Generalized weakness  Contractures   Lymphadenopathy:      Cervical: No cervical adenopathy.   Skin:     General: Skin is cool and dry.      Capillary Refill: Capillary refill takes 2 to 3 seconds.      Coloration: Skin is pale.      Comments: Dressing c/d/i - left chest wall (old CT site)   Neurological:      Motor: Weakness present.      Comments: Eyes open spontaneously  Does not follow commands  Nonverbal   Psychiatric:         Behavior: Behavior normal.           Assessment:             Acute tracheostomy management (CMS/[facility])    Ventilator dependence (CMS/[facility])    Acute hypoxemic respiratory failure due to COVID-19 (CMS/[facility])    No past medical history on file.     Plan:        1. Acute hypoxic/hypercapneic respiratory failure  2. S/p COVID pneumonia  3. Bilateral pneumothoraces - left pneumothorax has resolved   4. ARDS  5. Parkinson's Disease  6. Dementia  7. Bipolar disorder   8. Dysphagia  9. DM type 2 with hyperglycemia  10. MRSA pneumonia    Continue current treatment. Monitor counts. Increase activity. Continue vent weaning under direction of pulmonology. Chest tube maintenance per CT surgery. Maintain patient safety. Watch off antibiotics. Glycemic control.  Guarded prognosis with limited rehab potential. Await information from guardian and will proceed with care as directed.         Electronically signed by AIYANA Pinto on 2/8/2021 at 10:14 CST

## 2021-02-08 NOTE — PROGRESS NOTES
Adult Nutrition  Assessment/PES    Patient Name:  Duke Rowell  YOB: 1950  MRN: [id number]  Admit Date:  1/16/2021    Assessment Date:  2/8/2021    Comments:  Pt tolerating enteral ntn at this time. Peptamen 1.5 at goal rate of 55 ml/hr, auto flush of 33 ml/hr per pump. Minimal residuals noted. Possible comfort today per staff. Cont to follow.    Reason for Assessment     Row Name 02/08/21 1215          Reason for Assessment    Reason For Assessment  follow-up protocol;TF/PN         Nutrition/Diet History     Row Name 02/08/21 1216          Nutrition/Diet History    Typical Food/Fluid Intake  Per chart reveiw pt to go possible comfort today. Family at bedside. TF cont to run at goal rate at this time.     Factors Affecting Nutritional Intake  compromised airway         Anthropometrics     Row Name 02/08/21 1220          Usual Body Weight (UBW)    Weight Loss  unintentional     Weight Loss Time Frame  6lbs over the past week         Labs/Tests/Procedures/Meds     Row Name 02/08/21 1221          Labs/Procedures/Meds    Lab Results Reviewed  reviewed        Medications    Pertinent Medications Reviewed  reviewed         Physical Findings     Row Name 02/08/21 1221          Physical Findings    Overall Physical Appearance  on ventilator support     Gastrointestinal  feeding tube;bowel management system lastBM 2/7     Tubes  PEG     Skin  other (see comments) Leighton score 10           Nutrition Prescription Ordered     Row Name 02/08/21 1222          Nutrition Prescription PO    Current PO Diet  NPO        Nutrition Prescription EN    Enteral Route  PEG     Product  Peptamen 1.5 (Vital 1.5)     TF Delivery Method  Continuous     Continuous TF Goal Rate (mL/hr)  55 mL/hr     Continuous TF Current Rate (mL/hr)  55 mL/hr     Continuous TF Goal Volume (mL)  1210 mL     Continuous TF Current Volume (mL)  1210 mL     Water flush (mL)   33 mL     Water Flush Frequency  Per hour         Evaluation of Received  Nutrient/Fluid Intake     Row Name 02/08/21 1223          Nutrient/Fluid Evaluation    Number of Days Evaluated  Other (comment) 4 days        Calories Evaluation    Enteral Calories (kcal)  1715     Total Calories (kcal)  1715     Total Calories (kcal/kg)  26.92     % of Kcal Needs  92        Protein Evaluation    Enteral Protein (gm)  77.5     Total Protein (gm)  77.5     Total Protein (gm/kg)  1.22     % of Protein Needs  95        Intake Assessment    Energy/Calorie Requirement Assessment  meeting needs     Protein Requirement Assessment  meeting needs     Fluid Requirement Assessment  meeting needs     Average Calorie Intake (days)  4     Average Protein Intake (days)  4     Tolerance  tolerating        Fluid Intake Evaluation    Enteral (Free Water) Fluid (mL)  878     Free Water Flush Fluid (mL)  939     Total Free Water Intake (mL)  1817 mL     IV Fluid (mL)  25 2 days        Recommended Daily Intake Evaluation    RDI  Met        PO Evaluation    % PO Intake  npo        EN Evaluation    Number of Days EN Intake Evaluated  Other (comment) 4 days     TF Residual  no residuals noted at this time.     HOB  Greater than or equal to 30 degress         Problem/Interventions:  Problem 1     Row Name 02/08/21 1228          Nutrition Diagnoses Problem 1    Problem 1  Needs Alternate Route     Etiology (related to)  Medical Diagnosis     Infectious Disease  Other (comment) s/p COVID-19     Neurological  Parkinson's;Dementia;Metabolic encephalopathy;Dysphagia     Pulmonary/Critical Care  Acute respiratory failure;Pneumonia;Ventilator     Skin  Pressure injury     Signs/Symptoms (evidenced by)  NPO;EN Intake Delivery     Percent (%) of EN goal  94 %         Intervention Goal     Row Name 02/08/21 1229          Intervention Goal    General  Nutrition support treatment;Reduce/improve symptoms;Meet nutritional needs for age/condition;Disease management/therapy     TF/PN  Maintain TF/PN     Deliver % of Goal  100 %      Weight  Maintain weight         Nutrition Intervention     Row Name 02/08/21 1230          Nutrition Intervention    RD/Tech Action  Follow Tx progress;Care plan reviewd         Nutrition Prescription     Row Name 02/08/21 1230          Nutrition Prescription EN    Enteral Prescription  Continue same protocol         Education/Evaluation     Row Name 02/08/21 1230          Education    Education  No discharge needs identified at this time        Monitor/Evaluation    Monitor  Per protocol           Electronically signed by:  Angela Robles MS,BECKIE,LD  02/08/21 12:32 CST

## 2021-02-08 NOTE — PROGRESS NOTES
PULMONARY AND CRITICAL CARE PROGRESS NOTE - [facility]  Patient: Duke Rowell    1950   Acct# [id number]  02/08/21   09:26 CST  Referring Provider: Maurice Carpenter MD   Chief Complaint: Respiratory failure, mechanical ventilation   Interval history: The patient is resting in bed with trach to mechanical vent.  Right-sided chest tube remains intact.  He is afebrile.  No reported events overnight.  Saturation 92 on PEEP of 5 and FiO2 0.4.  He is receiving nutrition via tube feeds.  No family present.   Review of Systems: Review of Systems   Unable to perform ROS: Intubated   Physical Exam:  Vital signs: T: 98.7, blood pressure 125/52, pulse 78, respiratory rate 22, end-tidal 54, saturation 92       Vent settings: Mode AC/PC, R 16, Pi 15, Ti 0.8, PEEP 5, FiO2 40  Physical Exam   Constitutional:       General: He is lethargic.      Appearance: He is ill-appearing. He is not toxic-appearing.      Interventions: He is intubated.   HENT:      Head: Normocephalic.      Nose: Nose normal.  Tracheostomy to vent  Eyes:      General: No scleral icterus.  Cardiovascular:      Rate and Rhythm: Normal rate and regular rhythm.   Pulmonary:      Effort: No respiratory distress. He is intubated.      Breath sounds: Decreased breath sounds       Comments:  No dyssynchrony, right chest tube present, secured, attached to atrium.   Abdominal:      General: There is no distension.  Nutrition to PEG tube.     Palpations: Abdomen is soft.   Musculoskeletal:      Right lower leg: No edema.      Left lower leg: No edema.     Bilateral upper extremities- edematous  Skin:     Findings: No rash.   Neurological:      Mental Status: Sleeping, on vent     Motor: No tremor or seizure activity.   Psychiatric:         Speech: He is noncommunicative.         Behavior: Behavior is not agitated.   Results from last 7 days   Lab Units 02/07/21  0443 02/04/21  0452   WBC 10*3/mm3 9.13 10.45   HEMOGLOBIN g/dL 8.3* 9.4*    PLATELETS 10*3/mm3 220 261     Results from last 7 days   Lab Units 02/07/21  0443 02/04/21  0452   SODIUM mmol/L 139 139   POTASSIUM mmol/L 4.1 3.8   BUN mg/dL 15 18   CREATININE mg/dL 0.21* 0.28*       No results found for: BLOODCX, URINECX, RESPCX  Recent films:  Xr Chest 1 View    Result Date: 2/8/2021  1. Bilateral airspace filling infiltrates unchanged from February 7.   2. Right chest tube is present in the right lung is expanded to the chest wall..   This report was finalized on 02/08/2021 07:09 by Dr. George Negron MD.  Films reviewed personally by me.  My interpretation: Tracheostomy in good position, bilateral interstitial lung infiltrates, unchanged, chest tube on the right, no obvious pneumothorax, 2-8-21   Pulmonary Assessment:  1. Acute respiratory failure with hypoxia and hypercapnia- Life threatening, pulmonary system failed  2. Dementia  3. Pulmonary infiltrates related to covid and also superinfection  4. Bilateral pneumothorax compensated with chest tubes (Left chest tube removed 2/5/2021)  5. Hfpef.  possibly decompensated  6. Bipolar disorder  7. Depression   Recommend/plan:   · Continue Mechanical Vent  · Ongoing discussion with family and court system for possible pursuance of comfort measures  · CT surgery following for chest tube  · Aricept, Lamictal, and Seroquel for underlying dementia and bipolar  · Bronchodilators-DuoNeb  · Mucolytic-Robitussin  · DVT prophylaxis-Lovenox 30 mg twice daily  · Stress ulcer prophylaxis-pepcid twice daily  · CXR daily   · ABG prn   · Antibiotic-completed  · Nutrition per TF   · CBC/BMP every 3rd day   · Zinc and vitamin D  · PT/OT/SLP   · Prognosis poor  Electronically signed by AIYANA Robertson, on 2/8/2021, 09:26 CST     Physician substantive portion:  Patient does not have any significant responsiveness.  He appears to be awake.  He is not agitated.  Remains on controlled ventilation.  He does not respond to voice.  Chest tubes in place on the  right side.  Chest x-ray shows expansion of the lung and chronic changes.  He has some crackles and rhonchi.  Trach site is in place and intact.  Appears at a plateau.  Appears to have no real meaningful recovery to be expected.  State working on comfort care measures.  Continue other interventions in the meantime.    I have seen and examined patient personally, performing a face-to-face diagnostic evaluation with plan of care reviewed and developed with APRN and nursing staff. I have addended and/or modified the above history of present illness, physical examination, and assessment and plan to reflect my findings and impressions. Essential elements of the care plan were discussed with APRN above.  Agree with findings and assessment/plan as documented above.    Electronically signed by Edy Vail MD, on 2/8/2021, 12:41 CST

## 2021-02-09 NOTE — PROGRESS NOTES
"[facility]  VIRAJ Carpenter M.D.  AIYANA Ness        Internal Medicine Progress Note    2/9/2021   13:54 CST    Name:  Duke Rowell  MRN:    [id number]     Acct:     [id number]   Room:  [address] [place] [address] Day: 0     Admit Date: 1/16/2021  1:59 PM  PCP: Provider, No Known    Subjective:     C/C: need for continued vent weaning      Interval History: status: stable.  Resting in bed. Family at bedside. Planning terminal extubation and comfort measures later today. Currently, appears in no acute distress.     Review of Systems   Unable to perform ROS: dementia         Medications:     Allergies: No Known Allergies    Current Meds:   Current Facility-Administered Medications:   •  acetaminophen (TYLENOL) tablet 650 mg, 650 mg, Oral, Q4H PRN **OR** acetaminophen (TYLENOL) suppository 650 mg, 650 mg, Rectal, Q4H PRN, Maurice Carpenter MD  •  atropine 1 % ophthalmic solution 2 drop, 2 drop, Sublingual, Q1H PRN, Maurice Carpenter MD  •  LORazepam (ATIVAN) 2 MG/ML concentrated solution 2 mg, 2 mg, Sublingual, Q2H PRN, Maurice Carpenter MD  •  Morphine 1mg/ml infusion 30ml, 1 mg/hr, Intravenous, Continuous, Maurice Carpenter MD  •  Morphine sulfate (PF) injection 0.5 mg, 0.5 mg, Intravenous, Q1H PRN, Maurice Carpenter MD  •  Morphine sulfate (PF) injection 2 mg, 2 mg, Intravenous, Once, Maurice Carpenter MD  •  ondansetron (ZOFRAN) injection 4 mg, 4 mg, Intravenous, Q6H PRN, Maurice Carpenter MD    Data:     Code Status:    DNR       No family history on file.    Social History     Socioeconomic History   • Marital status: Unknown     Spouse name: Not on file   • Number of children: Not on file   • Years of education: Not on file   • Highest education level: Not on file       Vitals:  Ht 167.6 cm (66\")   Wt 63.7 kg (140 lb 6.4 oz)   BMI 22.66 kg/m²     T 97.9 HR 69 RR 20 /66 SPO2  96% (vent)        I/O (24Hr):  No intake or output data in the 24 hours ending 02/09/21 " 1354    Labs and imaging:      Recent Results (from the past 12 hour(s))   POC Glucose Once    Collection Time: 02/09/21  6:15 AM    Specimen: Blood   Result Value Ref Range    Glucose 116 70 - 130 mg/dL           Physical Examination:        Physical Exam  Vitals signs and nursing note reviewed.   Constitutional:       General: He is awake.      Appearance: He is ill-appearing.   HENT:      Head: Normocephalic.      Right Ear: External ear normal.      Left Ear: External ear normal.      Nose: Nose normal.      Mouth/Throat:      Mouth: Mucous membranes are dry.      Pharynx: No oropharyngeal exudate or posterior oropharyngeal erythema.   Eyes:      General: Scleral icterus present.      Pupils: Pupils are equal, round, and reactive to light.   Neck:      Vascular: No carotid bruit.      Comments: Trach to vent  Cardiovascular:      Rate and Rhythm: Normal rate and regular rhythm.      Pulses: Normal pulses.      Heart sounds: Murmur present.   Pulmonary:      Effort: No respiratory distress.      Breath sounds: Decreased breath sounds and rhonchi present.      Comments: Right chest tube to pleurevac  Abdominal:      General: Abdomen is flat. Bowel sounds are normal. There is no distension.      Palpations: There is no mass.      Tenderness: There is no abdominal tenderness.      Comments: g tube   Musculoskeletal:         General: No swelling.      Comments: Generalized weakness  Contractures   Lymphadenopathy:      Cervical: No cervical adenopathy.   Skin:     General: Skin is cool and dry.      Capillary Refill: Capillary refill takes 2 to 3 seconds.      Coloration: Skin is pale.      Comments: Dressing c/d/i - left chest wall (old CT site)   Neurological:      Motor: Weakness present.      Comments: Eyes open spontaneously  Does not follow commands  Nonverbal   Psychiatric:         Behavior: Behavior normal.           Assessment:             Acute tracheostomy management (CMS/[facility])    Ventilator dependence  (CMS/[facility])    Acute hypoxemic respiratory failure due to COVID-19 (CMS/[facility])    No past medical history on file.     Plan:        1. Acute hypoxic/hypercapneic respiratory failure  2. S/p COVID pneumonia  3. Bilateral pneumothoraces - left pneumothorax has resolved   4. ARDS  5. Parkinson's Disease  6. Dementia  7. Bipolar disorder   8. Dysphagia  9. DM type 2 with hyperglycemia  10. MRSA pneumonia      Plans for terminal extubation and institution of comfort care/end of life treatment. Support extended to family at bedside.       Electronically signed by AIYANA Pinto on 2/9/2021 at 13:54 CST

## 2021-02-09 NOTE — PROGRESS NOTES
PULMONARY AND CRITICAL CARE PROGRESS NOTE - [facility]  Patient: Duke Rowell    1950   Acct# [id number]  02/09/21   08:51 CST  Referring Provider: Maurice Carpenter MD   Chief Complaint: Respiratory failure, mechanical ventilation   Interval history: The patient is resting in bed with trach to mechanical vent.  His eyes are open however he does not track with his eyes or follow commands.  Awaiting paperwork from the state to institute comfort measures.  Right-sided chest tube remains intact.  He is afebrile.  No reported events overnight.  Saturation 100% on PEEP of 5 and FiO2 0.4.     Review of Systems: Review of Systems   Unable to perform ROS: Intubated   Physical Exam:  Vital signs: T: 97.9 blood pressure 119/66, pulse 73, respiratory rate 20, end-tidal 45, saturation 100        Vent settings: Mode AC/PC, R 20, Pi 15, Ti 0.8, PEEP 5, FiO2 40  Physical Exam   Constitutional:       General: He is lethargic.      Appearance: He is ill-appearing. He is not toxic-appearing.      Interventions: He is intubated.   HENT:      Head: Normocephalic.      Nose: Nose normal.  Tracheostomy to vent  Eyes:      General: No scleral icterus.  Cardiovascular:      Rate and Rhythm: Normal rate and regular rhythm.   Pulmonary:      Effort: No respiratory distress. He is intubated.      Breath sounds: Decreased breath sounds       Comments:  No dyssynchrony, right chest tube present, secured, attached to atrium.   Abdominal:      General: There is no distension.  Nutrition to PEG tube.     Palpations: Abdomen is soft.   Musculoskeletal:      Right lower leg: No edema.      Left lower leg: No edema.     Bilateral upper extremities- edematous  Skin:     Findings: No rash.   Neurological:      Mental Status: Sleeping, on vent     Motor: No tremor or seizure activity.   Psychiatric:         Speech: He is noncommunicative.         Behavior: Behavior is not agitated.   Results from last 7 days   Lab Units  02/07/21 0443 02/04/21  0452   WBC 10*3/mm3 9.13 10.45   HEMOGLOBIN g/dL 8.3* 9.4*   PLATELETS 10*3/mm3 220 261     Results from last 7 days   Lab Units 02/07/21 0443 02/04/21  0452   SODIUM mmol/L 139 139   POTASSIUM mmol/L 4.1 3.8   BUN mg/dL 15 18   CREATININE mg/dL 0.21* 0.28*       No results found for: BLOODCX, URINECX, RESPCX  Recent films:  Xr Chest 1 View    Result Date: 2/8/2021  1. Bilateral airspace filling infiltrates unchanged from February 7.   2. Right chest tube is present in the right lung is expanded to the chest wall..   This report was finalized on 02/08/2021 07:09 by Dr. George Negron MD.  Films reviewed personally by me.  My interpretation: Tracheostomy in good position, bilateral interstitial lung infiltrates, unchanged, chest tube on the right, no obvious pneumothorax, 2-9-21   Pulmonary Assessment:  1. Acute respiratory failure with hypoxia and hypercapnia- Life threatening, pulmonary system failed  2. Dementia  3. Pulmonary infiltrates related to covid and also superinfection  4. Bilateral pneumothorax compensated with chest tubes (Left chest tube removed 2/5/2021)  5. Hfpef.  possibly decompensated  6. Bipolar disorder  7. Depression   Recommend/plan:   · Continue Mechanical Vent  · Awaiting court paperwork to institute comfort measures, hopeful today   · CT surgery following for chest tube  · Aricept, Lamictal, and Seroquel for underlying dementia and bipolar  · Bronchodilators-DuoNeb  · Mucolytic-Robitussin  · DVT prophylaxis-Lovenox 30 mg twice daily  · Stress ulcer prophylaxis-pepcid twice daily  · CXR daily   · ABG prn   · Antibiotic-completed  · Nutrition per TF   · CBC/BMP every 3rd day   · Zinc and vitamin D  · PT/OT/SLP   · Prognosis poor  Electronically signed by AIYANA Robertson, on 2/9/2021, 08:51 CST     Physician substantive portion:  Patient seems to be a little more attentive today but not interactive.  He is in no distress remains in pressure control ventilation.   He does have a couple of double triggered breaths every now and then soft increased his inspiratory time slightly to help with his synchrony.  Exam shows no distress.  Right-sided chest tube is in place with no pneumothorax and chest tube is to waterseal.  Breath sounds are diminished bilaterally.  They are equal.  Abdomen is scaphoid.  Plan minor vent changes as above.  Otherwise continue current level of support.  Prognosis is poor given multitude of problems and poor functional status despite all that has been done for him over the past several weeks.    I have seen and examined patient personally, performing a face-to-face diagnostic evaluation with plan of care reviewed and developed with APRN and nursing staff. I have addended and/or modified the above history of present illness, physical examination, and assessment and plan to reflect my findings and impressions. Essential elements of the care plan were discussed with APRN above.  Agree with findings and assessment/plan as documented above.    Electronically signed by Edy Vail MD, on 2/9/2021, 11:05 CST

## 2021-02-09 NOTE — PROGRESS NOTES
[facility] [place]ESS NOTE   Referring Provider: Maurice Carpenter MD  Reason for Consultation: Trach management  Location: 586/1  Accompanied by: RT  Chief complaint: Trach  Subjective    Interval History:   Since last examined, Duke Rowell has remained on mechanical ventilation with a trach in place.  He only opens his eyes in response to my examination.  RT reports patient has had no trach issues.  He has made no progress weaning from the ventilator.  Patient seen. Chart reviewed.  Review of Systems:   Review of Systems: No change from prior inquiry      Objective    Vital Signs     EXAMINATION:  CONSTITUTIONAL:    well developed  Asthenic, lying in bed, on ventilator and with trach in place, opens eyes to examination     BODY HABITUS:    Very thin     COMMUNICATION:    Opens eyes to my voice     HEAD:     Temporal wasting     FACE:    structure normal, no tenderness present, no lesions/masses, no evidence of trauma     HEARING:    Not evaluated     EARS:     PINNA:     normal, non-tender, skin intact without lesions      NOSE EXTERNAL:    APPEARANCE: normal, straight, with good projection, no tenderness, no lesions, no tenderness, good nasal support, patent nares     NOSE INTERNAL:      Not examined     ORAL CAVITY:      LIPS:      structure normal, no tenderness on palpation, no lesions, no evidence of trauma, normal mobility and oral competence    TEETH:       missing teeth:  upper and lower      diffusely carious      Repair: Very poor, very few teeth remaining    GUMS:      receding    ORAL MUCOSA:       oral mucosa normal, no mucosal lesions  TONGUE:       Mucosa looks intact, mobility not tested     OROPHARYNX:    Not examined     NECK:    thin  Trach in place     CHEST/RESPIRATORY:    Mechanical ventilation, unlabored     CARDIOVASCULAR:    regular rate and rhythm, no murmurs, gallups, no peripheral edema     NEURO/PSYCHIATRIC :    Will open eyes, but not respond to  questions     TRACHEOSTOMY SITE:    Tracheostomy tube type: Shiley #8 cuffed DIC     Stoma: Appropriately healing    Secretions: Mild, clear     Voice: Not examined   Date placed: 1/14/2021  Date last changed: 2/5/2021     Physical examination has been copied from prior note.  This is been carefully reviewed and appropriate changes have been made in the documentation.    Results Review:       I have reviewed the patients old records in the chart.  The following results/records were reviewed:  Progress Notes by Edy Vail MD (02/08/2021 09:26)   Progress Notes by Maurice Carpenter MD (02/08/2021 14:18)   Progress Notes by Hailey Mckeon,         Tevin Benoit Jr, MD  02/09/21  09:40 CSTAPRN (02/09/2021 08:50)   XR Chest 1 View (02/05/2021 19:27)     Medications, Allergies and Past History Reviewed        Assessment       Acute tracheostomy management (CMS/[facility])    Ventilator dependence (CMS/[facility])    Acute hypoxemic respiratory failure due to COVID-19 (CMS/[facility])         Plan      Patient eddie on mechanical ventilation with trach in position.  He has made no real progress in weaning from the ventilator.  RT reports no issues with the tracheostomy tube.  Continue trach care.  I have no plans to wean the patient's trach at this point in time.  He has a clean trach in place right now.  Plans to discuss comfort measures noted.    Following patient as in-patient. Further recommendations will be made based on serial examinations.    Medications/Orders for this encounter: No new medications ordered.  No New orders written.

## 2021-02-10 NOTE — DISCHARGE SUMMARY
Jayden Carpenter M.D.  AIYANA Ness      Internal Medicine Death Summary    Patient ID: Duke Rowell  MRN: [id number]     Acct:  [id number]       Patient's PCP: Provider, No Known    Admit Date: 1/16/2021     Discharge Date: 2/9/2021      Admitting Physician: Maurice Carpenter MD    Discharge Physician: Maurice Carpenter MD     Final Diagnoses  1. Acute hypoxic/hypercapneic respiratory failure  2. S/p COVID pneumonia  3. Bilateral pneumothoraces - left pneumothorax has resolved   4. ARDS  5. Parkinson's Disease  6. Dementia  7. Bipolar disorder   8. Dysphagia  9. DM type 2 with hyperglycemia  10. MRSA pneumonia    Hospital Problems    Acute tracheostomy management (CMS/[facility])    Ventilator dependence (CMS/[facility])    Acute hypoxemic respiratory failure due to COVID-19 (CMS/[facility])   No past medical history on file.      Code Status:    There are no questions and answers to display.       Hospital Course: The patient had been in his usual state of health when he was found to be positive for COVID-19 at the SNF where he resides on 12/16. His symptoms progressed and he required hospitalization on 12/29. He continued to deteriorate and required intubation with mechanical ventilation on 1/5. He was unable to liberate from the ventilator and required tracheostomy tube and g tube placement on 1/14. He developed bilateral pneumothoraces requiring chest tube placement. The patient was a g[facility]me[facility] of the [facility] with DNR status. He transferred to our facility for continued vent weaning. The patient was unable to tolerate aggressive vent weaning. Due to his deconditioned state and advanced dementia, he was not felt to be a good candidate for aggressive therapies. He was able to have one of the chest tubes discontinued. Due to his poor prognosis and limited progress, his family and state appointed guardian opted to forego further aggressive treatments in favor of pursuing comfort measures/end of life care.  The patient succumbed to his many acute and chronic illnesses on 2/9 at 1604.     Consults:  Dr. Vail (pulmonology)  Dr. Benoit (ENT)  Dr. Martinez (cardiothoracic surgery)      Time Spent on discharge is  28 minutes in patient examination, evaluation, patient/family counseling as well as medication reconciliation, prescriptions for required medications, discharge plan and follow up.     Electronically signed by Maurice Carpenter MD on 2/10/2021 at 13:15 CST

## 2025-04-30 NOTE — PROGRESS NOTES
4 Eyes Skin Assessment    Petros Humphries is being assessed upon: Admission    I agree that I, Nicola Bowman, along with Oanh Byrd (either 2 RN's or 1 LPN and 1 RN) have performed a thorough Head to Toe Skin Assessment on the patient. ALL assessment sites listed below have been assessed. Areas assessed by both nurses:     [x]   Head, Face, and Ears   [x]   Shoulders, Back, and Chest  [x]   Arms, Elbows, and Hands   [x]   Coccyx, Sacrum, and Ischium  [x]   Legs, Feet, and Heels    Does the Patient have Skin Breakdown? Yes, wound(s) noted upon assessment. It is the responsibility of the Primary Nurse to assure that the following documentation, preventions, orders, and consults are complete on the above noted wound(s): Wound LDA initiated. LDA Flowsheet Documentation includes the Dipika-wound, Wound Assessment, Measurements, Dressing Treatment, Drainage, and Color.      Right arm, above elbow: skin tear    Rashad Prevention initiated: Yes  Wound Care Orders initiated: No    WOC nurse consulted for Pressure Injury (Stage 3,4, Unstageable, DTI, NWPT, and Complex wounds) and New or Established Ostomies: No        Primary Nurse eSignature: Nicola Bowman RN on 7/2/2020 at 10:52 PM      Co-Signer eSignature: Electronically signed by Manny Penn RN on 7/3/20 at 4:10 AM CDT Detail Level: Zone Detail Level: Detailed